# Patient Record
Sex: MALE | Race: BLACK OR AFRICAN AMERICAN | NOT HISPANIC OR LATINO | Employment: OTHER | URBAN - METROPOLITAN AREA
[De-identification: names, ages, dates, MRNs, and addresses within clinical notes are randomized per-mention and may not be internally consistent; named-entity substitution may affect disease eponyms.]

---

## 2017-01-30 ENCOUNTER — OFFICE VISIT (OUTPATIENT)
Dept: OPHTHALMOLOGY | Facility: CLINIC | Age: 82
End: 2017-01-30
Payer: MEDICARE

## 2017-01-30 DIAGNOSIS — H40.013 OPEN ANGLE WITH BORDERLINE FINDINGS, LOW RISK, BILATERAL: Primary | ICD-10-CM

## 2017-01-30 PROCEDURE — 99999 PR PBB SHADOW E&M-EST. PATIENT-LVL II: CPT | Mod: PBBFAC,,, | Performed by: OPHTHALMOLOGY

## 2017-01-30 PROCEDURE — 92133 CPTRZD OPH DX IMG PST SGM ON: CPT | Mod: S$GLB,,, | Performed by: OPHTHALMOLOGY

## 2017-01-30 PROCEDURE — 92083 EXTENDED VISUAL FIELD XM: CPT | Mod: S$GLB,,, | Performed by: OPHTHALMOLOGY

## 2017-01-30 PROCEDURE — 92020 GONIOSCOPY: CPT | Mod: S$GLB,,, | Performed by: OPHTHALMOLOGY

## 2017-01-30 PROCEDURE — 92136 OPHTHALMIC BIOMETRY: CPT | Mod: LT,S$GLB,, | Performed by: OPHTHALMOLOGY

## 2017-01-30 PROCEDURE — 92012 INTRM OPH EXAM EST PATIENT: CPT | Mod: 25,S$GLB,, | Performed by: OPHTHALMOLOGY

## 2017-01-30 RX ORDER — KETOROLAC TROMETHAMINE 5 MG/ML
1 SOLUTION OPHTHALMIC 4 TIMES DAILY
Qty: 1 BOTTLE | Refills: 2 | Status: SHIPPED | OUTPATIENT
Start: 2017-01-30 | End: 2017-04-03 | Stop reason: ALTCHOICE

## 2017-01-30 RX ORDER — PREDNISOLONE ACETATE 10 MG/ML
1 SUSPENSION/ DROPS OPHTHALMIC 4 TIMES DAILY
Qty: 1 BOTTLE | Refills: 2 | Status: SHIPPED | OUTPATIENT
Start: 2017-01-30 | End: 2017-04-03 | Stop reason: ALTCHOICE

## 2017-01-30 RX ORDER — GATIFLOXACIN 5 MG/ML
1 SOLUTION/ DROPS OPHTHALMIC 2 TIMES DAILY
Qty: 1 BOTTLE | Refills: 2 | Status: SHIPPED | OUTPATIENT
Start: 2017-01-30 | End: 2017-04-03 | Stop reason: ALTCHOICE

## 2017-01-30 NOTE — PROGRESS NOTES
SUBJECTIVE:   Chinmay Mei is a 83 y.o. male   Uncorrected distance visual acuity was 20/70 -1 in the right eye and 20/80 in the left eye.   Chief Complaint   Patient presents with    Glaucoma Suspect     6 mwk iop ck, goct, and hvf.         HPI:  HPI     Glaucoma Suspect    Additional comments: 6 mwk iop ck, goct, and hvf.            Comments   PCP: Dr. Leach    1. NSC OU  2. COAG Suspect (+FHX-son Chinmay is my pt)  3. RE       Last edited by Margareth Nieves MA on 1/30/2017  9:58 AM. (History)        Assessment /Plan :  1. Open angle with borderline findings, low risk, bilateral No evidence of glaucoma at this time but based on risk factors recommend to continue monitoring.     2. Nuclear cataract, bilateral  Visually Significant Cataract OS > OD:   Patient reports decreased vision consistent with the clinical amount of lenticular opacity,  which reaches the level of visual significance and affects activities of daily living such as  read. Risks, benefits, and alternatives to cataract surgery were  discussed.  IOL options were discussed, including Premium IOL'S and the associated  side effects and additional patient cost associated with them as well as patient's visual  goals. The pt expressed a desire to proceed with surgery with the potential for some  reasonable degree of visual improvement and was consented.  Risks of loss of vision and eye reviewed as well as possibility of need for spectacle correction after surgery even with premium implants. Verbal and written preop  instructions were provided to the pt.            Pt is interested in traditional monofocal IOL aiming for:       Distance OU and understands that  glasses will be generally needed at all times for near vision and often for finer distance correction especially for higher degrees of astigmatism.             Final visual acuity may be limited by astigmatism    Pt wishes to have OS eye done.    Requests a Monofocal IOL.    Will aim for  distance    Other considerations: Glaucoma Suspect

## 2017-02-23 ENCOUNTER — OFFICE VISIT (OUTPATIENT)
Dept: OPHTHALMOLOGY | Facility: CLINIC | Age: 82
End: 2017-02-23
Payer: MEDICARE

## 2017-02-23 DIAGNOSIS — Z98.890 POST-OPERATIVE STATE: Primary | ICD-10-CM

## 2017-02-23 PROCEDURE — 99024 POSTOP FOLLOW-UP VISIT: CPT | Mod: S$GLB,,, | Performed by: OPHTHALMOLOGY

## 2017-02-23 PROCEDURE — 99999 PR PBB SHADOW E&M-EST. PATIENT-LVL II: CPT | Mod: PBBFAC,,, | Performed by: OPHTHALMOLOGY

## 2017-02-23 NOTE — PROGRESS NOTES
SUBJECTIVE:   Chinmay Mei is a 83 y.o. male   Uncorrected distance visual acuity was not recorded in the right eye and 20/50 in the left eye.   Chief Complaint   Patient presents with    Post-op Evaluation     1 day PO PCIOL OS        HPI:  HPI     Post-op Evaluation    Additional comments: 1 day PO PCIOL OS           Comments   1 day PO PCIOL OS  No pain or discomfort  VA improving OS    PCP: Dr. Lecah    1. NSC OD  PCIOL OS +21.0 SN60WF 2-22-17 (Distance)  2. COAG Suspect (+FHX-son Chinmay is my pt)  3. RE    OS Gatifloxacin, Pred, Ketorolac QID       Last edited by Leena Garcia on 2/23/2017  8:12 AM. (History)        Assessment /Plan :  1. Post-operative state Exam:  SLE:  S/C: normal  K : trace k fold  AC: trace cell and flare  Iris: normal  Lens: PCIOL    IMP:  PO Day 1 S/P Phaco/IOL OS : Doing well.    Plan:  Continue gtts to operative eye:  Pred 1% QID  Ketorolac QID  Zymaxid BID  Reinstructed in importance of absolute compliance with Post-OP instructions including medications, shield at bedtime, and limitation of activities. Follow up appointments in approximately one and six weeks or call immediately for increased pain, redness or vision loss.

## 2017-02-23 NOTE — MR AVS SNAPSHOT
[facility]  [address] [place] [address]  Phone: [phone]  Fax: [phone]                  Chinmay Mei   2017 8:00 AM   Office Visit    Description:  Male : 1933   Provider:  Luis E Horne MD   Department:  [facility]           Reason for Visit     Post-op Evaluation           Diagnoses this Visit        Comments    Post-operative state    -  Primary            To Do List           Goals (5 Years of Data)     None      Follow-Up and Disposition     Return in about 1 week (around 3/2/2017).      [facility]     [facility] -  Assistance  Registered nurses in the [facility] Center provide clinical advisement, health education, appointment booking, and other advisory services.  Call for this free service at [phone].             Medications           Message regarding Medications     Verify the changes and/or additions to your medication regime listed below are the same as discussed with your clinician today.  If any of these changes or additions are incorrect, please notify your healthcare provider.             Verify that the below list of medications is an accurate representation of the medications you are currently taking.  If none reported, the list may be blank. If incorrect, please contact your healthcare provider. Carry this list with you in case of emergency.           Current Medications     acetaminophen (TYLENOL) 325 MG tablet Take 325 mg by mouth as needed.    amlodipine (NORVASC) 5 MG tablet Take 1 tablet (5 mg total) by mouth 2 (two) times daily.    gatifloxacin (ZYMAR) 0.5 % Drop drops Apply 1 drop to eye 2 (two) times daily. Eyedrops to start one day before surgery    hydrALAZINE (APRESOLINE) 50 MG tablet Take 1 tablet (50 mg total) by mouth every 12 (twelve) hours.    ketorolac 0.5% (ACULAR) 0.5 % Drop Place 1 drop into the left eye 4 (four) times daily. Eyedrops to start one day before  surgery    losartan (COZAAR) 100 MG tablet Take 1 tablet (100 mg total) by mouth once daily.    prednisoLONE acetate (PRED FORTE) 1 % DrpS Place 1 drop into the left eye 4 (four) times daily. Start one day before surgery           Clinical Reference Information           Allergies as of 2/23/2017     Aspirin      Immunizations Administered on Date of Encounter - 2/23/2017     None      Language Assistance Services     ATTENTION: Language assistance services are available, free of charge. Please call [phone].      ATENCIÓN: Si latiala broderick, tiene a white disposición servicios gratuitos de asistencia lingüística. Llame al [phone].     JOSE Ý: N?u b?n nói Ti?ng Vi?t, có các d?ch v? h? tr? ngôn ng? mi?n phí dành cho b?n. G?i s? [phone].         [facility] complies with applicable Federal civil rights laws and does not discriminate on the basis of race, color, national origin, age, disability, or sex.

## 2017-03-03 ENCOUNTER — OFFICE VISIT (OUTPATIENT)
Dept: OPHTHALMOLOGY | Facility: CLINIC | Age: 82
End: 2017-03-03
Payer: MEDICARE

## 2017-03-03 DIAGNOSIS — Z98.890 POST-OPERATIVE STATE: Primary | ICD-10-CM

## 2017-03-03 PROCEDURE — 99024 POSTOP FOLLOW-UP VISIT: CPT | Mod: S$GLB,,, | Performed by: OPHTHALMOLOGY

## 2017-03-03 PROCEDURE — 99999 PR PBB SHADOW E&M-EST. PATIENT-LVL II: CPT | Mod: PBBFAC,,, | Performed by: OPHTHALMOLOGY

## 2017-03-03 NOTE — MR AVS SNAPSHOT
[facility]  [address] [place] [address]  Phone: [phone]  Fax: [phone]                  Chinmay Mei   3/3/2017 8:00 AM   Office Visit    Description:  Male : 1933   Provider:  Luis E Horne MD   Department:  [facility]           Reason for Visit     Post-op Evaluation           Diagnoses this Visit        Comments    Post-operative state    -  Primary            To Do List           Goals (5 Years of Data)     None      [facility]     [facility] Care [facility] -  Assistance  Registered nurses in the [facility] Center provide clinical advisement, health education, appointment booking, and other advisory services.  Call for this free service at [phone].             Medications           Message regarding Medications     Verify the changes and/or additions to your medication regime listed below are the same as discussed with your clinician today.  If any of these changes or additions are incorrect, please notify your healthcare provider.             Verify that the below list of medications is an accurate representation of the medications you are currently taking.  If none reported, the list may be blank. If incorrect, please contact your healthcare provider. Carry this list with you in case of emergency.           Current Medications     acetaminophen (TYLENOL) 325 MG tablet Take 325 mg by mouth as needed.    amlodipine (NORVASC) 5 MG tablet Take 1 tablet (5 mg total) by mouth 2 (two) times daily.    gatifloxacin (ZYMAR) 0.5 % Drop drops Apply 1 drop to eye 2 (two) times daily. Eyedrops to start one day before surgery    hydrALAZINE (APRESOLINE) 50 MG tablet Take 1 tablet (50 mg total) by mouth every 12 (twelve) hours.    ketorolac 0.5% (ACULAR) 0.5 % Drop Place 1 drop into the left eye 4 (four) times daily. Eyedrops to start one day before surgery    losartan (COZAAR) 100 MG tablet Take 1 tablet (100 mg total) by mouth  once daily.    prednisoLONE acetate (PRED FORTE) 1 % DrpS Place 1 drop into the left eye 4 (four) times daily. Start one day before surgery           Clinical Reference Information           Allergies as of 3/3/2017     Aspirin      Immunizations Administered on Date of Encounter - 3/3/2017     None      Language Assistance Services     ATTENTION: Language assistance services are available, free of charge. Please call [phone].      ATENCIÓN: Si habla español, tiene a white disposición servicios gratuitos de asistencia lingüística. Llame al [phone].     [facility] Ý: N?u b?n nói Ti?ng Vi?t, có các d?ch v? h? tr? ngôn ng? mi?n phí dành cho b?n. G?i s? [phone].         [facility] complies with applicable Federal civil rights laws and does not discriminate on the basis of race, color, national origin, age, disability, or sex.

## 2017-03-03 NOTE — PROGRESS NOTES
SUBJECTIVE:   Chinmay Mei is a 83 y.o. male   Uncorrected distance visual acuity was not recorded in the right eye and 20/50 -1 in the left eye.   Chief Complaint   Patient presents with    Post-op Evaluation     gat bid os, pred qid os, ket qid os. no pain or irritation        HPI:  HPI     Post-op Evaluation    Additional comments: gat bid os, pred qid os, ket qid os. no pain or   irritation           Comments   1 wk s/p phaco os. States eye is doing well. Does not want to schedule   second eye.   1. NSC OD  PCIOL OS +21.0 SN60WF 2-22-17 (Distance)  2. COAG Suspect (+FHX-son Chinmay is my pt)  3. RE    OS Gatifloxacin, Pred, Ketorolac QID       Last edited by Margareth Nieves MA on 3/3/2017  8:04 AM. (History)        Assessment /Plan :  1. Post-operative state   Slit lamp exam:  L/L: nl  K: clear, wound sealed  AC: trace cell and flare  Iris/Lens: IOL centered and stable      IMP:  PO Week 1 S/P Phaco/ IOL OS : Doing well with no evidence of infection or abnormal inflammation.     Plan:  Pt given and instructed in one week PO instructions. D/C zymaxid and start to taper off Ketorlac and Pred Forte 1% weekly. Can resume normal activitites and d/c eye shield. OTC reading glasses can be used until evaluated for final MRAnnie Follow up in one month or PRN pain, redness, vision loss, or other concerns.

## 2017-04-03 ENCOUNTER — OFFICE VISIT (OUTPATIENT)
Dept: OPHTHALMOLOGY | Facility: CLINIC | Age: 82
End: 2017-04-03
Payer: MEDICARE

## 2017-04-03 DIAGNOSIS — Z98.890 POST-OPERATIVE STATE: Primary | ICD-10-CM

## 2017-04-03 PROCEDURE — 99024 POSTOP FOLLOW-UP VISIT: CPT | Mod: S$GLB,,, | Performed by: OPHTHALMOLOGY

## 2017-04-03 PROCEDURE — 99999 PR PBB SHADOW E&M-EST. PATIENT-LVL II: CPT | Mod: PBBFAC,,, | Performed by: OPHTHALMOLOGY

## 2017-04-03 NOTE — PROGRESS NOTES
SUBJECTIVE:   Chinmay Mei is a 83 y.o. male   Uncorrected distance visual acuity was not recorded in the right eye and 20/30 -2 in the left eye.   Chief Complaint   Patient presents with    Post-op Evaluation     5 wk s/p pciol ou. no pain or irritation. not ready to schedule second eye        HPI:  HPI     Post-op Evaluation    Additional comments: 5 wk s/p pciol ou. no pain or irritation. not ready   to schedule second eye           Comments   1. NSC OD  PCIOL OS +21.0 SN60WF 2-22-17 (Distance)  2. COAG Suspect (+FHX-son Chinmay is my pt)  3. RE         Last edited by Margareth Nieves MA on 4/3/2017 11:01 AM. (History)        Assessment /Plan :  1. Post-operative state   Exam:    Slit lamp exam:  L/L: nl  S/C: quiet and uninflamed  K: clear, wound sealed  AC: no cell or flare  Iris/Lens: IOL centered and stable      Assessment:    PO Month 1 S/P Phaco/IOL OS : Doing Well and completing healing process. Final visual correction options discussed and appropriate prescriptions and/or OTC reading glass recommendations offered to patient. Pt desires PAL Rx. Pt instructed to follow up with Dr. Honre in 6 months for next eye exam or PRN any pain, redness, vision changes or other concerns.     2. Nuclear cataract of right eye monitor for now

## 2017-04-03 NOTE — MR AVS SNAPSHOT
[facility]  [address] [place] [address]  Phone: [phone]  Fax: [phone]                  Chinmay Mei   4/3/2017 10:45 AM   Office Visit    Description:  Male : 1933   Provider:  Luis E Horne MD   Department:  [facility]           Reason for Visit     Post-op Evaluation           Diagnoses this Visit        Comments    Post-operative state    -  Primary     Nuclear cataract of right eye                To Do List           Goals (5 Years of Data)     None      [facility]     [facility] On Call Nurse Care Line -  Assistance  Unless otherwise directed by your provider, please contact [facility], our nurse care line that is available for  assistance.     Registered nurses in the [facility] provide: appointment scheduling, clinical advisement, health education, and other advisory services.  Call: [phone] (toll free)               Medications           Message regarding Medications     Verify the changes and/or additions to your medication regime listed below are the same as discussed with your clinician today.  If any of these changes or additions are incorrect, please notify your healthcare provider.        STOP taking these medications     gatifloxacin (ZYMAR) 0.5 % Drop drops Apply 1 drop to eye 2 (two) times daily. Eyedrops to start one day before surgery    ketorolac 0.5% (ACULAR) 0.5 % Drop Place 1 drop into the left eye 4 (four) times daily. Eyedrops to start one day before surgery    prednisoLONE acetate (PRED FORTE) 1 % DrpS Place 1 drop into the left eye 4 (four) times daily. Start one day before surgery           Verify that the below list of medications is an accurate representation of the medications you are currently taking.  If none reported, the list may be blank. If incorrect, please contact your healthcare provider. Carry this list with you in case of emergency.           Current Medications      acetaminophen (TYLENOL) 325 MG tablet Take 325 mg by mouth as needed.    amlodipine (NORVASC) 5 MG tablet Take 1 tablet (5 mg total) by mouth 2 (two) times daily.    hydrALAZINE (APRESOLINE) 50 MG tablet Take 1 tablet (50 mg total) by mouth every 12 (twelve) hours.    losartan (COZAAR) 100 MG tablet Take 1 tablet (100 mg total) by mouth once daily.           Clinical Reference Information           Allergies as of 4/3/2017     Aspirin      Immunizations Administered on Date of Encounter - 4/3/2017     None      Language Assistance Services     ATTENTION: Language assistance services are available, free of charge. Please call [phone].      ATENCIÓN: Si latiala broderick, tiene a white disposición servicios gratuitos de asistencia lingüística. Llame al [phone].     CHÚ Ý: N?u b?n nói Ti?ng Vi?t, có các d?ch v? h? tr? ngôn ng? mi?n phí dành cho b?n. G?i s? [phone].         [facility] complies with applicable Federal civil rights laws and does not discriminate on the basis of race, color, national origin, age, disability, or sex.

## 2017-07-24 RX ORDER — LOSARTAN POTASSIUM 100 MG/1
TABLET ORAL
Qty: 90 TABLET | Refills: 3 | Status: SHIPPED | OUTPATIENT
Start: 2017-07-24 | End: 2018-12-18 | Stop reason: SDUPTHER

## 2017-07-24 RX ORDER — HYDRALAZINE HYDROCHLORIDE 50 MG/1
TABLET, FILM COATED ORAL
Qty: 180 TABLET | Refills: 3 | Status: SHIPPED | OUTPATIENT
Start: 2017-07-24 | End: 2018-12-18 | Stop reason: SDUPTHER

## 2017-07-24 RX ORDER — AMLODIPINE BESYLATE 5 MG/1
TABLET ORAL
Qty: 180 TABLET | Refills: 3 | Status: SHIPPED | OUTPATIENT
Start: 2017-07-24 | End: 2018-12-18 | Stop reason: SDUPTHER

## 2017-07-25 ENCOUNTER — PATIENT MESSAGE (OUTPATIENT)
Dept: FAMILY MEDICINE | Facility: CLINIC | Age: 82
End: 2017-07-25

## 2017-10-04 ENCOUNTER — OFFICE VISIT (OUTPATIENT)
Dept: OPHTHALMOLOGY | Facility: CLINIC | Age: 82
End: 2017-10-04
Payer: MEDICARE

## 2017-10-04 DIAGNOSIS — H02.836 DERMATOCHALASIS OF EYELIDS OF BOTH EYES: ICD-10-CM

## 2017-10-04 DIAGNOSIS — H25.11 NUCLEAR SENILE CATARACT OF RIGHT EYE: Primary | ICD-10-CM

## 2017-10-04 DIAGNOSIS — H02.833 DERMATOCHALASIS OF EYELIDS OF BOTH EYES: ICD-10-CM

## 2017-10-04 DIAGNOSIS — H57.819 BROW PTOSIS: ICD-10-CM

## 2017-10-04 DIAGNOSIS — Z96.1 PSEUDOPHAKIA: ICD-10-CM

## 2017-10-04 DIAGNOSIS — H52.7 REFRACTIVE ERROR: ICD-10-CM

## 2017-10-04 DIAGNOSIS — H40.013 OPEN ANGLE WITH BORDERLINE FINDINGS, LOW RISK, BILATERAL: ICD-10-CM

## 2017-10-04 PROCEDURE — 99999 PR PBB SHADOW E&M-EST. PATIENT-LVL III: CPT | Mod: PBBFAC,,, | Performed by: OPHTHALMOLOGY

## 2017-10-04 PROCEDURE — 92014 COMPRE OPH EXAM EST PT 1/>: CPT | Mod: S$GLB,,, | Performed by: OPHTHALMOLOGY

## 2017-10-04 PROCEDURE — 92136 OPHTHALMIC BIOMETRY: CPT | Mod: RT,S$GLB,, | Performed by: OPHTHALMOLOGY

## 2017-10-04 RX ORDER — GATIFLOXACIN 5 MG/ML
1 SOLUTION/ DROPS OPHTHALMIC 2 TIMES DAILY
Qty: 1 BOTTLE | Refills: 2 | Status: SHIPPED | OUTPATIENT
Start: 2017-10-04 | End: 2018-03-07 | Stop reason: ALTCHOICE

## 2017-10-04 RX ORDER — PREDNISOLONE SODIUM PHOSPHATE 10 MG/ML
1 SOLUTION/ DROPS OPHTHALMIC 4 TIMES DAILY
Qty: 10 ML | Refills: 1 | Status: SHIPPED | OUTPATIENT
Start: 2017-10-04 | End: 2018-03-07 | Stop reason: ALTCHOICE

## 2017-10-04 RX ORDER — KETOROLAC TROMETHAMINE 5 MG/ML
1 SOLUTION OPHTHALMIC 4 TIMES DAILY
Qty: 1 BOTTLE | Refills: 2 | Status: SHIPPED | OUTPATIENT
Start: 2017-10-04 | End: 2018-03-07 | Stop reason: ALTCHOICE

## 2017-10-04 NOTE — PROGRESS NOTES
SUBJECTIVE:   Chinmay Mei is a 83 y.o. male   Uncorrected distance visual acuity was not recorded in the right eye and 20/25 in the left eye. Corrected distance visual acuity was 20/40 in the right eye and not recorded in the left eye.   Chief Complaint   Patient presents with    Eye Exam        HPI:  HPI     Pt here for 6 month check after cataract surgery. No pain or discomfort.   Pt states that he can't read up close, even with glasses. He says that no   matter which eye he opens, he can't read close. Pt says he is still not   ready to schedule his second eye.     PCP: Dr. Leach    1. NSC OD  PCIOL OS +21.0 SN60WF 2-22-17 (Distance)  2. COAG Suspect (+FHX-son Chinmay is my pt)  3. RE    Last edited by Louie Jack, Patient Care Assistant on 10/4/2017  3:31   PM. (History)        Assessment /Plan :  1. Nuclear senile cataract of right eye   Patient reports decreased vision in the fellow eye consistent with the clinical amount of lenticular opacity, which reaches the level of visual significance and affects activities of daily living including reading and glare. Risks, benefits, and alternatives to cataract surgery were discussed and pt desired to schedule cataract surgery. Pt was consented and the biometry and lens options were reviewed.     Schedule Cataract Surgery OD        Final visual acuity may be limited by astigmatism     Requests a Monofocal  IOL.     Will aim for distance     Other considerations: NONE         2. Pseudophakia,left eye stable   3. Open angle with borderline findings, low risk, bilateral No evidence of glaucoma at this time but based on risk factors recommend to continue monitoring.     4. Dermatochalasis of eyelids of both eyes see below   5. Brow ptosis Recommend Evaluation with Dr.Kyle Giron pt will call and schedule when ready explained to pt lift will help with Acuity   6. Refractive error advised pt to hold off on new RX until cat sx OD     RTC 1-3 months cat sx OD

## 2018-02-22 ENCOUNTER — PATIENT OUTREACH (OUTPATIENT)
Dept: ADMINISTRATIVE | Facility: HOSPITAL | Age: 83
End: 2018-02-22

## 2018-03-07 ENCOUNTER — HOSPITAL ENCOUNTER (OUTPATIENT)
Dept: RADIOLOGY | Facility: HOSPITAL | Age: 83
Discharge: HOME | End: 2018-03-07
Attending: INTERNAL MEDICINE
Payer: MEDICARE

## 2018-03-07 ENCOUNTER — OFFICE VISIT (OUTPATIENT)
Dept: FAMILY MEDICINE | Facility: CLINIC | Age: 83
End: 2018-03-07
Payer: MEDICARE

## 2018-03-07 VITALS
WEIGHT: 207.69 LBS | HEART RATE: 67 BPM | DIASTOLIC BLOOD PRESSURE: 82 MMHG | OXYGEN SATURATION: 99 % | RESPIRATION RATE: 16 BRPM | SYSTOLIC BLOOD PRESSURE: 122 MMHG | BODY MASS INDEX: 28.13 KG/M2 | TEMPERATURE: 98 F | HEIGHT: 72 IN

## 2018-03-07 DIAGNOSIS — I10 ESSENTIAL HYPERTENSION: Primary | ICD-10-CM

## 2018-03-07 DIAGNOSIS — E78.00 HYPERCHOLESTEREMIA: ICD-10-CM

## 2018-03-07 DIAGNOSIS — R01.1 HEART MURMUR: ICD-10-CM

## 2018-03-07 DIAGNOSIS — R97.20 ELEVATED PSA: ICD-10-CM

## 2018-03-07 DIAGNOSIS — I10 ESSENTIAL HYPERTENSION: ICD-10-CM

## 2018-03-07 PROCEDURE — 71046 X-RAY EXAM CHEST 2 VIEWS: CPT | Mod: 26,,, | Performed by: RADIOLOGY

## 2018-03-07 PROCEDURE — 3074F SYST BP LT 130 MM HG: CPT | Mod: S$GLB,,, | Performed by: INTERNAL MEDICINE

## 2018-03-07 PROCEDURE — 71046 X-RAY EXAM CHEST 2 VIEWS: CPT | Mod: TC,FY,PO

## 2018-03-07 PROCEDURE — 3079F DIAST BP 80-89 MM HG: CPT | Mod: S$GLB,,, | Performed by: INTERNAL MEDICINE

## 2018-03-07 PROCEDURE — 99214 OFFICE O/P EST MOD 30 MIN: CPT | Mod: S$GLB,,, | Performed by: INTERNAL MEDICINE

## 2018-03-07 PROCEDURE — 99999 PR PBB SHADOW E&M-EST. PATIENT-LVL IV: CPT | Mod: PBBFAC,,, | Performed by: INTERNAL MEDICINE

## 2018-03-07 NOTE — PROGRESS NOTES
Subjective:       Patient ID: Chinmay Mei is a 84 y.o. male.    Chief Complaint: Annual Exam; Hypertension; Hyperlipidemia; and Elevated PSA    Hypertension   This is a chronic problem. The problem is controlled. Pertinent negatives include no chest pain, headaches, neck pain, palpitations or shortness of breath.   Hyperlipidemia   Pertinent negatives include no chest pain, myalgias or shortness of breath. Current antihyperlipidemic treatment includes diet change and exercise.     Review of Systems   Constitutional: Negative for activity change, appetite change, chills, diaphoresis, fatigue, fever and unexpected weight change.   HENT: Negative for drooling, ear discharge, ear pain, facial swelling, hearing loss, mouth sores, nosebleeds, postnasal drip, rhinorrhea, sinus pressure, sneezing, sore throat, tinnitus, trouble swallowing and voice change.    Eyes: Negative for photophobia, redness and visual disturbance.   Respiratory: Negative for apnea, cough, choking, chest tightness, shortness of breath and wheezing.    Cardiovascular: Negative for chest pain, palpitations and leg swelling.   Gastrointestinal: Negative for abdominal distention, abdominal pain, anal bleeding, blood in stool, constipation, diarrhea, nausea and vomiting.   Endocrine: Negative for cold intolerance, heat intolerance, polydipsia, polyphagia and polyuria.   Genitourinary: Negative for difficulty urinating, dysuria, enuresis, flank pain, frequency, genital sores, hematuria and urgency.   Musculoskeletal: Negative for arthralgias, back pain, gait problem, joint swelling, myalgias, neck pain and neck stiffness.   Skin: Negative for color change, pallor, rash and wound.   Allergic/Immunologic: Negative for food allergies and immunocompromised state.   Neurological: Negative for dizziness, tremors, seizures, syncope, facial asymmetry, speech difficulty, weakness, light-headedness, numbness and headaches.   Hematological: Negative for  adenopathy. Does not bruise/bleed easily.   Psychiatric/Behavioral: Negative for agitation, behavioral problems, confusion, decreased concentration, dysphoric mood, hallucinations, self-injury, sleep disturbance and suicidal ideas. The patient is not nervous/anxious and is not hyperactive.        Objective:      Physical Exam   Constitutional: He is oriented to person, place, and time. He appears well-developed and well-nourished. No distress.   HENT:   Head: Normocephalic and atraumatic.   Mouth/Throat: Oropharyngeal exudate present.   Eyes: Pupils are equal, round, and reactive to light. No scleral icterus.   Neck: Normal range of motion. Neck supple. No JVD present. Carotid bruit is not present. No tracheal deviation present. No thyromegaly present.   Cardiovascular: Normal rate, regular rhythm, normal heart sounds and intact distal pulses.    Pulmonary/Chest: Effort normal and breath sounds normal. No respiratory distress. He has no wheezes. He has no rales. He exhibits no tenderness.   Abdominal: Soft. Bowel sounds are normal. He exhibits no distension. There is no tenderness. There is no rebound and no guarding.   Musculoskeletal: Normal range of motion. He exhibits no edema or tenderness.   Lymphadenopathy:     He has no cervical adenopathy.   Neurological: He is alert and oriented to person, place, and time.   Skin: Skin is warm and dry. No rash noted. He is not diaphoretic. No erythema. No pallor.   Psychiatric: He has a normal mood and affect. His behavior is normal. Judgment and thought content normal.   Nursing note and vitals reviewed.      Assessment:       1. Essential hypertension    2. Hypercholesteremia    3. Heart murmur    4. Elevated PSA        Plan:        stable-----------------------continue meds.               Notes/labs reviewed.        Check cbc,cmp,lipids,tsh,psa. ,cxr.                       F/u 6 months.          Declines vaccines-

## 2018-03-08 ENCOUNTER — TELEPHONE (OUTPATIENT)
Dept: FAMILY MEDICINE | Facility: CLINIC | Age: 83
End: 2018-03-08

## 2018-03-08 DIAGNOSIS — R97.20 PSA ELEVATION: Primary | ICD-10-CM

## 2018-03-08 NOTE — TELEPHONE ENCOUNTER
Spoke with Cally she states his psa level is always elevated and patient does not want to see urology for it.

## 2018-06-15 ENCOUNTER — TELEPHONE (OUTPATIENT)
Dept: FAMILY MEDICINE | Facility: CLINIC | Age: 83
End: 2018-06-15

## 2018-06-15 NOTE — TELEPHONE ENCOUNTER
----- Message from Latasha Hernandez sent at 6/15/2018 10:13 AM CDT -----  Contact: emma Alamo. other   Calling to verify appt for NP wellness appt for 6/19.  Can you tell her more about it?

## 2018-08-03 ENCOUNTER — OFFICE VISIT (OUTPATIENT)
Dept: OPHTHALMOLOGY | Facility: CLINIC | Age: 83
End: 2018-08-03
Payer: MEDICARE

## 2018-08-03 DIAGNOSIS — H25.11 NUCLEAR SENILE CATARACT OF RIGHT EYE: Primary | ICD-10-CM

## 2018-08-03 DIAGNOSIS — Z96.1 PSEUDOPHAKIA: ICD-10-CM

## 2018-08-03 DIAGNOSIS — H40.013 OPEN ANGLE WITH BORDERLINE FINDINGS, LOW RISK, BILATERAL: ICD-10-CM

## 2018-08-03 PROCEDURE — 92136 OPHTHALMIC BIOMETRY: CPT | Mod: RT,S$GLB,, | Performed by: OPHTHALMOLOGY

## 2018-08-03 PROCEDURE — 99999 PR PBB SHADOW E&M-EST. PATIENT-LVL II: CPT | Mod: PBBFAC,,, | Performed by: OPHTHALMOLOGY

## 2018-08-03 PROCEDURE — 92014 COMPRE OPH EXAM EST PT 1/>: CPT | Mod: S$GLB,,, | Performed by: OPHTHALMOLOGY

## 2018-08-03 RX ORDER — PREDNISOLONE ACETATE 10 MG/ML
1 SUSPENSION/ DROPS OPHTHALMIC 4 TIMES DAILY
Qty: 1 BOTTLE | Refills: 2 | Status: SHIPPED | OUTPATIENT
Start: 2018-08-03 | End: 2018-10-01

## 2018-08-03 RX ORDER — GATIFLOXACIN 5 MG/ML
1 SOLUTION/ DROPS OPHTHALMIC 2 TIMES DAILY
Qty: 1 BOTTLE | Refills: 2 | Status: SHIPPED | OUTPATIENT
Start: 2018-08-03 | End: 2018-10-01

## 2018-08-03 RX ORDER — KETOROLAC TROMETHAMINE 5 MG/ML
1 SOLUTION OPHTHALMIC 4 TIMES DAILY
Qty: 1 BOTTLE | Refills: 2 | Status: SHIPPED | OUTPATIENT
Start: 2018-08-03 | End: 2018-10-01

## 2018-08-03 NOTE — PROGRESS NOTES
SUBJECTIVE:   Chinmay Mei is a 84 y.o. male   Corrected distance visual acuity was 20/25 -2 in the right eye and 20/25 in the left eye.   Chief Complaint   Patient presents with    Blurred Vision        HPI:  HPI     Pt states that every now and then, his left eye feels like he has some   sand in it. He says that his right eye is ok. No problems. No pain or   discomfort.     PCP: Dr. Leach    1. NSC OD  PCIOL OS +21.0 SN60WF 2-22-17 (Distance)  2. COAG Suspect (+FHX-son Chinmay is my pt)  3. RE    Last edited by Louie Jack, Patient Care Assistant on 8/3/2018 11:00   AM. (History)        Assessment /Plan :  1. Nuclear senile cataract of right eye    2. Pseudophakia    3. Open angle with borderline findings, low risk, bilateral

## 2018-08-03 NOTE — PROGRESS NOTES
SUBJECTIVE:   Chinmay Mei is a 84 y.o. male   Corrected distance visual acuity was 20/25 -2 in the right eye and 20/25 in the left eye.   Chief Complaint   Patient presents with    Blurred Vision        HPI:  HPI     Pt states that every now and then, his left eye feels like he has some   sand in it. He says that his right eye is ok. No problems. No pain or   discomfort.     PCP: Dr. Leach    1. NSC OD  PCIOL OS +21.0 SN60WF 2-22-17 (Distance)  2. COAG Suspect (+FHX-son Chinmay is my pt)  3. RE    Last edited by Louie Jack, Patient Care Assistant on 8/3/2018 11:00   AM. (History)        Assessment /Plan :  1. Nuclear senile cataract of right eye  Visually Significant Cataract OD:   Patient reports decreased vision consistent with the clinical amount of lenticular opacity,  which reaches the level of visual significance and affects activities of daily living such as  read, drive, watch TV. Risks, benefits, and alternatives to cataract surgery were  discussed.  IOL options were discussed, including Premium IOL'S and the associated  side effects and additional patient cost associated with them as well as patient's visual  goals. The pt expressed a desire to proceed with surgery with the potential for some  reasonable degree of visual improvement and was consented.  Risks of loss of vision and eye reviewed as well as possibility of need for spectacle correction after surgery even with premium implants. Verbal and written preop  instructions were provided to the pt.            Pt is interested in traditional monofocal IOL aiming for:       Distance OU and understands that  glasses will be generally needed at all times for near vision and often for finer distance correction especially for higher degrees of astigmatism.          Near OU and understands glasses will be required for distance vision and possibly for different near or intermediate ranges.       Monovision and understands that depth perception and  higher quality vision will generally be poorer unless corrective glassses are worn.         Pt is interested  In TORIC IOL aiming for:  The Additonal cost of $1350 per eye will be the patient's responsibilty.     Distance OU and understands that  glasses will be generally needed at all times for near vision and possibly for some finer distance correction      Near OU and understands glasses will be required for distance vision and possibly for different near or intermediate ranges.     Monovision and understands that depth perception and higher quality vision will generally be poorer unless corrective glassses are worn.          Pt is interested in Mulitfocal IOL's and understands that approximately 80% patients generally do not require glasses after surgery but some pts still wear glasses for near, intermediate and/or distance tasks especially if they have higher levels of astigmatism. Some pt also see haloes around lights and have more difficulty reading in reduced lighting.The Additonal cost of $1750 per eye will be the patient's responsibilty and second eye will also require a multifocal lens implant.      Final visual acuity may be limited by astigmatism and cornea disease    Pt wishes to have Phaco/IOL  OD     Requests a Monofocal IOL.    Will aim for distance    Other considerations: CORNEAL PRECAUTIONS       2. Pseudophakia  -- Condition stable, no therapeutic change required. Monitoring routinely.     3. Open angle with borderline findings, low risk, bilateral No evidence of glaucoma at this time but based on risk factors recommend to continue monitoring.

## 2018-08-10 ENCOUNTER — TELEPHONE (OUTPATIENT)
Dept: OPHTHALMOLOGY | Facility: CLINIC | Age: 83
End: 2018-08-10

## 2018-08-14 ENCOUNTER — TELEPHONE (OUTPATIENT)
Dept: OPHTHALMOLOGY | Facility: CLINIC | Age: 83
End: 2018-08-14

## 2018-08-14 NOTE — TELEPHONE ENCOUNTER
Spoke to spouse (Summer) and reminded her of the following:surgery is Wednesday 8/15 with arrival time of 0700 @ [facility];post op is 8/16 with arrival time of 0745 @ [facility];start 3 pre-op drops today;use 1 drop of each med before leaving home for surgery;nothing by mouth after midnight except heart, RLS, chronic pain, anxiety, of blood pressure meds before leaving home for surgery

## 2018-08-16 ENCOUNTER — OFFICE VISIT (OUTPATIENT)
Dept: OPHTHALMOLOGY | Facility: CLINIC | Age: 83
End: 2018-08-16
Payer: MEDICARE

## 2018-08-16 DIAGNOSIS — Z98.890 POST-OPERATIVE STATE: Primary | ICD-10-CM

## 2018-08-16 PROCEDURE — 99024 POSTOP FOLLOW-UP VISIT: CPT | Mod: S$GLB,,, | Performed by: OPHTHALMOLOGY

## 2018-08-16 PROCEDURE — 99999 PR PBB SHADOW E&M-EST. PATIENT-LVL II: CPT | Mod: PBBFAC,,, | Performed by: OPHTHALMOLOGY

## 2018-08-16 NOTE — PROGRESS NOTES
SUBJECTIVE:   Chinmay Mei is a 84 y.o. male   Uncorrected distance visual acuity was 20/60 in the right eye and not recorded in the left eye.   Chief Complaint   Patient presents with    Post-op Evaluation     1 day phaco od doing well         HPI:  HPI     Post-op Evaluation      Additional comments: 1 day phaco od doing well           Last edited by Lu Love MA on 8/16/2018  8:04 AM. (History)        Assessment /Plan :  1. Post-operative state Exam:  SLE:  S/C: normal  K : trace k fold  AC: trace cell and flare  Iris: normal  Lens: PCIOL    IMP:  PO Day 1 S/P Phaco/IOL OD : Doing well.    Plan:  Continue gtts to operative eye:  Pred 1% QID  Ketorolac QID  Zymaxid BID  Reinstructed in importance of absolute compliance with Post-OP instructions including medications, shield at bedtime, and limitation of activities. Follow up appointments in approximately one and six weeks or call immediately for increased pain, redness or vision loss.

## 2018-08-23 ENCOUNTER — OFFICE VISIT (OUTPATIENT)
Dept: OPHTHALMOLOGY | Facility: CLINIC | Age: 83
End: 2018-08-23
Payer: MEDICARE

## 2018-08-23 DIAGNOSIS — Z98.890 POST-OPERATIVE STATE: Primary | ICD-10-CM

## 2018-08-23 PROCEDURE — 99999 PR PBB SHADOW E&M-EST. PATIENT-LVL II: CPT | Mod: PBBFAC,,, | Performed by: OPHTHALMOLOGY

## 2018-08-23 PROCEDURE — 99024 POSTOP FOLLOW-UP VISIT: CPT | Mod: S$GLB,,, | Performed by: OPHTHALMOLOGY

## 2018-08-23 NOTE — PROGRESS NOTES
SUBJECTIVE:   Chinmay Mei is a 84 y.o. male   Uncorrected distance visual acuity was 20/80 in the right eye and not recorded in the left eye.   Chief Complaint   Patient presents with    Post-op Evaluation     1 wk PO PCIOL OD        HPI:  HPI     Post-op Evaluation      Additional comments: 1 wk PO PCIOL OD              Comments     1wk PO PCIOL OD  No pain or discomfort  [facility] improving OD    PCP: Dr. Leach    1.PC IOL OD +21.5 SN60WF (distance) 8/16/18  PCIOL OS +21.0 SN60WF 2-22-17 (Distance)  2. COAG Suspect (+FHX-son Chinmay is my pt)  3. RE    Pred Ket qid gat BID OD          Last edited by Leena Garcia on 8/23/2018  1:44 PM. (History)        Assessment /Plan :  1. Post-operative state Slit lamp exam:  L/L: nl  K: clear, wound sealed  AC: trace cell and flare  Iris/Lens: IOL centered and stable      IMP:  PO Week 1 S/P Phaco/ IOL OD : Doing well with no evidence of infection or abnormal inflammation.     Plan:  Pt given and instructed in one week PO instructions. D/C zymaxid and start to taper off Ketorlac and Pred Forte 1% weekly. Can resume normal activitites and d/c eye shield. OTC reading glasses can be used until evaluated for final MR. Follow up in one month or PRN pain, redness, vision loss, or other concerns.

## 2018-09-10 ENCOUNTER — LAB VISIT (OUTPATIENT)
Dept: LAB | Facility: HOSPITAL | Age: 83
End: 2018-09-10
Attending: INTERNAL MEDICINE
Payer: MEDICARE

## 2018-09-10 ENCOUNTER — OFFICE VISIT (OUTPATIENT)
Dept: FAMILY MEDICINE | Facility: CLINIC | Age: 83
End: 2018-09-10
Payer: MEDICARE

## 2018-09-10 VITALS
BODY MASS INDEX: 28.72 KG/M2 | DIASTOLIC BLOOD PRESSURE: 80 MMHG | HEIGHT: 72 IN | HEART RATE: 65 BPM | WEIGHT: 212.06 LBS | OXYGEN SATURATION: 98 % | SYSTOLIC BLOOD PRESSURE: 138 MMHG | TEMPERATURE: 98 F

## 2018-09-10 DIAGNOSIS — I10 ESSENTIAL HYPERTENSION: ICD-10-CM

## 2018-09-10 DIAGNOSIS — R01.1 HEART MURMUR: ICD-10-CM

## 2018-09-10 DIAGNOSIS — R97.20 ELEVATED PSA: ICD-10-CM

## 2018-09-10 DIAGNOSIS — E78.00 HYPERCHOLESTEREMIA: ICD-10-CM

## 2018-09-10 DIAGNOSIS — I35.8 AORTIC VALVE SCLEROSIS: ICD-10-CM

## 2018-09-10 DIAGNOSIS — R97.20 ELEVATED PSA: Primary | ICD-10-CM

## 2018-09-10 PROCEDURE — 99214 OFFICE O/P EST MOD 30 MIN: CPT | Mod: PBBFAC,PO | Performed by: INTERNAL MEDICINE

## 2018-09-10 PROCEDURE — 3075F SYST BP GE 130 - 139MM HG: CPT | Mod: CPTII,,, | Performed by: INTERNAL MEDICINE

## 2018-09-10 PROCEDURE — 3079F DIAST BP 80-89 MM HG: CPT | Mod: CPTII,,, | Performed by: INTERNAL MEDICINE

## 2018-09-10 PROCEDURE — 99999 PR PBB SHADOW E&M-EST. PATIENT-LVL IV: CPT | Mod: PBBFAC,,, | Performed by: INTERNAL MEDICINE

## 2018-09-10 PROCEDURE — 36415 COLL VENOUS BLD VENIPUNCTURE: CPT | Mod: PO

## 2018-09-10 PROCEDURE — 1101F PT FALLS ASSESS-DOCD LE1/YR: CPT | Mod: CPTII,,, | Performed by: INTERNAL MEDICINE

## 2018-09-10 PROCEDURE — 84153 ASSAY OF PSA TOTAL: CPT

## 2018-09-10 PROCEDURE — 99214 OFFICE O/P EST MOD 30 MIN: CPT | Mod: S$PBB,,, | Performed by: INTERNAL MEDICINE

## 2018-09-10 NOTE — PROGRESS NOTES
Subjective:       Patient ID: Chinmay Mei is a 84 y.o. male.    Chief Complaint: Hypertension; Hyperlipidemia; and Elevated PSA    Hypertension   This is a chronic problem. The problem is controlled. Pertinent negatives include no chest pain, headaches, neck pain, palpitations or shortness of breath.   Hyperlipidemia   Pertinent negatives include no chest pain, myalgias or shortness of breath. Current antihyperlipidemic treatment includes diet change and exercise.     Past Medical History:   Diagnosis Date    Arthritis     Hyperlipidemia     Hypertension     Open angle with borderline findings, low risk, bilateral 8/3/2018     Past Surgical History:   Procedure Laterality Date    CATARACT EXTRACTION W/  INTRAOCULAR LENS IMPLANT Left 2017    CATARACT EXTRACTION W/  INTRAOCULAR LENS IMPLANT Right 08/15/2018     Family History   Problem Relation Age of Onset    Heart disease Mother     Hypertension Mother     Prostate cancer Father     Diabetes Father     Diabetes Sister     Heart disease Sister     Glaucoma Sister     Hypertension Sister     Ulcers Sister     Heart disease Brother     Diabetes Brother     Hypertension Brother     Kidney disease Daughter         dialysis    Stomach cancer Daughter     Diabetes Son     Hypertension Son     Stroke Brother      Social History     Socioeconomic History    Marital status:      Spouse name: Not on file    Number of children: Not on file    Years of education: Not on file    Highest education level: Not on file   Social Needs    Financial resource strain: Not on file    Food insecurity - worry: Not on file    Food insecurity - inability: Not on file    Transportation needs - medical: Not on file    Transportation needs - non-medical: Not on file   Occupational History    Not on file   Tobacco Use    Smoking status: Former Smoker     Last attempt to quit: 2006     Years since quittin.1    Smokeless tobacco: Never  Used   Substance and Sexual Activity    Alcohol use: No    Drug use: No    Sexual activity: Not on file   Other Topics Concern    Not on file   Social History Narrative    Not on file     Review of Systems   Constitutional: Negative for activity change, appetite change, chills, diaphoresis, fatigue, fever and unexpected weight change.   HENT: Positive for rhinorrhea. Negative for drooling, ear discharge, ear pain, facial swelling, hearing loss, mouth sores, nosebleeds, postnasal drip, sinus pressure, sneezing, sore throat, tinnitus, trouble swallowing and voice change.    Eyes: Negative for photophobia, discharge, redness and visual disturbance.   Respiratory: Negative for apnea, cough, choking, chest tightness, shortness of breath and wheezing.    Cardiovascular: Negative for chest pain, palpitations and leg swelling.   Gastrointestinal: Positive for constipation. Negative for abdominal distention, abdominal pain, anal bleeding, blood in stool, diarrhea, nausea and vomiting.   Endocrine: Negative for cold intolerance, heat intolerance, polydipsia, polyphagia and polyuria.   Genitourinary: Negative for difficulty urinating, dysuria, enuresis, flank pain, frequency, genital sores, hematuria and urgency.   Musculoskeletal: Positive for arthralgias. Negative for back pain, gait problem, joint swelling, myalgias, neck pain and neck stiffness.   Skin: Negative for color change, pallor, rash and wound.   Allergic/Immunologic: Negative for food allergies and immunocompromised state.   Neurological: Negative for dizziness, tremors, seizures, syncope, facial asymmetry, speech difficulty, weakness, light-headedness, numbness and headaches.   Hematological: Negative for adenopathy. Does not bruise/bleed easily.   Psychiatric/Behavioral: Negative for agitation, behavioral problems, confusion, decreased concentration, dysphoric mood, hallucinations, self-injury, sleep disturbance and suicidal ideas. The patient is not  nervous/anxious and is not hyperactive.        Objective:      Physical Exam   Constitutional: He is oriented to person, place, and time. He appears well-developed and well-nourished. No distress.   HENT:   Head: Normocephalic and atraumatic.   Eyes: Pupils are equal, round, and reactive to light. No scleral icterus.   Neck: Normal range of motion. Neck supple. No JVD present. Carotid bruit is not present. No tracheal deviation present. No thyromegaly present.   Cardiovascular: Normal rate, regular rhythm, normal heart sounds and intact distal pulses.   Pulmonary/Chest: Effort normal and breath sounds normal. No respiratory distress. He has no wheezes. He has no rales. He exhibits no tenderness.   Abdominal: Soft. Bowel sounds are normal. He exhibits no distension. There is no tenderness. There is no rebound and no guarding.   Musculoskeletal: Normal range of motion. He exhibits no edema or tenderness.   Lymphadenopathy:     He has no cervical adenopathy.   Neurological: He is alert and oriented to person, place, and time.   Skin: Skin is warm and dry. No rash noted. He is not diaphoretic. No erythema. No pallor.   Psychiatric: He has a normal mood and affect. His behavior is normal. Judgment and thought content normal.   Nursing note and vitals reviewed.      CMP  Sodium   Date Value Ref Range Status   03/07/2018 139 136 - 145 mmol/L Final     Potassium   Date Value Ref Range Status   03/07/2018 4.3 3.5 - 5.1 mmol/L Final     Chloride   Date Value Ref Range Status   03/07/2018 105 95 - 110 mmol/L Final     CO2   Date Value Ref Range Status   03/07/2018 27 23 - 29 mmol/L Final     Glucose   Date Value Ref Range Status   03/07/2018 102 70 - 110 mg/dL Final     BUN, Bld   Date Value Ref Range Status   03/07/2018 15 8 - 23 mg/dL Final     Creatinine   Date Value Ref Range Status   03/07/2018 1.0 0.5 - 1.4 mg/dL Final     Calcium   Date Value Ref Range Status   03/07/2018 10.0 8.7 - 10.5 mg/dL Final     Total Protein    Date Value Ref Range Status   03/07/2018 8.1 6.0 - 8.4 g/dL Final     Albumin   Date Value Ref Range Status   03/07/2018 4.2 3.5 - 5.2 g/dL Final     Total Bilirubin   Date Value Ref Range Status   03/07/2018 1.1 (H) 0.1 - 1.0 mg/dL Final     Comment:     For infants and newborns, interpretation of results should be based  on gestational age, weight and in agreement with clinical  observations.  Premature Infant recommended reference ranges:  Up to 24 hours.............<8.0 mg/dL  Up to 48 hours............<12.0 mg/dL  3-5 days..................<15.0 mg/dL  6-29 days.................<15.0 mg/dL       Alkaline Phosphatase   Date Value Ref Range Status   03/07/2018 96 55 - 135 U/L Final     AST   Date Value Ref Range Status   03/07/2018 22 10 - 40 U/L Final     ALT   Date Value Ref Range Status   03/07/2018 13 10 - 44 U/L Final     Anion Gap   Date Value Ref Range Status   03/07/2018 7 (L) 8 - 16 mmol/L Final     eGFR if    Date Value Ref Range Status   03/07/2018 >60.0 >60 mL/min/1.73 m^2 Final     eGFR if non    Date Value Ref Range Status   03/07/2018 >60.0 >60 mL/min/1.73 m^2 Final     Comment:     Calculation used to obtain the estimated glomerular filtration  rate (eGFR) is the CKD-EPI equation.        Lab Results   Component Value Date    WBC 6.21 03/07/2018    HGB 13.6 (L) 03/07/2018    HCT 42.9 03/07/2018    MCV 87 03/07/2018     03/07/2018     Lab Results   Component Value Date    CHOL 223 (H) 03/07/2018     Lab Results   Component Value Date    HDL 37 (L) 03/07/2018     Lab Results   Component Value Date    LDLCALC 157.0 03/07/2018     Lab Results   Component Value Date    TRIG 145 03/07/2018     Lab Results   Component Value Date    CHOLHDL 16.6 (L) 03/07/2018     Lab Results   Component Value Date    TSH 1.032 03/07/2018     No results found for: LABA1C, HGBA1C  Assessment:       1. Elevated PSA    2. Heart murmur    3. Essential hypertension    4.  Hypercholesteremia    5. Aortic valve sclerosis        Plan:   Elevated PSA----declines urology appt.  -     Prostate Specific Antigen, Diagnostic; Future; Expected date: 09/10/2018    Heart murmur    Essential hypertension--stable.    Hypercholesteremia-watch diet.,    Aortic valve sclerosis       F/u 6 months.

## 2018-09-11 ENCOUNTER — TELEPHONE (OUTPATIENT)
Dept: FAMILY MEDICINE | Facility: CLINIC | Age: 83
End: 2018-09-11

## 2018-09-11 LAB — COMPLEXED PSA SERPL-MCNC: 8 NG/ML

## 2018-10-01 ENCOUNTER — OFFICE VISIT (OUTPATIENT)
Dept: OPHTHALMOLOGY | Facility: CLINIC | Age: 83
End: 2018-10-01
Payer: MEDICARE

## 2018-10-01 DIAGNOSIS — Z98.890 POST-OPERATIVE STATE: Primary | ICD-10-CM

## 2018-10-01 PROCEDURE — 99211 OFF/OP EST MAY X REQ PHY/QHP: CPT | Mod: PBBFAC,PO | Performed by: OPTOMETRIST

## 2018-10-01 PROCEDURE — 99024 POSTOP FOLLOW-UP VISIT: CPT | Mod: ,,, | Performed by: OPTOMETRIST

## 2018-10-01 PROCEDURE — 99999 PR PBB SHADOW E&M-EST. PATIENT-LVL I: CPT | Mod: PBBFAC,,, | Performed by: OPTOMETRIST

## 2018-10-01 NOTE — PROGRESS NOTES
HPI     Post-op Evaluation      Additional comments: 5 wk s/p phaco OD. states he is doing well.               Comments     1.PC IOL OD +21.5 SN60WF (distance) 8/16/18  PCIOL OS +21.0 SN60WF 2-22-17 (Distance)  2. COAG Suspect (+FHX-son Chinmay is my pt)  3. RE          Last edited by Margareth March MA on 10/1/2018 10:08 AM. (History)            Assessment /Plan     For exam results, see Encounter Report.    Post-operative state      Doing well OU  New spec rx given, ok to c/w OTC readers +1.25 best for near  Eyeglass Final Rx     Eyeglass Final Rx       Sphere Cylinder Axis Add    Right -1.50 +2.75 180 +2.25    Left -1.75 +1.75 162 +2.25    Expiration Date:  10/2/2019                  RTC 6 months for dilated eye exam with CPG, PRN sooner if any problems or concerns

## 2018-12-19 RX ORDER — LOSARTAN POTASSIUM 100 MG/1
TABLET ORAL
Qty: 90 TABLET | Refills: 3 | Status: SHIPPED | OUTPATIENT
Start: 2018-12-19 | End: 2019-09-25

## 2018-12-19 RX ORDER — HYDRALAZINE HYDROCHLORIDE 50 MG/1
TABLET, FILM COATED ORAL
Qty: 180 TABLET | Refills: 3 | Status: SHIPPED | OUTPATIENT
Start: 2018-12-19 | End: 2020-03-24 | Stop reason: SDUPTHER

## 2018-12-19 RX ORDER — AMLODIPINE BESYLATE 5 MG/1
TABLET ORAL
Qty: 180 TABLET | Refills: 3 | Status: SHIPPED | OUTPATIENT
Start: 2018-12-19 | End: 2020-03-24 | Stop reason: SDUPTHER

## 2019-01-07 ENCOUNTER — OFFICE VISIT (OUTPATIENT)
Dept: OPHTHALMOLOGY | Facility: CLINIC | Age: 84
End: 2019-01-07
Payer: MEDICARE

## 2019-01-07 DIAGNOSIS — H10.13 ALLERGIC CONJUNCTIVITIS OF BOTH EYES: Primary | ICD-10-CM

## 2019-01-07 PROCEDURE — 92012 INTRM OPH EXAM EST PATIENT: CPT | Mod: S$GLB,,, | Performed by: OPHTHALMOLOGY

## 2019-01-07 PROCEDURE — 99999 PR PBB SHADOW E&M-EST. PATIENT-LVL II: ICD-10-PCS | Mod: PBBFAC,,, | Performed by: OPHTHALMOLOGY

## 2019-01-07 PROCEDURE — 92012 PR EYE EXAM, EST PATIENT,INTERMED: ICD-10-PCS | Mod: S$GLB,,, | Performed by: OPHTHALMOLOGY

## 2019-01-07 PROCEDURE — 99999 PR PBB SHADOW E&M-EST. PATIENT-LVL II: CPT | Mod: PBBFAC,,, | Performed by: OPHTHALMOLOGY

## 2019-01-07 NOTE — PROGRESS NOTES
SUBJECTIVE:   Chinmay Mei is a 85 y.o. male   Uncorrected distance visual acuity was 20/100 in the right eye and 20/60 in the left eye.   Chief Complaint   Patient presents with    Eye Problem     pt c/o scratchiness, itching, and matting in OS. started last few weeks        HPI:  HPI     Eye Problem      Additional comments: pt c/o scratchiness, itching, and matting in OS.   started last few weeks              Comments     pt c/o scratchiness, itching, and matting in OS. started last few weeks.   Today was the worst.     1.PC IOL OD +21.5 SN60WF (distance) 8/16/18  PCIOL OS +21.0 SN60WF 2-22-17 (Distance)  2. COAG Suspect (+FHX-son Chinmay is my pt)  3. RE          Last edited by Margareth March MA on 1/7/2019  3:19 PM. (History)        Assessment /Plan :  1. Allergic conjunctivitis of both eyes Begin Zaditor BID OU..Begin Cold Compresses when the eye's become itchy along with drops            RTC as scheduled

## 2019-03-25 ENCOUNTER — LAB VISIT (OUTPATIENT)
Dept: LAB | Facility: HOSPITAL | Age: 84
End: 2019-03-25
Payer: MEDICARE

## 2019-03-25 ENCOUNTER — OFFICE VISIT (OUTPATIENT)
Dept: FAMILY MEDICINE | Facility: CLINIC | Age: 84
End: 2019-03-25
Payer: MEDICARE

## 2019-03-25 VITALS
WEIGHT: 214.94 LBS | HEIGHT: 72 IN | TEMPERATURE: 99 F | BODY MASS INDEX: 29.11 KG/M2 | DIASTOLIC BLOOD PRESSURE: 89 MMHG | SYSTOLIC BLOOD PRESSURE: 136 MMHG | RESPIRATION RATE: 18 BRPM | HEART RATE: 89 BPM | OXYGEN SATURATION: 96 %

## 2019-03-25 DIAGNOSIS — E78.00 HYPERCHOLESTEREMIA: ICD-10-CM

## 2019-03-25 DIAGNOSIS — R97.20 ELEVATED PSA: ICD-10-CM

## 2019-03-25 DIAGNOSIS — R41.3 MEMORY LOSS: ICD-10-CM

## 2019-03-25 DIAGNOSIS — I10 ESSENTIAL HYPERTENSION: Primary | ICD-10-CM

## 2019-03-25 DIAGNOSIS — I10 ESSENTIAL HYPERTENSION: ICD-10-CM

## 2019-03-25 DIAGNOSIS — I35.8 AORTIC VALVE SCLEROSIS: ICD-10-CM

## 2019-03-25 PROCEDURE — 1101F PR PT FALLS ASSESS DOC 0-1 FALLS W/OUT INJ PAST YR: ICD-10-PCS | Mod: CPTII,S$GLB,, | Performed by: INTERNAL MEDICINE

## 2019-03-25 PROCEDURE — 3079F PR MOST RECENT DIASTOLIC BLOOD PRESSURE 80-89 MM HG: ICD-10-PCS | Mod: CPTII,S$GLB,, | Performed by: INTERNAL MEDICINE

## 2019-03-25 PROCEDURE — 84153 ASSAY OF PSA TOTAL: CPT

## 2019-03-25 PROCEDURE — 36415 COLL VENOUS BLD VENIPUNCTURE: CPT | Mod: PO

## 2019-03-25 PROCEDURE — 80061 LIPID PANEL: CPT

## 2019-03-25 PROCEDURE — 99214 OFFICE O/P EST MOD 30 MIN: CPT | Mod: S$GLB,,, | Performed by: INTERNAL MEDICINE

## 2019-03-25 PROCEDURE — 99999 PR PBB SHADOW E&M-EST. PATIENT-LVL IV: CPT | Mod: PBBFAC,,, | Performed by: INTERNAL MEDICINE

## 2019-03-25 PROCEDURE — 3079F DIAST BP 80-89 MM HG: CPT | Mod: CPTII,S$GLB,, | Performed by: INTERNAL MEDICINE

## 2019-03-25 PROCEDURE — 3075F SYST BP GE 130 - 139MM HG: CPT | Mod: CPTII,S$GLB,, | Performed by: INTERNAL MEDICINE

## 2019-03-25 PROCEDURE — 99214 PR OFFICE/OUTPT VISIT, EST, LEVL IV, 30-39 MIN: ICD-10-PCS | Mod: S$GLB,,, | Performed by: INTERNAL MEDICINE

## 2019-03-25 PROCEDURE — 1101F PT FALLS ASSESS-DOCD LE1/YR: CPT | Mod: CPTII,S$GLB,, | Performed by: INTERNAL MEDICINE

## 2019-03-25 PROCEDURE — 82607 VITAMIN B-12: CPT

## 2019-03-25 PROCEDURE — 99999 PR PBB SHADOW E&M-EST. PATIENT-LVL IV: ICD-10-PCS | Mod: PBBFAC,,, | Performed by: INTERNAL MEDICINE

## 2019-03-25 PROCEDURE — 3075F PR MOST RECENT SYSTOLIC BLOOD PRESS GE 130-139MM HG: ICD-10-PCS | Mod: CPTII,S$GLB,, | Performed by: INTERNAL MEDICINE

## 2019-03-25 PROCEDURE — 80053 COMPREHEN METABOLIC PANEL: CPT

## 2019-03-25 PROCEDURE — 85025 COMPLETE CBC W/AUTO DIFF WBC: CPT

## 2019-03-25 NOTE — PROGRESS NOTES
Subjective:       Patient ID: Chinmay Mei is a 85 y.o. male.    Chief Complaint: Follow-up; Hypertension; Hyperlipidemia; and Elevated PSA    Hypertension   Pertinent negatives include no chest pain, headaches, neck pain, palpitations or shortness of breath.   Hyperlipidemia   Pertinent negatives include no chest pain, myalgias or shortness of breath.     Past Medical History:   Diagnosis Date    Arthritis     Hyperlipidemia     Hypertension     Open angle with borderline findings, low risk, bilateral 8/3/2018     Past Surgical History:   Procedure Laterality Date    CATARACT EXTRACTION W/  INTRAOCULAR LENS IMPLANT Left 2017    CATARACT EXTRACTION W/  INTRAOCULAR LENS IMPLANT Right 08/15/2018     Family History   Problem Relation Age of Onset    Heart disease Mother     Hypertension Mother     Prostate cancer Father     Diabetes Father     Diabetes Sister     Heart disease Sister     Glaucoma Sister     Hypertension Sister     Ulcers Sister     Heart disease Brother     Diabetes Brother     Hypertension Brother     Kidney disease Daughter         dialysis    Stomach cancer Daughter     Diabetes Son     Hypertension Son     Stroke Brother      Social History     Socioeconomic History    Marital status:      Spouse name: Not on file    Number of children: Not on file    Years of education: Not on file    Highest education level: Not on file   Occupational History    Not on file   Social Needs    Financial resource strain: Not on file    Food insecurity:     Worry: Not on file     Inability: Not on file    Transportation needs:     Medical: Not on file     Non-medical: Not on file   Tobacco Use    Smoking status: Former Smoker     Last attempt to quit: 2006     Years since quittin.6    Smokeless tobacco: Never Used   Substance and Sexual Activity    Alcohol use: No    Drug use: No    Sexual activity: Not on file   Lifestyle    Physical activity:     Days  per week: Not on file     Minutes per session: Not on file    Stress: Not on file   Relationships    Social connections:     Talks on phone: Not on file     Gets together: Not on file     Attends Gnosticism service: Not on file     Active member of club or organization: Not on file     Attends meetings of clubs or organizations: Not on file     Relationship status: Not on file    Intimate partner violence:     Fear of current or ex partner: Not on file     Emotionally abused: Not on file     Physically abused: Not on file     Forced sexual activity: Not on file   Other Topics Concern    Not on file   Social History Narrative    Not on file     Review of Systems   Constitutional: Negative for activity change, appetite change, chills, diaphoresis, fatigue, fever and unexpected weight change.   HENT: Negative for drooling, ear discharge, ear pain, facial swelling, hearing loss, mouth sores, nosebleeds, postnasal drip, rhinorrhea, sinus pressure, sneezing, sore throat, tinnitus and voice change.    Eyes: Negative for photophobia, discharge, redness and visual disturbance.   Respiratory: Negative for apnea, cough, choking, chest tightness, shortness of breath and wheezing.    Cardiovascular: Negative for chest pain, palpitations and leg swelling.   Gastrointestinal: Negative for abdominal distention, abdominal pain, anal bleeding, blood in stool, constipation, diarrhea, nausea and vomiting.   Endocrine: Negative for cold intolerance, heat intolerance, polydipsia, polyphagia and polyuria.   Genitourinary: Negative for difficulty urinating, dysuria, enuresis, flank pain, frequency, genital sores, hematuria and urgency.   Musculoskeletal: Negative for arthralgias, back pain, gait problem, joint swelling, myalgias, neck pain and neck stiffness.   Skin: Negative for color change, pallor, rash and wound.   Allergic/Immunologic: Negative for food allergies and immunocompromised state.   Neurological: Negative for dizziness,  tremors, seizures, syncope, facial asymmetry, speech difficulty, weakness, light-headedness, numbness and headaches.        Memory loss   Hematological: Negative for adenopathy. Does not bruise/bleed easily.   Psychiatric/Behavioral: Negative for agitation, behavioral problems, confusion, decreased concentration, dysphoric mood, hallucinations, self-injury, sleep disturbance and suicidal ideas. The patient is not nervous/anxious and is not hyperactive.        Objective:      Physical Exam   Constitutional: He is oriented to person, place, and time. He appears well-developed and well-nourished. No distress.   HENT:   Head: Normocephalic and atraumatic.   Mouth/Throat: No oropharyngeal exudate.   Eyes: Pupils are equal, round, and reactive to light.   Neck: Normal range of motion. Neck supple. No JVD present. Carotid bruit is not present. No tracheal deviation present. No thyromegaly present.   Cardiovascular: Normal rate, regular rhythm, normal heart sounds and intact distal pulses.   Pulmonary/Chest: Effort normal and breath sounds normal. No respiratory distress. He has no wheezes. He has no rales. He exhibits no tenderness.   Abdominal: Soft. Bowel sounds are normal. He exhibits no distension. There is no tenderness. There is no rebound and no guarding.   Musculoskeletal: Normal range of motion. He exhibits no edema or tenderness.   Lymphadenopathy:     He has no cervical adenopathy.   Neurological: He is alert and oriented to person, place, and time. No cranial nerve deficit.   Skin: Skin is warm and dry. No rash noted. He is not diaphoretic. No erythema. No pallor.   Psychiatric: He has a normal mood and affect. His behavior is normal. Judgment and thought content normal.   Nursing note and vitals reviewed.      CMP  Sodium   Date Value Ref Range Status   03/07/2018 139 136 - 145 mmol/L Final     Potassium   Date Value Ref Range Status   03/07/2018 4.3 3.5 - 5.1 mmol/L Final     Chloride   Date Value Ref Range  Status   03/07/2018 105 95 - 110 mmol/L Final     CO2   Date Value Ref Range Status   03/07/2018 27 23 - 29 mmol/L Final     Glucose   Date Value Ref Range Status   03/07/2018 102 70 - 110 mg/dL Final     BUN, Bld   Date Value Ref Range Status   03/07/2018 15 8 - 23 mg/dL Final     Creatinine   Date Value Ref Range Status   03/07/2018 1.0 0.5 - 1.4 mg/dL Final     Calcium   Date Value Ref Range Status   03/07/2018 10.0 8.7 - 10.5 mg/dL Final     Total Protein   Date Value Ref Range Status   03/07/2018 8.1 6.0 - 8.4 g/dL Final     Albumin   Date Value Ref Range Status   03/07/2018 4.2 3.5 - 5.2 g/dL Final     Total Bilirubin   Date Value Ref Range Status   03/07/2018 1.1 (H) 0.1 - 1.0 mg/dL Final     Comment:     For infants and newborns, interpretation of results should be based  on gestational age, weight and in agreement with clinical  observations.  Premature Infant recommended reference ranges:  Up to 24 hours.............<8.0 mg/dL  Up to 48 hours............<12.0 mg/dL  3-5 days..................<15.0 mg/dL  6-29 days.................<15.0 mg/dL       Alkaline Phosphatase   Date Value Ref Range Status   03/07/2018 96 55 - 135 U/L Final     AST   Date Value Ref Range Status   03/07/2018 22 10 - 40 U/L Final     ALT   Date Value Ref Range Status   03/07/2018 13 10 - 44 U/L Final     Anion Gap   Date Value Ref Range Status   03/07/2018 7 (L) 8 - 16 mmol/L Final     eGFR if    Date Value Ref Range Status   03/07/2018 >60.0 >60 mL/min/1.73 m^2 Final     eGFR if non    Date Value Ref Range Status   03/07/2018 >60.0 >60 mL/min/1.73 m^2 Final     Comment:     Calculation used to obtain the estimated glomerular filtration  rate (eGFR) is the CKD-EPI equation.        Lab Results   Component Value Date    WBC 6.21 03/07/2018    HGB 13.6 (L) 03/07/2018    HCT 42.9 03/07/2018    MCV 87 03/07/2018     03/07/2018     Lab Results   Component Value Date    CHOL 223 (H) 03/07/2018     Lab  Results   Component Value Date    HDL 37 (L) 03/07/2018     Lab Results   Component Value Date    LDLCALC 157.0 03/07/2018     Lab Results   Component Value Date    TRIG 145 03/07/2018     Lab Results   Component Value Date    CHOLHDL 16.6 (L) 03/07/2018     Lab Results   Component Value Date    TSH 1.032 03/07/2018     No results found for: LABA1C, HGBA1C  Assessment:       1. Essential hypertension    2. Hypercholesteremia    3. Elevated PSA    4. Aortic valve sclerosis    5. Memory loss        Plan:   Essential hypertension-stable.  -     Comprehensive metabolic panel; Future; Expected date: 03/25/2019  -     CBC auto differential; Future; Expected date: 03/25/2019    Hypercholesteremia---------  -     Lipid panel; Future; Expected date: 03/25/2019    Elevated PSA  -     Prostate Specific Antigen, Diagnostic; Future; Expected date: 03/25/2019    Aortic valve sclerosis    Memory loss  -     Vitamin B12; Future; Expected date: 03/25/2019  -     Ambulatory referral to Neurology    F/u 6 months.

## 2019-03-26 ENCOUNTER — TELEPHONE (OUTPATIENT)
Dept: FAMILY MEDICINE | Facility: CLINIC | Age: 84
End: 2019-03-26

## 2019-03-26 DIAGNOSIS — R97.20 PSA ELEVATION: Primary | ICD-10-CM

## 2019-03-26 LAB
ALBUMIN SERPL BCP-MCNC: 3.9 G/DL (ref 3.5–5.2)
ALP SERPL-CCNC: 88 U/L (ref 55–135)
ALT SERPL W/O P-5'-P-CCNC: 14 U/L (ref 10–44)
ANION GAP SERPL CALC-SCNC: 11 MMOL/L (ref 8–16)
AST SERPL-CCNC: 18 U/L (ref 10–40)
BASOPHILS # BLD AUTO: 0.04 K/UL (ref 0–0.2)
BASOPHILS NFR BLD: 0.6 % (ref 0–1.9)
BILIRUB SERPL-MCNC: 0.7 MG/DL (ref 0.1–1)
BUN SERPL-MCNC: 22 MG/DL (ref 8–23)
CALCIUM SERPL-MCNC: 9.7 MG/DL (ref 8.7–10.5)
CHLORIDE SERPL-SCNC: 103 MMOL/L (ref 95–110)
CHOLEST SERPL-MCNC: 198 MG/DL (ref 120–199)
CHOLEST/HDLC SERPL: 6.2 {RATIO} (ref 2–5)
CO2 SERPL-SCNC: 26 MMOL/L (ref 23–29)
COMPLEXED PSA SERPL-MCNC: 9.9 NG/ML (ref 0–4)
CREAT SERPL-MCNC: 1 MG/DL (ref 0.5–1.4)
DIFFERENTIAL METHOD: ABNORMAL
EOSINOPHIL # BLD AUTO: 0.1 K/UL (ref 0–0.5)
EOSINOPHIL NFR BLD: 2 % (ref 0–8)
ERYTHROCYTE [DISTWIDTH] IN BLOOD BY AUTOMATED COUNT: 15.6 % (ref 11.5–14.5)
EST. GFR  (AFRICAN AMERICAN): >60 ML/MIN/1.73 M^2
EST. GFR  (NON AFRICAN AMERICAN): >60 ML/MIN/1.73 M^2
GLUCOSE SERPL-MCNC: 80 MG/DL (ref 70–110)
HCT VFR BLD AUTO: 42.4 % (ref 40–54)
HDLC SERPL-MCNC: 32 MG/DL (ref 40–75)
HDLC SERPL: 16.2 % (ref 20–50)
HGB BLD-MCNC: 13.2 G/DL (ref 14–18)
IMM GRANULOCYTES # BLD AUTO: 0.01 K/UL (ref 0–0.04)
IMM GRANULOCYTES NFR BLD AUTO: 0.2 % (ref 0–0.5)
LDLC SERPL CALC-MCNC: 128 MG/DL (ref 63–159)
LYMPHOCYTES # BLD AUTO: 2.9 K/UL (ref 1–4.8)
LYMPHOCYTES NFR BLD: 44.4 % (ref 18–48)
MCH RBC QN AUTO: 27 PG (ref 27–31)
MCHC RBC AUTO-ENTMCNC: 31.1 G/DL (ref 32–36)
MCV RBC AUTO: 87 FL (ref 82–98)
MONOCYTES # BLD AUTO: 0.6 K/UL (ref 0.3–1)
MONOCYTES NFR BLD: 9 % (ref 4–15)
NEUTROPHILS # BLD AUTO: 2.9 K/UL (ref 1.8–7.7)
NEUTROPHILS NFR BLD: 43.8 % (ref 38–73)
NONHDLC SERPL-MCNC: 166 MG/DL
NRBC BLD-RTO: 0 /100 WBC
PLATELET # BLD AUTO: 302 K/UL (ref 150–350)
PMV BLD AUTO: 11.1 FL (ref 9.2–12.9)
POTASSIUM SERPL-SCNC: 4.1 MMOL/L (ref 3.5–5.1)
PROT SERPL-MCNC: 7.4 G/DL (ref 6–8.4)
RBC # BLD AUTO: 4.88 M/UL (ref 4.6–6.2)
SODIUM SERPL-SCNC: 140 MMOL/L (ref 136–145)
TRIGL SERPL-MCNC: 190 MG/DL (ref 30–150)
VIT B12 SERPL-MCNC: 351 PG/ML (ref 210–950)
WBC # BLD AUTO: 6.56 K/UL (ref 3.9–12.7)

## 2019-04-08 ENCOUNTER — OFFICE VISIT (OUTPATIENT)
Dept: OPHTHALMOLOGY | Facility: CLINIC | Age: 84
End: 2019-04-08
Payer: MEDICARE

## 2019-04-08 DIAGNOSIS — H40.013 OPEN ANGLE WITH BORDERLINE FINDINGS, LOW RISK, BILATERAL: ICD-10-CM

## 2019-04-08 DIAGNOSIS — Z96.1 PSEUDOPHAKIA: Primary | ICD-10-CM

## 2019-04-08 PROCEDURE — 92014 COMPRE OPH EXAM EST PT 1/>: CPT | Mod: HCNC,S$GLB,, | Performed by: OPHTHALMOLOGY

## 2019-04-08 PROCEDURE — 99999 PR PBB SHADOW E&M-EST. PATIENT-LVL II: CPT | Mod: PBBFAC,HCNC,, | Performed by: OPHTHALMOLOGY

## 2019-04-08 PROCEDURE — 92014 PR EYE EXAM, EST PATIENT,COMPREHESV: ICD-10-PCS | Mod: HCNC,S$GLB,, | Performed by: OPHTHALMOLOGY

## 2019-04-08 PROCEDURE — 99999 PR PBB SHADOW E&M-EST. PATIENT-LVL II: ICD-10-PCS | Mod: PBBFAC,HCNC,, | Performed by: OPHTHALMOLOGY

## 2019-04-08 NOTE — PROGRESS NOTES
SUBJECTIVE:   Chinmay Mei is a 85 y.o. male   Corrected distance visual acuity was 20/20 in the right eye and 20/25 in the left eye.   Chief Complaint   Patient presents with    Follow-up     6m pseudo dilated exam.        HPI:  HPI     Follow-up      Additional comments: 6m pseudo dilated exam.              Comments     Pt here for 6m dilated exam. No pain or discomfort. VA stable. No gtts.     PCP: Dr. Leach    1.PC IOL OD +21.5 SN60WF (distance) 8/16/18  PCIOL OS +21.0 SN60WF 2-22-17 (Distance)  2. COAG Suspect (+FHX-son Chinmay is my pt)  3. RE          Last edited by Louie Jack, Patient Care Assistant on 4/8/2019 11:32   AM. (History)        Assessment /Plan :  1. Pseudophakia  -- Condition stable, no therapeutic change required. Monitoring routinely.     2. Open angle with borderline findings, low risk, bilateral No evidence of glaucoma at this time but based on risk factors recommend to continue monitoring.       Return to clinic in 6 months  or as needed.  With IOP Check and GOCT

## 2019-09-25 ENCOUNTER — LAB VISIT (OUTPATIENT)
Dept: LAB | Facility: HOSPITAL | Age: 84
End: 2019-09-25
Payer: MEDICARE

## 2019-09-25 ENCOUNTER — OFFICE VISIT (OUTPATIENT)
Dept: FAMILY MEDICINE | Facility: CLINIC | Age: 84
End: 2019-09-25
Payer: MEDICARE

## 2019-09-25 VITALS
DIASTOLIC BLOOD PRESSURE: 86 MMHG | HEART RATE: 75 BPM | WEIGHT: 208.75 LBS | BODY MASS INDEX: 28.32 KG/M2 | OXYGEN SATURATION: 97 % | SYSTOLIC BLOOD PRESSURE: 138 MMHG | TEMPERATURE: 98 F

## 2019-09-25 DIAGNOSIS — I35.8 AORTIC VALVE SCLEROSIS: ICD-10-CM

## 2019-09-25 DIAGNOSIS — I10 ESSENTIAL HYPERTENSION: ICD-10-CM

## 2019-09-25 DIAGNOSIS — I10 ESSENTIAL HYPERTENSION: Primary | ICD-10-CM

## 2019-09-25 DIAGNOSIS — E78.00 HYPERCHOLESTEREMIA: ICD-10-CM

## 2019-09-25 LAB
ALBUMIN SERPL BCP-MCNC: 4.4 G/DL (ref 3.5–5.2)
ALP SERPL-CCNC: 81 U/L (ref 55–135)
ALT SERPL W/O P-5'-P-CCNC: 11 U/L (ref 10–44)
ANION GAP SERPL CALC-SCNC: 9 MMOL/L (ref 8–16)
AST SERPL-CCNC: 14 U/L (ref 10–40)
BASOPHILS # BLD AUTO: 0.05 K/UL (ref 0–0.2)
BASOPHILS NFR BLD: 0.8 % (ref 0–1.9)
BILIRUB SERPL-MCNC: 1.3 MG/DL (ref 0.1–1)
BUN SERPL-MCNC: 16 MG/DL (ref 8–23)
CALCIUM SERPL-MCNC: 9.6 MG/DL (ref 8.7–10.5)
CHLORIDE SERPL-SCNC: 104 MMOL/L (ref 95–110)
CO2 SERPL-SCNC: 29 MMOL/L (ref 23–29)
CREAT SERPL-MCNC: 1.1 MG/DL (ref 0.5–1.4)
DIFFERENTIAL METHOD: ABNORMAL
EOSINOPHIL # BLD AUTO: 0.1 K/UL (ref 0–0.5)
EOSINOPHIL NFR BLD: 2 % (ref 0–8)
ERYTHROCYTE [DISTWIDTH] IN BLOOD BY AUTOMATED COUNT: 15.6 % (ref 11.5–14.5)
EST. GFR  (AFRICAN AMERICAN): >60 ML/MIN/1.73 M^2
EST. GFR  (NON AFRICAN AMERICAN): >60 ML/MIN/1.73 M^2
GLUCOSE SERPL-MCNC: 89 MG/DL (ref 70–110)
HCT VFR BLD AUTO: 45.6 % (ref 40–54)
HGB BLD-MCNC: 14.4 G/DL (ref 14–18)
IMM GRANULOCYTES # BLD AUTO: 0.01 K/UL (ref 0–0.04)
IMM GRANULOCYTES NFR BLD AUTO: 0.2 % (ref 0–0.5)
LYMPHOCYTES # BLD AUTO: 3.6 K/UL (ref 1–4.8)
LYMPHOCYTES NFR BLD: 56.7 % (ref 18–48)
MCH RBC QN AUTO: 27.5 PG (ref 27–31)
MCHC RBC AUTO-ENTMCNC: 31.6 G/DL (ref 32–36)
MCV RBC AUTO: 87 FL (ref 82–98)
MONOCYTES # BLD AUTO: 0.5 K/UL (ref 0.3–1)
MONOCYTES NFR BLD: 8.4 % (ref 4–15)
NEUTROPHILS # BLD AUTO: 2 K/UL (ref 1.8–7.7)
NEUTROPHILS NFR BLD: 31.9 % (ref 38–73)
NRBC BLD-RTO: 0 /100 WBC
PLATELET # BLD AUTO: 288 K/UL (ref 150–350)
PMV BLD AUTO: 11.7 FL (ref 9.2–12.9)
POTASSIUM SERPL-SCNC: 4 MMOL/L (ref 3.5–5.1)
PROT SERPL-MCNC: 8.1 G/DL (ref 6–8.4)
RBC # BLD AUTO: 5.24 M/UL (ref 4.6–6.2)
SODIUM SERPL-SCNC: 142 MMOL/L (ref 136–145)
WBC # BLD AUTO: 6.4 K/UL (ref 3.9–12.7)

## 2019-09-25 PROCEDURE — 3075F SYST BP GE 130 - 139MM HG: CPT | Mod: HCNC,CPTII,S$GLB, | Performed by: INTERNAL MEDICINE

## 2019-09-25 PROCEDURE — 99999 PR PBB SHADOW E&M-EST. PATIENT-LVL III: ICD-10-PCS | Mod: PBBFAC,HCNC,, | Performed by: INTERNAL MEDICINE

## 2019-09-25 PROCEDURE — 36415 COLL VENOUS BLD VENIPUNCTURE: CPT | Mod: HCNC,PO

## 2019-09-25 PROCEDURE — 3075F PR MOST RECENT SYSTOLIC BLOOD PRESS GE 130-139MM HG: ICD-10-PCS | Mod: HCNC,CPTII,S$GLB, | Performed by: INTERNAL MEDICINE

## 2019-09-25 PROCEDURE — 80053 COMPREHEN METABOLIC PANEL: CPT | Mod: HCNC

## 2019-09-25 PROCEDURE — 99214 PR OFFICE/OUTPT VISIT, EST, LEVL IV, 30-39 MIN: ICD-10-PCS | Mod: HCNC,S$GLB,, | Performed by: INTERNAL MEDICINE

## 2019-09-25 PROCEDURE — 1101F PR PT FALLS ASSESS DOC 0-1 FALLS W/OUT INJ PAST YR: ICD-10-PCS | Mod: HCNC,CPTII,S$GLB, | Performed by: INTERNAL MEDICINE

## 2019-09-25 PROCEDURE — 1101F PT FALLS ASSESS-DOCD LE1/YR: CPT | Mod: HCNC,CPTII,S$GLB, | Performed by: INTERNAL MEDICINE

## 2019-09-25 PROCEDURE — 99214 OFFICE O/P EST MOD 30 MIN: CPT | Mod: HCNC,S$GLB,, | Performed by: INTERNAL MEDICINE

## 2019-09-25 PROCEDURE — 85025 COMPLETE CBC W/AUTO DIFF WBC: CPT | Mod: HCNC

## 2019-09-25 PROCEDURE — 3079F PR MOST RECENT DIASTOLIC BLOOD PRESSURE 80-89 MM HG: ICD-10-PCS | Mod: HCNC,CPTII,S$GLB, | Performed by: INTERNAL MEDICINE

## 2019-09-25 PROCEDURE — 99999 PR PBB SHADOW E&M-EST. PATIENT-LVL III: CPT | Mod: PBBFAC,HCNC,, | Performed by: INTERNAL MEDICINE

## 2019-09-25 PROCEDURE — 3079F DIAST BP 80-89 MM HG: CPT | Mod: HCNC,CPTII,S$GLB, | Performed by: INTERNAL MEDICINE

## 2019-09-25 RX ORDER — LOSARTAN POTASSIUM 100 MG/1
100 TABLET ORAL DAILY
Qty: 90 TABLET | Refills: 3 | Status: SHIPPED | OUTPATIENT
Start: 2019-09-25 | End: 2020-03-24 | Stop reason: SDUPTHER

## 2019-09-25 NOTE — PROGRESS NOTES
Subjective:       Patient ID: Chinmay Mei is a 85 y.o. male.    Chief Complaint: Follow-up; Hypertension; and Hyperlipidemia    Follow-up   Associated symptoms include arthralgias and myalgias. Pertinent negatives include no abdominal pain, chest pain, chills, coughing, diaphoresis, fatigue, fever, headaches, joint swelling, nausea, neck pain, numbness, rash, sore throat, vomiting or weakness.   Hypertension   Pertinent negatives include no chest pain, headaches, neck pain, palpitations or shortness of breath.   Hyperlipidemia   Associated symptoms include myalgias. Pertinent negatives include no chest pain or shortness of breath.     Past Medical History:   Diagnosis Date    Arthritis     Hyperlipidemia     Hypertension     Open angle with borderline findings, low risk, bilateral 8/3/2018     Past Surgical History:   Procedure Laterality Date    CATARACT EXTRACTION W/  INTRAOCULAR LENS IMPLANT Left 02/22/2017    CATARACT EXTRACTION W/  INTRAOCULAR LENS IMPLANT Right 08/15/2018     Family History   Problem Relation Age of Onset    Heart disease Mother     Hypertension Mother     Prostate cancer Father     Diabetes Father     Diabetes Sister     Heart disease Sister     Glaucoma Sister     Hypertension Sister     Ulcers Sister     Heart disease Brother     Diabetes Brother     Hypertension Brother     Kidney disease Daughter         dialysis    Stomach cancer Daughter     Diabetes Son     Hypertension Son     Stroke Brother      Social History     Socioeconomic History    Marital status:      Spouse name: Not on file    Number of children: Not on file    Years of education: Not on file    Highest education level: Not on file   Occupational History    Not on file   Social Needs    Financial resource strain: Not on file    Food insecurity:     Worry: Not on file     Inability: Not on file    Transportation needs:     Medical: Not on file     Non-medical: Not on file   Tobacco  Use    Smoking status: Former Smoker     Last attempt to quit: 2006     Years since quittin.1    Smokeless tobacco: Never Used   Substance and Sexual Activity    Alcohol use: No    Drug use: No    Sexual activity: Not on file   Lifestyle    Physical activity:     Days per week: Not on file     Minutes per session: Not on file    Stress: Not on file   Relationships    Social connections:     Talks on phone: Not on file     Gets together: Not on file     Attends Moravian service: Not on file     Active member of club or organization: Not on file     Attends meetings of clubs or organizations: Not on file     Relationship status: Not on file   Other Topics Concern    Not on file   Social History Narrative    Not on file     Review of Systems   Constitutional: Negative for activity change, appetite change, chills, diaphoresis, fatigue, fever and unexpected weight change.   HENT: Negative for drooling, ear discharge, ear pain, facial swelling, hearing loss, mouth sores, nosebleeds, postnasal drip, rhinorrhea, sinus pressure, sneezing, sore throat, tinnitus, trouble swallowing and voice change.    Eyes: Negative for photophobia, redness and visual disturbance.   Respiratory: Negative for apnea, cough, choking, chest tightness, shortness of breath and wheezing.    Cardiovascular: Negative for chest pain, palpitations and leg swelling.   Gastrointestinal: Negative for abdominal distention, abdominal pain, anal bleeding, blood in stool, constipation, diarrhea, nausea and vomiting.   Endocrine: Negative for cold intolerance, heat intolerance, polydipsia, polyphagia and polyuria.   Genitourinary: Negative for difficulty urinating, dysuria, enuresis, flank pain, frequency, genital sores, hematuria and urgency.   Musculoskeletal: Positive for arthralgias and myalgias. Negative for back pain, gait problem, joint swelling, neck pain and neck stiffness.   Skin: Negative for color change, pallor, rash and wound.    Allergic/Immunologic: Negative for food allergies and immunocompromised state.   Neurological: Negative for dizziness, tremors, seizures, syncope, facial asymmetry, speech difficulty, weakness, light-headedness, numbness and headaches.   Hematological: Negative for adenopathy. Does not bruise/bleed easily.   Psychiatric/Behavioral: Negative for agitation, behavioral problems, confusion, decreased concentration, dysphoric mood, hallucinations, self-injury, sleep disturbance and suicidal ideas. The patient is not nervous/anxious and is not hyperactive.        Objective:      Physical Exam   Constitutional: He is oriented to person, place, and time. He appears well-developed and well-nourished. No distress.   HENT:   Head: Normocephalic and atraumatic.   Eyes: Pupils are equal, round, and reactive to light.   Neck: Normal range of motion. Neck supple. No JVD present. Carotid bruit is not present. No tracheal deviation present. No thyromegaly present.   Cardiovascular: Normal rate, regular rhythm and intact distal pulses.   Murmur heard.  Pulmonary/Chest: Effort normal and breath sounds normal. No respiratory distress. He has no wheezes. He has no rales. He exhibits no tenderness.   Abdominal: Soft. Bowel sounds are normal. He exhibits no distension. There is no tenderness. There is no rebound and no guarding.   Musculoskeletal: Normal range of motion. He exhibits no edema or tenderness.   Lymphadenopathy:     He has no cervical adenopathy.   Neurological: He is alert and oriented to person, place, and time.   Skin: Skin is warm and dry. No rash noted. He is not diaphoretic. No erythema. No pallor.   Psychiatric: He has a normal mood and affect. His behavior is normal. Judgment and thought content normal.   Nursing note and vitals reviewed.      CMP  Sodium   Date Value Ref Range Status   03/25/2019 140 136 - 145 mmol/L Final     Potassium   Date Value Ref Range Status   03/25/2019 4.1 3.5 - 5.1 mmol/L Final      Chloride   Date Value Ref Range Status   03/25/2019 103 95 - 110 mmol/L Final     CO2   Date Value Ref Range Status   03/25/2019 26 23 - 29 mmol/L Final     Glucose   Date Value Ref Range Status   03/25/2019 80 70 - 110 mg/dL Final     BUN, Bld   Date Value Ref Range Status   03/25/2019 22 8 - 23 mg/dL Final     Creatinine   Date Value Ref Range Status   03/25/2019 1.0 0.5 - 1.4 mg/dL Final     Calcium   Date Value Ref Range Status   03/25/2019 9.7 8.7 - 10.5 mg/dL Final     Total Protein   Date Value Ref Range Status   03/25/2019 7.4 6.0 - 8.4 g/dL Final     Albumin   Date Value Ref Range Status   03/25/2019 3.9 3.5 - 5.2 g/dL Final     Total Bilirubin   Date Value Ref Range Status   03/25/2019 0.7 0.1 - 1.0 mg/dL Final     Comment:     For infants and newborns, interpretation of results should be based  on gestational age, weight and in agreement with clinical  observations.  Premature Infant recommended reference ranges:  Up to 24 hours.............<8.0 mg/dL  Up to 48 hours............<12.0 mg/dL  3-5 days..................<15.0 mg/dL  6-29 days.................<15.0 mg/dL       Alkaline Phosphatase   Date Value Ref Range Status   03/25/2019 88 55 - 135 U/L Final     AST   Date Value Ref Range Status   03/25/2019 18 10 - 40 U/L Final     ALT   Date Value Ref Range Status   03/25/2019 14 10 - 44 U/L Final     Anion Gap   Date Value Ref Range Status   03/25/2019 11 8 - 16 mmol/L Final     eGFR if    Date Value Ref Range Status   03/25/2019 >60.0 >60 mL/min/1.73 m^2 Final     eGFR if non    Date Value Ref Range Status   03/25/2019 >60.0 >60 mL/min/1.73 m^2 Final     Comment:     Calculation used to obtain the estimated glomerular filtration  rate (eGFR) is the CKD-EPI equation.        Lab Results   Component Value Date    WBC 6.56 03/25/2019    HGB 13.2 (L) 03/25/2019    HCT 42.4 03/25/2019    MCV 87 03/25/2019     03/25/2019     Lab Results   Component Value Date    CHOL  198 03/25/2019     Lab Results   Component Value Date    HDL 32 (L) 03/25/2019     Lab Results   Component Value Date    LDLCALC 128.0 03/25/2019     Lab Results   Component Value Date    TRIG 190 (H) 03/25/2019     Lab Results   Component Value Date    CHOLHDL 16.2 (L) 03/25/2019     Lab Results   Component Value Date    TSH 1.032 03/07/2018     No results found for: LABA1C, HGBA1C  Assessment:       1. Essential hypertension    2. Hypercholesteremia    3. Aortic valve sclerosis        Plan:   Essential hypertension  -     Comprehensive metabolic panel; Future; Expected date: 09/25/2019  -     CBC auto differential; Future; Expected date: 09/25/2019    Hypercholesteremia    Aortic valve sclerosis    Other orders  -     losartan (COZAAR) 100 MG tablet; Take 1 tablet (100 mg total) by mouth once daily.  Dispense: 90 tablet; Refill: 3    Stable--------------------continue current meds.                   F/u 6 months.

## 2019-09-26 ENCOUNTER — TELEPHONE (OUTPATIENT)
Dept: FAMILY MEDICINE | Facility: CLINIC | Age: 84
End: 2019-09-26

## 2019-09-26 NOTE — TELEPHONE ENCOUNTER
----- Message from Nicole Solis sent at 9/26/2019  8:28 AM CDT -----  Contact: Lila/ex wife [phone]  States that she received another pt discharge summary in with the pt discharge summary. Please call back at [phone]//thank you acc

## 2019-10-03 ENCOUNTER — OFFICE VISIT (OUTPATIENT)
Dept: OPHTHALMOLOGY | Facility: CLINIC | Age: 84
End: 2019-10-03
Payer: MEDICARE

## 2019-10-03 DIAGNOSIS — H40.013 OPEN ANGLE WITH BORDERLINE FINDINGS, LOW RISK, BILATERAL: Primary | ICD-10-CM

## 2019-10-03 DIAGNOSIS — Z96.1 PSEUDOPHAKIA: ICD-10-CM

## 2019-10-03 PROCEDURE — 99999 PR PBB SHADOW E&M-EST. PATIENT-LVL I: CPT | Mod: PBBFAC,HCNC,, | Performed by: OPHTHALMOLOGY

## 2019-10-03 PROCEDURE — 92012 INTRM OPH EXAM EST PATIENT: CPT | Mod: HCNC,S$GLB,, | Performed by: OPHTHALMOLOGY

## 2019-10-03 PROCEDURE — 99999 PR PBB SHADOW E&M-EST. PATIENT-LVL I: ICD-10-PCS | Mod: PBBFAC,HCNC,, | Performed by: OPHTHALMOLOGY

## 2019-10-03 PROCEDURE — 92133 POSTERIOR SEGMENT OCT OPTIC NERVE(OCULAR COHERENCE TOMOGRAPHY) - OU - BOTH EYES: ICD-10-PCS | Mod: HCNC,S$GLB,, | Performed by: OPHTHALMOLOGY

## 2019-10-03 PROCEDURE — 92133 CPTRZD OPH DX IMG PST SGM ON: CPT | Mod: HCNC,S$GLB,, | Performed by: OPHTHALMOLOGY

## 2019-10-03 PROCEDURE — 92012 PR EYE EXAM, EST PATIENT,INTERMED: ICD-10-PCS | Mod: HCNC,S$GLB,, | Performed by: OPHTHALMOLOGY

## 2019-10-03 NOTE — PROGRESS NOTES
SUBJECTIVE:   Chinmay Mei is a 85 y.o. male   Corrected distance visual acuity was 20/25 in the right eye and 20/20 -1 in the left eye.   Chief Complaint   Patient presents with    Glaucoma     6 months iop check and GOCT         HPI:  HPI     Glaucoma      Additional comments: 6 months iop check and GOCT               Comments     1.PC IOL OD +21.5 SN60WF (distance) 8/16/18  PCIOL OS +21.0 SN60WF 2-22-17 (Distance)  2. COAG Suspect (+FHX-son Chinmay is my pt)  3. RE          Last edited by Margareth March MA on 10/3/2019 11:21 AM. (History)        Assessment /Plan :  1. Open angle with borderline findings, low risk, bilateral No evidence of glaucoma at this time but based on risk factors recommend to continue monitoring.     2. Pseudophakia  -- Condition stable, no therapeutic change required. Monitoring routinely.       Return to clinic in 6 months  or as needed.  With Dilation, HVF 24-2 and SDP

## 2019-11-19 ENCOUNTER — OFFICE VISIT (OUTPATIENT)
Dept: NEUROLOGY | Facility: CLINIC | Age: 84
End: 2019-11-19
Payer: MEDICARE

## 2019-11-19 VITALS
BODY MASS INDEX: 26.51 KG/M2 | HEIGHT: 74 IN | WEIGHT: 206.56 LBS | SYSTOLIC BLOOD PRESSURE: 142 MMHG | DIASTOLIC BLOOD PRESSURE: 80 MMHG | HEART RATE: 78 BPM

## 2019-11-19 DIAGNOSIS — I35.8 AORTIC VALVE SCLEROSIS: ICD-10-CM

## 2019-11-19 DIAGNOSIS — G31.84 MILD COGNITIVE IMPAIRMENT WITH MEMORY LOSS: Primary | ICD-10-CM

## 2019-11-19 PROCEDURE — 3077F PR MOST RECENT SYSTOLIC BLOOD PRESSURE >= 140 MM HG: ICD-10-PCS | Mod: HCNC,CPTII,S$GLB, | Performed by: PSYCHIATRY & NEUROLOGY

## 2019-11-19 PROCEDURE — 99999 PR PBB SHADOW E&M-EST. PATIENT-LVL III: CPT | Mod: PBBFAC,HCNC,, | Performed by: PSYCHIATRY & NEUROLOGY

## 2019-11-19 PROCEDURE — 1126F PR PAIN SEVERITY QUANTIFIED, NO PAIN PRESENT: ICD-10-PCS | Mod: HCNC,S$GLB,, | Performed by: PSYCHIATRY & NEUROLOGY

## 2019-11-19 PROCEDURE — 1159F MED LIST DOCD IN RCRD: CPT | Mod: HCNC,S$GLB,, | Performed by: PSYCHIATRY & NEUROLOGY

## 2019-11-19 PROCEDURE — 99999 PR PBB SHADOW E&M-EST. PATIENT-LVL III: ICD-10-PCS | Mod: PBBFAC,HCNC,, | Performed by: PSYCHIATRY & NEUROLOGY

## 2019-11-19 PROCEDURE — 99204 PR OFFICE/OUTPT VISIT, NEW, LEVL IV, 45-59 MIN: ICD-10-PCS | Mod: HCNC,S$GLB,, | Performed by: PSYCHIATRY & NEUROLOGY

## 2019-11-19 PROCEDURE — 1126F AMNT PAIN NOTED NONE PRSNT: CPT | Mod: HCNC,S$GLB,, | Performed by: PSYCHIATRY & NEUROLOGY

## 2019-11-19 PROCEDURE — 1159F PR MEDICATION LIST DOCUMENTED IN MEDICAL RECORD: ICD-10-PCS | Mod: HCNC,S$GLB,, | Performed by: PSYCHIATRY & NEUROLOGY

## 2019-11-19 PROCEDURE — 3079F PR MOST RECENT DIASTOLIC BLOOD PRESSURE 80-89 MM HG: ICD-10-PCS | Mod: HCNC,CPTII,S$GLB, | Performed by: PSYCHIATRY & NEUROLOGY

## 2019-11-19 PROCEDURE — 1101F PT FALLS ASSESS-DOCD LE1/YR: CPT | Mod: HCNC,CPTII,S$GLB, | Performed by: PSYCHIATRY & NEUROLOGY

## 2019-11-19 PROCEDURE — 99204 OFFICE O/P NEW MOD 45 MIN: CPT | Mod: HCNC,S$GLB,, | Performed by: PSYCHIATRY & NEUROLOGY

## 2019-11-19 PROCEDURE — 1101F PR PT FALLS ASSESS DOC 0-1 FALLS W/OUT INJ PAST YR: ICD-10-PCS | Mod: HCNC,CPTII,S$GLB, | Performed by: PSYCHIATRY & NEUROLOGY

## 2019-11-19 PROCEDURE — 3077F SYST BP >= 140 MM HG: CPT | Mod: HCNC,CPTII,S$GLB, | Performed by: PSYCHIATRY & NEUROLOGY

## 2019-11-19 PROCEDURE — 3079F DIAST BP 80-89 MM HG: CPT | Mod: HCNC,CPTII,S$GLB, | Performed by: PSYCHIATRY & NEUROLOGY

## 2019-11-19 RX ORDER — DONEPEZIL HYDROCHLORIDE 5 MG/1
5 TABLET, FILM COATED ORAL NIGHTLY
Qty: 30 TABLET | Refills: 0 | Status: SHIPPED | OUTPATIENT
Start: 2019-11-19 | End: 2019-12-26

## 2019-11-19 NOTE — LETTER
November 19, 2019      Luis E Leach MD  [address]  [facility] [address]           [facility]rology  [address] [place] [id number]  Phone: [phone]  Fax: [phone]          Patient: Chinmay Mei   MR Number: [id number]   YOB: 1933   Date of Visit: 11/19/2019       Dear Dr. Luis E Leach:    Thank you for referring Chinmay Mei to me for evaluation. Attached you will find relevant portions of my assessment and plan of care.    If you have questions, please do not hesitate to call me. I look forward to following Chinmay Mei along with you.    Sincerely,    Larry Perez MD    Enclosure  CC:  No Recipients    If you would like to receive this communication electronically, please contact [email or web address] or [phone] to request more information on [employer] access.    For providers and/or their staff who would like to refer a patient to [facility], please contact us through our one-stop-shop provider referral line, [facility], at [phone].    If you feel you have received this communication in error or would no longer like to receive these types of communications, please e-mail [email or web address]

## 2019-11-19 NOTE — PROGRESS NOTES
Subjective:      Patient ID: Chinmay Mei is a 85 y.o. male.    Chief Complaint: He is having trouble with his memory     The patient's ex-wife who apparently is still very close to the patient and works with him on almost daily basis indicates that she has noted that he has a tendency to be repetitious in questions and comments.  The patient's significant other further indicates that he frequently misplaces objects and has difficulty finding them again.  The patient however continues to live alone and independently even though his significant other monitors his activities very closely.  He is independent for dressing, bathing, hygiene, and simple household task.  In fact, the patient still is working part-time as a  [employer].  The patient and his ex-wife report that his distant and remote memory is very good     There has not been any noticed weakness, awkwardness or clumsiness of his arms or legs.  He is able to communicate without difficulty and without obvious word-finding difficulty.  He denies any dizziness.  He denies any headache.  He denies any seizure or unexplained loss of consciousness.  He specifically denies any auditory or visual hallucinations.  The patient's significant other indicates that he has not voiced her any complaints.    The patient's significant other does monitor and manage his financial affairs for him.  Even though they are now , she states that she sees him on a daily basis and is still very close to him.          ROS:  GENERAL: NO FEVER, CHILLS, FATIGABILITY OR WEIGHT LOSS.  SKIN: NO RASHES, ITCHING OR CHANGES IN COLOR OR TEXTURE OF SKIN.  HEAD: NO HEADACHES OR RECENT HEAD TRAUMA.  EYES: VISUAL ACUITY FINE. NO PHOTOPHOBIA, OCULAR PAIN OR DIPLOPIA.  EARS: DENIES EAR PAIN, DISCHARGE OR VERTIGO.  NOSE: NO LOSS OF SMELL, NO EPISTAXIS OR POSTNASAL DRIP.  MOUTH & THROAT: NO HOARSENESS OR CHANGE IN VOICE. NO EXCESSIVE GUM BLEEDING.  NODES: DENIES SWOLLEN  GLANDS.  CHEST: DENIES GARCÍA, CYANOSIS, WHEEZING, COUGH AND SPUTUM PRODUCTION.  CARDIOVASCULAR: DENIES CHEST PAIN, PND, ORTHOPNEA OR REDUCED EXERCISE TOLERANCE.  ABDOMEN: APPETITE FINE. NO WEIGHT LOSS. DENIES DIARRHEA, ABDOMINAL PAIN, HEMATEMESIS OR BLOOD IN STOOL.  URINARY: NO FLANK PAIN, DYSURIA OR HEMATURIA.  PERIPHERAL VASCULAR: NO CLAUDICATION OR CYANOSIS.  MUSCULOSKELETAL: NO JOINT STIFFNESS OR SWELLING. DENIES BACK PAIN.  NEUROLOGIC: NO HISTORY OF SEIZURES, PARALYSIS, ALTERATION OF GAIT OR COORDINATION.    Past Medical History:   Diagnosis Date    Arthritis     Hyperlipidemia     Hypertension     Open angle with borderline findings, low risk, bilateral 8/3/2018     Past Surgical History:   Procedure Laterality Date    CATARACT EXTRACTION W/  INTRAOCULAR LENS IMPLANT Left 2017    CATARACT EXTRACTION W/  INTRAOCULAR LENS IMPLANT Right 08/15/2018     Family History   Problem Relation Age of Onset    Heart disease Mother     Hypertension Mother     Prostate cancer Father     Diabetes Father     Diabetes Sister     Heart disease Sister     Glaucoma Sister     Hypertension Sister     Ulcers Sister     Heart disease Brother     Diabetes Brother     Hypertension Brother     Kidney disease Daughter         dialysis    Stomach cancer Daughter     Diabetes Son     Hypertension Son     Stroke Brother      Social History     Socioeconomic History    Marital status:      Spouse name: Not on file    Number of children: Not on file    Years of education: Not on file    Highest education level: Not on file   Occupational History    Not on file   Social Needs    Financial resource strain: Not on file    Food insecurity:     Worry: Not on file     Inability: Not on file    Transportation needs:     Medical: Not on file     Non-medical: Not on file   Tobacco Use    Smoking status: Former Smoker     Last attempt to quit: 2006     Years since quittin.3    Smokeless tobacco:  Never Used   Substance and Sexual Activity    Alcohol use: No    Drug use: No    Sexual activity: Not on file   Lifestyle    Physical activity:     Days per week: Not on file     Minutes per session: Not on file    Stress: Not on file   Relationships    Social connections:     Talks on phone: Not on file     Gets together: Not on file     Attends Anglican service: Not on file     Active member of club or organization: Not on file     Attends meetings of clubs or organizations: Not on file     Relationship status: Not on file   Other Topics Concern    Not on file   Social History Narrative    Not on file         Objective:   PE:   VITAL SIGNS:   HEIGHT 6 FT 2 IN, WEIGHT 93.7 KG, BMI 26.52  Vitals:    11/19/19 1301   BP: (!) 142/80   Pulse: 78     APPEARANCE: WELL NOURISHED, WELL DEVELOPED, IN NO ACUTE DISTRESS.    HEAD: NORMOCEPHALIC, ATRAUMATIC.  EYES: PERRL. EOMI.  NON-ICTERIC SCLERAE.    NOSE: MUCOSA PINK. AIRWAY CLEAR.  MOUTH & THROAT: NO TONSILLAR ENLARGEMENT. NO PHARYNGEAL ERYTHEMA OR EXUDATE. NO STRIDOR.  NECK: SUPPLE. NO BRUITS.  CHEST: LUNGS CLEAR TO AUSCULTATION.  CARDIOVASCULAR: REGULAR RHYTHM   WITH A LOUD SYSTOLIC EJECTION MURMUR HEARD THROUGHOUT THE PRECORDIUM INTO THE BASE OF THE NECK.  ABDOMEN: BOWEL SOUNDS NORMAL. NOT DISTENDED.   MUSCULOSKELETAL:  NO BONY DEFORMITY SEEN.  MUSCLE TONE  AND MUSCLE MASS ARE NORMAL IN BOTH UPPER AND BOTH LOWER EXTREMITIES.    NEUROLOGIC:   MENTAL STATUS:  THE PATIENT IS WELL ORIENTED TO PERSON, TIME, PLACE, AND SITUATION.  THE PATIENT WAS UNABLE TO RECALL 0/5 WORDS ON DELAYED RECALL.  HE WAS NOT ABLE TO REPEAT SENTENCES VERBATIM.  HE WAS ABLE TO NAME PICTURES OF COMMON ANIMALS WITHOUT DIFFICULTY.  HE WAS ABLE TO COMPLETE A 2 STEP COMMAND WITHOUT DIFFICULTY.  HIS SPEECH IS FLUENT WITHOUT WORD-FINDING DELAY ARE WORD-FINDING DIFFICULTY.  HE WAS ABLE TO REPEAT 5 DIGITS FORWARDS AND 3 DIGITS BACKWARDS.  THE PATIENT IS ATTENTIVE TO THE ENVIRONMENT AND COOPERATIVE FOR  THE EXAM.  CRANIAL NERVES: II-XII GROSSLY INTACT. FUNDOSCOPIC EXAM IS NORMAL.  NO HEMORRHAGE, EXUDATE OR PAPILLEDEMA IS PRESENT. THE EXTRAOCULAR MUSCLES ARE INTACT IN THE CARDINAL DIRECTIONS OF GAZE.  NO PTOSIS IS PRESENT. FACIAL FEATURES ARE SYMMETRICAL.  SPEECH IS NORMAL IN FLUENCY, DICTION, AND PHRASING.  TONGUE PROTRUDES IN THE MIDLINE.    GAIT AND STATION:  ROMBERG IS NEGATIVE.  GOOD ALTERNATE ARMSWING WITH NORMAL GAIT.  MOTOR:  NO DOWNDRIFT OF EITHER ARM WHEN HELD AT SHOULDER LEVEL.  MANUAL MUSCLE TESTING OF PROXIMAL AND DISTAL MUSCLES OF BOTH UPPER AND LOWER EXTREMITIES IS NORMAL. MUSCLE MASS IS NORMAL.  MUSCLE TONE IS NORMAL.  SENSORY:  INTACT BOTH UPPER AND LOWER EXTREMITIES TO PIN PRICK, TOUCH, AND VIBRATION.  CEREBELLAR:  FINGER TO NOSE DONE WELL.  ALTERNATING MOVEMENTS INTACT.  NO INVOLUNTARY MOVEMENTS OR TREMOR SEEN.  REFLEXES:  STRETCH REFLEXES ARE 2+ BOTH UPPER AND LOWER EXTREMITIES.  PLANTAR STIMULATION IS FLEXOR BILATERALLY AND NO PATHOLOGICAL REFLEXES ARE SEEN              Assessment:     Encounter Diagnoses   Name Primary?    Mild cognitive impairment with memory loss Yes    Aortic valve sclerosis        Discussion:  The patient and his significant other present a picture of mild cognitive impairment with recent memory impairment.  However he is independent for instrumental activities of daily living and continues to work part-time.  A long discussion was held with the patient and his ex-wife particular in regards to mild cognitive impairment and management of mild cognitive impairment.  We discussed at length the various medications and have indicated that there may or may not be benefit.  The patient and his ex-wife indicates that they would prefer trying the medication.      Plan:     1. Trial of Aricept 5 mg once a day for 30 days then increase to 10 mg once a day thereafter.  2. Continue follow-up with primary care and return to Neurology as needed.      This was a 55 min visit with the  patient and his ex-wife with over 50% of the time spent counseling the patient and the ex wife regarding the management of cognitive impairment and its relationship to dementia symptoms.  This note is generated with speech recognition software and is subject to transcription error and sound alike phrases that may be missed by proofreading.

## 2019-12-26 RX ORDER — DONEPEZIL HYDROCHLORIDE 10 MG/1
10 TABLET, FILM COATED ORAL NIGHTLY
Qty: 90 TABLET | Refills: 3 | Status: SHIPPED | OUTPATIENT
Start: 2019-12-26 | End: 2021-10-15

## 2020-03-23 ENCOUNTER — PATIENT MESSAGE (OUTPATIENT)
Dept: FAMILY MEDICINE | Facility: CLINIC | Age: 85
End: 2020-03-23

## 2020-03-23 NOTE — TELEPHONE ENCOUNTER
Pt have feet and ankle swelling and legs  Doesn't want lasix r/t incontinence     Unable to do telehealth because of no ipad or iphone

## 2020-03-24 ENCOUNTER — OFFICE VISIT (OUTPATIENT)
Dept: FAMILY MEDICINE | Facility: CLINIC | Age: 85
End: 2020-03-24
Payer: MEDICARE

## 2020-03-24 ENCOUNTER — PATIENT MESSAGE (OUTPATIENT)
Dept: FAMILY MEDICINE | Facility: CLINIC | Age: 85
End: 2020-03-24

## 2020-03-24 DIAGNOSIS — R60.9 EDEMA, UNSPECIFIED TYPE: Primary | ICD-10-CM

## 2020-03-24 PROCEDURE — 1159F PR MEDICATION LIST DOCUMENTED IN MEDICAL RECORD: ICD-10-PCS | Mod: HCNC,95,, | Performed by: INTERNAL MEDICINE

## 2020-03-24 PROCEDURE — 99213 OFFICE O/P EST LOW 20 MIN: CPT | Mod: HCNC,95,, | Performed by: INTERNAL MEDICINE

## 2020-03-24 PROCEDURE — 1159F MED LIST DOCD IN RCRD: CPT | Mod: HCNC,95,, | Performed by: INTERNAL MEDICINE

## 2020-03-24 PROCEDURE — 1101F PT FALLS ASSESS-DOCD LE1/YR: CPT | Mod: HCNC,CPTII,95, | Performed by: INTERNAL MEDICINE

## 2020-03-24 PROCEDURE — 99213 PR OFFICE/OUTPT VISIT, EST, LEVL III, 20-29 MIN: ICD-10-PCS | Mod: HCNC,95,, | Performed by: INTERNAL MEDICINE

## 2020-03-24 PROCEDURE — 1101F PR PT FALLS ASSESS DOC 0-1 FALLS W/OUT INJ PAST YR: ICD-10-PCS | Mod: HCNC,CPTII,95, | Performed by: INTERNAL MEDICINE

## 2020-03-24 RX ORDER — AMLODIPINE BESYLATE 5 MG/1
5 TABLET ORAL 2 TIMES DAILY
Qty: 180 TABLET | Refills: 3 | Status: SHIPPED | OUTPATIENT
Start: 2020-03-24 | End: 2021-04-01

## 2020-03-24 RX ORDER — HYDRALAZINE HYDROCHLORIDE 50 MG/1
50 TABLET, FILM COATED ORAL EVERY 12 HOURS
Qty: 180 TABLET | Refills: 3 | Status: SHIPPED | OUTPATIENT
Start: 2020-03-24 | End: 2021-04-01

## 2020-03-24 RX ORDER — LOSARTAN POTASSIUM 100 MG/1
100 TABLET ORAL DAILY
Qty: 90 TABLET | Refills: 3 | Status: SHIPPED | OUTPATIENT
Start: 2020-03-24 | End: 2021-04-01

## 2020-03-24 RX ORDER — HYDROCHLOROTHIAZIDE 12.5 MG/1
12.5 TABLET ORAL
Qty: 3 TABLET | Refills: 0 | Status: SHIPPED | OUTPATIENT
Start: 2020-03-24 | End: 2020-11-23

## 2020-03-24 NOTE — PROGRESS NOTES
Subjective:       Patient ID: Chinmay Mei is a 86 y.o. male.    Chief Complaint: Leg Swelling    The patient location is: home  The chief complaint leading to consultation is: leg/foot swelling  Visit type: Virtual visit with synchronous audio and video  Total time spent with patient: 20 minutes  Each patient to whom he or she provides medical services by telemedicine is:  (1) informed of the relationship between the physician and patient and the respective role of any other health care provider with respect to management of the patient; and (2) notified that he or she may decline to receive medical services by telemedicine and may withdraw from such care at any time.    Notes: pt c/o 3 weeks of bilat lower leg and foot swelling------------no fever or chills.           No redness.        Better when elevates legs.             No chest pain . No SOB------    Past Medical History:   Diagnosis Date    Arthritis     Hyperlipidemia     Hypertension     Open angle with borderline findings, low risk, bilateral 8/3/2018     Past Surgical History:   Procedure Laterality Date    CATARACT EXTRACTION W/  INTRAOCULAR LENS IMPLANT Left 02/22/2017    CATARACT EXTRACTION W/  INTRAOCULAR LENS IMPLANT Right 08/15/2018     Family History   Problem Relation Age of Onset    Heart disease Mother     Hypertension Mother     Prostate cancer Father     Diabetes Father     Diabetes Sister     Heart disease Sister     Glaucoma Sister     Hypertension Sister     Ulcers Sister     Heart disease Brother     Diabetes Brother     Hypertension Brother     Kidney disease Daughter         dialysis    Stomach cancer Daughter     Diabetes Son     Hypertension Son     Stroke Brother      Social History     Socioeconomic History    Marital status:      Spouse name: Not on file    Number of children: Not on file    Years of education: Not on file    Highest education level: Not on file   Occupational History    Not on  file   Social Needs    Financial resource strain: Not on file    Food insecurity:     Worry: Not on file     Inability: Not on file    Transportation needs:     Medical: Not on file     Non-medical: Not on file   Tobacco Use    Smoking status: Former Smoker     Last attempt to quit: 2006     Years since quittin.6    Smokeless tobacco: Never Used   Substance and Sexual Activity    Alcohol use: No    Drug use: No    Sexual activity: Not on file   Lifestyle    Physical activity:     Days per week: Not on file     Minutes per session: Not on file    Stress: Not on file   Relationships    Social connections:     Talks on phone: Not on file     Gets together: Not on file     Attends Anabaptism service: Not on file     Active member of club or organization: Not on file     Attends meetings of clubs or organizations: Not on file     Relationship status: Not on file   Other Topics Concern    Not on file   Social History Narrative    Not on file     Review of Systems   Constitutional: Negative for chills and fever.   HENT: Negative.    Respiratory: Negative for apnea, cough, choking, chest tightness, shortness of breath, wheezing and stridor.    Cardiovascular: Positive for leg swelling. Negative for chest pain and palpitations.   Gastrointestinal: Negative for abdominal pain, nausea and vomiting.   Neurological: Negative for dizziness, weakness and numbness.   Psychiatric/Behavioral: Negative for agitation, behavioral problems and confusion.       Objective:      Physical Exam   Constitutional: He is oriented to person, place, and time. He appears well-developed and well-nourished.   Pulmonary/Chest: Effort normal.   Musculoskeletal: He exhibits edema.   bilat 1+ edema lower extr,         No erythema--------   Neurological: He is alert and oriented to person, place, and time.   Nursing note and vitals reviewed.      CMP  Sodium   Date Value Ref Range Status   2019 142 136 - 145 mmol/L Final      Potassium   Date Value Ref Range Status   09/25/2019 4.0 3.5 - 5.1 mmol/L Final     Chloride   Date Value Ref Range Status   09/25/2019 104 95 - 110 mmol/L Final     CO2   Date Value Ref Range Status   09/25/2019 29 23 - 29 mmol/L Final     Glucose   Date Value Ref Range Status   09/25/2019 89 70 - 110 mg/dL Final     BUN, Bld   Date Value Ref Range Status   09/25/2019 16 8 - 23 mg/dL Final     Creatinine   Date Value Ref Range Status   09/25/2019 1.1 0.5 - 1.4 mg/dL Final     Calcium   Date Value Ref Range Status   09/25/2019 9.6 8.7 - 10.5 mg/dL Final     Total Protein   Date Value Ref Range Status   09/25/2019 8.1 6.0 - 8.4 g/dL Final     Albumin   Date Value Ref Range Status   09/25/2019 4.4 3.5 - 5.2 g/dL Final     Total Bilirubin   Date Value Ref Range Status   09/25/2019 1.3 (H) 0.1 - 1.0 mg/dL Final     Comment:     For infants and newborns, interpretation of results should be based  on gestational age, weight and in agreement with clinical  observations.  Premature Infant recommended reference ranges:  Up to 24 hours.............<8.0 mg/dL  Up to 48 hours............<12.0 mg/dL  3-5 days..................<15.0 mg/dL  6-29 days.................<15.0 mg/dL       Alkaline Phosphatase   Date Value Ref Range Status   09/25/2019 81 55 - 135 U/L Final     AST   Date Value Ref Range Status   09/25/2019 14 10 - 40 U/L Final     ALT   Date Value Ref Range Status   09/25/2019 11 10 - 44 U/L Final     Anion Gap   Date Value Ref Range Status   09/25/2019 9 8 - 16 mmol/L Final     eGFR if    Date Value Ref Range Status   09/25/2019 >60.0 >60 mL/min/1.73 m^2 Final     eGFR if non    Date Value Ref Range Status   09/25/2019 >60.0 >60 mL/min/1.73 m^2 Final     Comment:     Calculation used to obtain the estimated glomerular filtration  rate (eGFR) is the CKD-EPI equation.        Lab Results   Component Value Date    WBC 6.40 09/25/2019    HGB 14.4 09/25/2019    HCT 45.6 09/25/2019    MCV  87 09/25/2019     09/25/2019     Lab Results   Component Value Date    CHOL 198 03/25/2019     Lab Results   Component Value Date    HDL 32 (L) 03/25/2019     Lab Results   Component Value Date    LDLCALC 128.0 03/25/2019     Lab Results   Component Value Date    TRIG 190 (H) 03/25/2019     Lab Results   Component Value Date    CHOLHDL 16.2 (L) 03/25/2019     Lab Results   Component Value Date    TSH 1.032 03/07/2018     No results found for: LABA1C, HGBA1C  Assessment:       1. Edema, unspecified type        Plan:   Edema, unspecified type-------------------low salt, elevation,   hctz 12.5 mg qod x 3 doses--------------follow-------------    Other orders  -     hydroCHLOROthiazide (HYDRODIURIL) 12.5 MG Tab; Take 1 tablet (12.5 mg total) by mouth every 48 hours. for 3 doses  Dispense: 3 tablet; Refill: 0    Call if persists---------------

## 2020-07-24 ENCOUNTER — PATIENT OUTREACH (OUTPATIENT)
Dept: ADMINISTRATIVE | Facility: OTHER | Age: 85
End: 2020-07-24

## 2020-07-24 NOTE — PROGRESS NOTES
Requested updates within Care Everywhere.  Patient's chart was reviewed for overdue BEATA topics.  Immunizations not reconciled. Patient not found in LINKS.

## 2020-07-27 ENCOUNTER — HOSPITAL ENCOUNTER (INPATIENT)
Facility: HOSPITAL | Age: 85
LOS: 3 days | Discharge: NURSING, REHAB OR LONG-TERM CARE | DRG: 066 | End: 2020-07-30
Attending: EMERGENCY MEDICINE | Admitting: INTERNAL MEDICINE
Payer: MEDICARE

## 2020-07-27 DIAGNOSIS — R42 DIZZINESS: Primary | ICD-10-CM

## 2020-07-27 DIAGNOSIS — I63.9 CVA (CEREBRAL VASCULAR ACCIDENT): ICD-10-CM

## 2020-07-27 DIAGNOSIS — I10 CHRONIC HYPERTENSION: ICD-10-CM

## 2020-07-27 DIAGNOSIS — R53.1 LEFT-SIDED WEAKNESS: ICD-10-CM

## 2020-07-27 DIAGNOSIS — R27.0 ATAXIA: ICD-10-CM

## 2020-07-27 DIAGNOSIS — I63.012 CEREBROVASCULAR ACCIDENT (CVA) DUE TO THROMBOSIS OF LEFT VERTEBRAL ARTERY: ICD-10-CM

## 2020-07-27 DIAGNOSIS — I63.9 STROKE: ICD-10-CM

## 2020-07-27 PROBLEM — R29.898 WEAKNESS OF LEFT LOWER EXTREMITY: Status: ACTIVE | Noted: 2020-07-27

## 2020-07-27 LAB
ALBUMIN SERPL BCP-MCNC: 4.6 G/DL (ref 3.5–5.2)
ALP SERPL-CCNC: 88 U/L (ref 55–135)
ALT SERPL W/O P-5'-P-CCNC: 17 U/L (ref 10–44)
ANION GAP SERPL CALC-SCNC: 15 MMOL/L (ref 8–16)
AST SERPL-CCNC: 25 U/L (ref 10–40)
BASOPHILS # BLD AUTO: 0.01 K/UL (ref 0–0.2)
BASOPHILS NFR BLD: 0.1 % (ref 0–1.9)
BILIRUB SERPL-MCNC: 0.8 MG/DL (ref 0.1–1)
BILIRUB UR QL STRIP: NEGATIVE
BUN SERPL-MCNC: 12 MG/DL (ref 8–23)
CALCIUM SERPL-MCNC: 9.7 MG/DL (ref 8.7–10.5)
CHLORIDE SERPL-SCNC: 99 MMOL/L (ref 95–110)
CLARITY UR: CLEAR
CO2 SERPL-SCNC: 22 MMOL/L (ref 23–29)
COLOR UR: YELLOW
CREAT SERPL-MCNC: 1 MG/DL (ref 0.5–1.4)
DIFFERENTIAL METHOD: ABNORMAL
EOSINOPHIL # BLD AUTO: 0 K/UL (ref 0–0.5)
EOSINOPHIL NFR BLD: 0 % (ref 0–8)
ERYTHROCYTE [DISTWIDTH] IN BLOOD BY AUTOMATED COUNT: 15.4 % (ref 11.5–14.5)
EST. GFR  (AFRICAN AMERICAN): >60 ML/MIN/1.73 M^2
EST. GFR  (NON AFRICAN AMERICAN): >60 ML/MIN/1.73 M^2
GLUCOSE SERPL-MCNC: 143 MG/DL (ref 70–110)
GLUCOSE UR QL STRIP: ABNORMAL
HCT VFR BLD AUTO: 44.8 % (ref 40–54)
HGB BLD-MCNC: 14.2 G/DL (ref 14–18)
HGB UR QL STRIP: ABNORMAL
IMM GRANULOCYTES # BLD AUTO: 0.04 K/UL (ref 0–0.04)
IMM GRANULOCYTES NFR BLD AUTO: 0.4 % (ref 0–0.5)
INR PPP: 1 (ref 0.8–1.2)
KETONES UR QL STRIP: NEGATIVE
LEUKOCYTE ESTERASE UR QL STRIP: NEGATIVE
LYMPHOCYTES # BLD AUTO: 0.9 K/UL (ref 1–4.8)
LYMPHOCYTES NFR BLD: 8.7 % (ref 18–48)
MCH RBC QN AUTO: 26.8 PG (ref 27–31)
MCHC RBC AUTO-ENTMCNC: 31.7 G/DL (ref 32–36)
MCV RBC AUTO: 85 FL (ref 82–98)
MONOCYTES # BLD AUTO: 0.5 K/UL (ref 0.3–1)
MONOCYTES NFR BLD: 4.7 % (ref 4–15)
NEUTROPHILS # BLD AUTO: 8.6 K/UL (ref 1.8–7.7)
NEUTROPHILS NFR BLD: 86.1 % (ref 38–73)
NITRITE UR QL STRIP: NEGATIVE
NRBC BLD-RTO: 0 /100 WBC
PH UR STRIP: 8 [PH] (ref 5–8)
PLATELET # BLD AUTO: 268 K/UL (ref 150–350)
PMV BLD AUTO: 10.6 FL (ref 9.2–12.9)
POCT GLUCOSE: 153 MG/DL (ref 70–110)
POTASSIUM SERPL-SCNC: 4 MMOL/L (ref 3.5–5.1)
PROT SERPL-MCNC: 8.8 G/DL (ref 6–8.4)
PROT UR QL STRIP: NEGATIVE
PROTHROMBIN TIME: 10.6 SEC (ref 9–12.5)
RBC # BLD AUTO: 5.29 M/UL (ref 4.6–6.2)
SARS-COV-2 RDRP RESP QL NAA+PROBE: NEGATIVE
SODIUM SERPL-SCNC: 136 MMOL/L (ref 136–145)
SP GR UR STRIP: 1.02 (ref 1–1.03)
TSH SERPL DL<=0.005 MIU/L-ACNC: 0.45 UIU/ML (ref 0.4–4)
URN SPEC COLLECT METH UR: ABNORMAL
UROBILINOGEN UR STRIP-ACNC: NEGATIVE EU/DL
WBC # BLD AUTO: 10 K/UL (ref 3.9–12.7)

## 2020-07-27 PROCEDURE — 21400001 HC TELEMETRY ROOM: Mod: HCNC

## 2020-07-27 PROCEDURE — 83036 HEMOGLOBIN GLYCOSYLATED A1C: CPT | Mod: HCNC

## 2020-07-27 PROCEDURE — 25000003 PHARM REV CODE 250: Mod: HCNC | Performed by: NURSE PRACTITIONER

## 2020-07-27 PROCEDURE — 96372 THER/PROPH/DIAG INJ SC/IM: CPT | Mod: 59 | Performed by: EMERGENCY MEDICINE

## 2020-07-27 PROCEDURE — 81003 URINALYSIS AUTO W/O SCOPE: CPT | Mod: HCNC

## 2020-07-27 PROCEDURE — G0378 HOSPITAL OBSERVATION PER HR: HCPCS | Mod: HCNC

## 2020-07-27 PROCEDURE — 93010 ELECTROCARDIOGRAM REPORT: CPT | Mod: HCNC,,, | Performed by: INTERNAL MEDICINE

## 2020-07-27 PROCEDURE — 85610 PROTHROMBIN TIME: CPT | Mod: HCNC

## 2020-07-27 PROCEDURE — 85025 COMPLETE CBC W/AUTO DIFF WBC: CPT | Mod: HCNC

## 2020-07-27 PROCEDURE — 25000003 PHARM REV CODE 250: Mod: HCNC | Performed by: EMERGENCY MEDICINE

## 2020-07-27 PROCEDURE — G0426 INPT/ED TELECONSULT50: HCPCS | Mod: GT,HCNC,, | Performed by: PSYCHIATRY & NEUROLOGY

## 2020-07-27 PROCEDURE — 82962 GLUCOSE BLOOD TEST: CPT | Mod: HCNC

## 2020-07-27 PROCEDURE — 84443 ASSAY THYROID STIM HORMONE: CPT | Mod: HCNC

## 2020-07-27 PROCEDURE — G0426 PR INPT TELEHEALTH CONSULT 50M: ICD-10-PCS | Mod: GT,HCNC,, | Performed by: PSYCHIATRY & NEUROLOGY

## 2020-07-27 PROCEDURE — 80053 COMPREHEN METABOLIC PANEL: CPT | Mod: HCNC

## 2020-07-27 PROCEDURE — 99291 CRITICAL CARE FIRST HOUR: CPT | Mod: 25,HCNC

## 2020-07-27 PROCEDURE — 93010 EKG 12-LEAD: ICD-10-PCS | Mod: HCNC,,, | Performed by: INTERNAL MEDICINE

## 2020-07-27 PROCEDURE — U0002 COVID-19 LAB TEST NON-CDC: HCPCS | Mod: HCNC

## 2020-07-27 PROCEDURE — 25500020 PHARM REV CODE 255: Mod: HCNC | Performed by: EMERGENCY MEDICINE

## 2020-07-27 PROCEDURE — 80061 LIPID PANEL: CPT | Mod: HCNC

## 2020-07-27 PROCEDURE — 63600175 PHARM REV CODE 636 W HCPCS: Mod: HCNC | Performed by: NURSE PRACTITIONER

## 2020-07-27 PROCEDURE — 93005 ELECTROCARDIOGRAM TRACING: CPT | Mod: HCNC

## 2020-07-27 PROCEDURE — 36415 COLL VENOUS BLD VENIPUNCTURE: CPT | Mod: HCNC

## 2020-07-27 RX ORDER — CLOPIDOGREL BISULFATE 75 MG/1
75 TABLET ORAL
Status: COMPLETED | OUTPATIENT
Start: 2020-07-27 | End: 2020-07-27

## 2020-07-27 RX ORDER — ENOXAPARIN SODIUM 100 MG/ML
40 INJECTION SUBCUTANEOUS EVERY 24 HOURS
Status: DISCONTINUED | OUTPATIENT
Start: 2020-07-27 | End: 2020-07-30 | Stop reason: HOSPADM

## 2020-07-27 RX ORDER — PANTOPRAZOLE SODIUM 40 MG/1
40 TABLET, DELAYED RELEASE ORAL DAILY
Status: DISCONTINUED | OUTPATIENT
Start: 2020-07-28 | End: 2020-07-30 | Stop reason: HOSPADM

## 2020-07-27 RX ORDER — ACETAMINOPHEN 325 MG/1
650 TABLET ORAL EVERY 6 HOURS PRN
Status: DISCONTINUED | OUTPATIENT
Start: 2020-07-27 | End: 2020-07-30 | Stop reason: HOSPADM

## 2020-07-27 RX ORDER — CLOPIDOGREL BISULFATE 75 MG/1
225 TABLET ORAL ONCE
Status: COMPLETED | OUTPATIENT
Start: 2020-07-27 | End: 2020-07-27

## 2020-07-27 RX ORDER — ATORVASTATIN CALCIUM 40 MG/1
40 TABLET, FILM COATED ORAL NIGHTLY
Status: DISCONTINUED | OUTPATIENT
Start: 2020-07-27 | End: 2020-07-30 | Stop reason: HOSPADM

## 2020-07-27 RX ORDER — LABETALOL HYDROCHLORIDE 5 MG/ML
10 INJECTION, SOLUTION INTRAVENOUS EVERY 4 HOURS PRN
Status: DISCONTINUED | OUTPATIENT
Start: 2020-07-27 | End: 2020-07-30 | Stop reason: HOSPADM

## 2020-07-27 RX ORDER — DONEPEZIL HYDROCHLORIDE 5 MG/1
10 TABLET, FILM COATED ORAL NIGHTLY
Status: DISCONTINUED | OUTPATIENT
Start: 2020-07-27 | End: 2020-07-30 | Stop reason: HOSPADM

## 2020-07-27 RX ORDER — ONDANSETRON 2 MG/ML
4 INJECTION INTRAMUSCULAR; INTRAVENOUS EVERY 12 HOURS PRN
Status: DISCONTINUED | OUTPATIENT
Start: 2020-07-27 | End: 2020-07-30 | Stop reason: HOSPADM

## 2020-07-27 RX ORDER — ASPIRIN 325 MG
325 TABLET ORAL
Status: COMPLETED | OUTPATIENT
Start: 2020-07-27 | End: 2020-07-27

## 2020-07-27 RX ORDER — CLOPIDOGREL BISULFATE 75 MG/1
75 TABLET ORAL DAILY
Status: DISCONTINUED | OUTPATIENT
Start: 2020-07-28 | End: 2020-07-30 | Stop reason: HOSPADM

## 2020-07-27 RX ORDER — ASPIRIN 81 MG/1
81 TABLET ORAL DAILY
Status: DISCONTINUED | OUTPATIENT
Start: 2020-07-28 | End: 2020-07-30 | Stop reason: HOSPADM

## 2020-07-27 RX ORDER — SODIUM CHLORIDE 0.9 % (FLUSH) 0.9 %
10 SYRINGE (ML) INJECTION
Status: DISCONTINUED | OUTPATIENT
Start: 2020-07-27 | End: 2020-07-30 | Stop reason: HOSPADM

## 2020-07-27 RX ADMIN — ATORVASTATIN CALCIUM 40 MG: 40 TABLET, FILM COATED ORAL at 09:07

## 2020-07-27 RX ADMIN — ASPIRIN 325 MG ORAL TABLET 325 MG: 325 PILL ORAL at 04:07

## 2020-07-27 RX ADMIN — IOHEXOL 100 ML: 350 INJECTION, SOLUTION INTRAVENOUS at 06:07

## 2020-07-27 RX ADMIN — DONEPEZIL HYDROCHLORIDE 10 MG: 5 TABLET, FILM COATED ORAL at 09:07

## 2020-07-27 RX ADMIN — CLOPIDOGREL 75 MG: 75 TABLET, FILM COATED ORAL at 04:07

## 2020-07-27 RX ADMIN — CLOPIDOGREL 225 MG: 75 TABLET, FILM COATED ORAL at 09:07

## 2020-07-27 RX ADMIN — ENOXAPARIN SODIUM 40 MG: 40 INJECTION SUBCUTANEOUS at 09:07

## 2020-07-27 NOTE — ED PROVIDER NOTES
SCRIBE #1 NOTE: I, Laquita Harman, am scribing for, and in the presence of, Moni Soto MD. I have scribed the entire note.       History     Chief Complaint   Patient presents with    Fatigue     weakness since 10 am this morning, able to move all ext. leaning to left side.     Review of patient's allergies indicates:   Allergen Reactions    Aspirin      bleeding         History of Present Illness     HPI    7/27/2020, 3:58 PM  History obtained from the patient      History of Present Illness: Chinmay Mei is a 86 y.o. male patient with a PMHx of arthritis, hyperlipidemia, HTN, and open angle with borderline findings who presents to the Emergency Department for evaluation of dizziness which onset gradually about 6 hours ago. Symptoms are constant and moderate in severity. No mitigating or exacerbating factors reported. Associated sxs include feeling off balance. Pt is leaning to the left side and reports falling multiple times PTA. Patient denies any HA, n/v/d, light headedness, facial droop, slurred speech, and all other sxs at this time. No prior tx reported. No further complaints or concerns at this time.       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       Past Surgical History:  Past Surgical History:   Procedure Laterality Date    CATARACT EXTRACTION W/  INTRAOCULAR LENS IMPLANT Left 02/22/2017    CATARACT EXTRACTION W/  INTRAOCULAR LENS IMPLANT Right 08/15/2018         Family History:  Family History   Problem Relation Age of Onset    Heart disease Mother     Hypertension Mother     Prostate cancer Father     Diabetes Father     Diabetes Sister     Heart disease Sister     Glaucoma Sister     Hypertension Sister     Ulcers Sister      Heart disease Brother     Diabetes Brother     Hypertension Brother     Kidney disease Daughter         dialysis    Stomach cancer Daughter     Diabetes Son     Hypertension Son     Stroke Brother        Social History:  Social History     Tobacco Use    Smoking status: Former Smoker     Quit date: 2006     Years since quittin.0    Smokeless tobacco: Never Used   Substance and Sexual Activity    Alcohol use: No     Frequency: Never     Binge frequency: Never    Drug use: No    Sexual activity: Unknown        Review of Systems     Review of Systems   Constitutional: Negative for fever.   HENT: Negative for sore throat.    Respiratory: Negative for shortness of breath.    Cardiovascular: Negative for chest pain.   Gastrointestinal: Negative for diarrhea, nausea and vomiting.   Genitourinary: Negative for dysuria.   Musculoskeletal: Negative for back pain.   Skin: Negative for rash.   Neurological: Positive for dizziness. Negative for facial asymmetry, speech difficulty, weakness, light-headedness and headaches.   Hematological: Does not bruise/bleed easily.   All other systems reviewed and are negative.       Physical Exam     Initial Vitals [20 1551]   BP Pulse Resp Temp SpO2   (!) 186/90 91 16 98.1 °F (36.7 °C) 98 %      MAP       --          Physical Exam  Nursing Notes and Vital Signs Reviewed.  Constitutional: Patient is in no acute distress. Well-developed and well-nourished.  Head: Atraumatic. Normocephalic.  Eyes: PERRL. EOM intact. Conjunctivae are not pale. No scleral icterus.  ENT: Mucous membranes are moist. Oropharynx is clear and symmetric.    Neck: Supple. Full ROM. No lymphadenopathy.  Cardiovascular: Regular rate. Regular rhythm. No murmurs, rubs, or gallops. Distal pulses are 2+ and symmetric.  Pulmonary/Chest: No respiratory distress. Clear to auscultation bilaterally. No wheezing or rales.  Abdominal: Soft and non-distended.  There is no tenderness.  No rebound,  guarding, or rigidity. Good bowel sounds.  Genitourinary: No CVA tenderness. Pt urinated on himself.   Musculoskeletal: Moves all extremities. No obvious deformities. No edema. No calf tenderness.  Skin: Warm and dry.  Neurological: Patient is alert and oriented to person, place and time. Pupils ERRL and EOM normal. Cranial nerves II-XII are intact. Strength is full bilaterally; it is equal and 5/5 in bilateral upper and lower extremities. There is no pronator drift of outstretched arms. Light touch sense is intact. Speech is clear and normal.  No facial droop. Weakness when trying to lift left leg off of the bed. No acute focal neurological deficits noted.  Psychiatric: Normal affect. Good eye contact. Appropriate in content.     ED Course   Critical Care    Date/Time: 7/27/2020 5:59 PM  Performed by: Moni Soto MD  Authorized by: Moni Soto MD   Direct patient critical care time: 15 minutes  Additional history critical care time: 10 minutes  Ordering / reviewing critical care time: 10 minutes  Documentation critical care time: 10 minutes  Total critical care time (exclusive of procedural time) : 45 minutes  Critical care time was exclusive of separately billable procedures and treating other patients and teaching time.  Critical care was necessary to treat or prevent imminent or life-threatening deterioration of the following conditions: CBA.  Critical care was time spent personally by me on the following activities: blood draw for specimens, development of treatment plan with patient or surrogate, interpretation of cardiac output measurements, evaluation of patient's response to treatment, examination of patient, obtaining history from patient or surrogate, ordering and performing treatments and interventions, ordering and review of laboratory studies, ordering and review of radiographic studies, pulse oximetry, re-evaluation of patient's condition and review of old charts.        ED Vital  "Signs:  Vitals:    07/27/20 1551 07/27/20 1638 07/27/20 1659 07/27/20 1716   BP: (!) 186/90 (!) 189/88 (!) 184/91 (!) 163/86   Pulse: 91 90 84 77   Resp: 16 19 20 15   Temp: 98.1 °F (36.7 °C)      TempSrc: Oral      SpO2: 98%  99% 98%   Height: 6' 2" (1.88 m)       07/27/20 1731 07/27/20 1746 07/27/20 1801 07/27/20 1816   BP: (!) 187/84 (!) 171/92 (!) 173/95 (!) 179/95   Pulse: 80 75 72 71   Resp:  16 16 15   Temp:       TempSrc:       SpO2: 99% 98% 97% 99%   Height:           Abnormal Lab Results:  Labs Reviewed   CBC W/ AUTO DIFFERENTIAL - Abnormal; Notable for the following components:       Result Value    Mean Corpuscular Hemoglobin 26.8 (*)     Mean Corpuscular Hemoglobin Conc 31.7 (*)     RDW 15.4 (*)     Gran # (ANC) 8.6 (*)     Lymph # 0.9 (*)     Gran% 86.1 (*)     Lymph% 8.7 (*)     All other components within normal limits   COMPREHENSIVE METABOLIC PANEL - Abnormal; Notable for the following components:    CO2 22 (*)     Glucose 143 (*)     Total Protein 8.8 (*)     All other components within normal limits   URINALYSIS, REFLEX TO URINE CULTURE - Abnormal; Notable for the following components:    Glucose, UA 1+ (*)     Occult Blood UA Trace (*)     All other components within normal limits    Narrative:     Specimen Source->Urine   POCT GLUCOSE - Abnormal; Notable for the following components:    POCT Glucose 153 (*)     All other components within normal limits   PROTIME-INR   TSH   SARS-COV-2 RNA AMPLIFICATION, QUAL    Narrative:     admit   LIPID PANEL   POCT GLUCOSE, HAND-HELD DEVICE        All Lab Results:  Results for orders placed or performed during the hospital encounter of 07/27/20   CBC W/ AUTO DIFFERENTIAL   Result Value Ref Range    WBC 10.00 3.90 - 12.70 K/uL    RBC 5.29 4.60 - 6.20 M/uL    Hemoglobin 14.2 14.0 - 18.0 g/dL    Hematocrit 44.8 40.0 - 54.0 %    Mean Corpuscular Volume 85 82 - 98 fL    Mean Corpuscular Hemoglobin 26.8 (L) 27.0 - 31.0 pg    Mean Corpuscular Hemoglobin Conc 31.7 " (L) 32.0 - 36.0 g/dL    RDW 15.4 (H) 11.5 - 14.5 %    Platelets 268 150 - 350 K/uL    MPV 10.6 9.2 - 12.9 fL    Immature Granulocytes 0.4 0.0 - 0.5 %    Gran # (ANC) 8.6 (H) 1.8 - 7.7 K/uL    Immature Grans (Abs) 0.04 0.00 - 0.04 K/uL    Lymph # 0.9 (L) 1.0 - 4.8 K/uL    Mono # 0.5 0.3 - 1.0 K/uL    Eos # 0.0 0.0 - 0.5 K/uL    Baso # 0.01 0.00 - 0.20 K/uL    nRBC 0 0 /100 WBC    Gran% 86.1 (H) 38.0 - 73.0 %    Lymph% 8.7 (L) 18.0 - 48.0 %    Mono% 4.7 4.0 - 15.0 %    Eosinophil% 0.0 0.0 - 8.0 %    Basophil% 0.1 0.0 - 1.9 %    Differential Method Automated    Comprehensive metabolic panel   Result Value Ref Range    Sodium 136 136 - 145 mmol/L    Potassium 4.0 3.5 - 5.1 mmol/L    Chloride 99 95 - 110 mmol/L    CO2 22 (L) 23 - 29 mmol/L    Glucose 143 (H) 70 - 110 mg/dL    BUN, Bld 12 8 - 23 mg/dL    Creatinine 1.0 0.5 - 1.4 mg/dL    Calcium 9.7 8.7 - 10.5 mg/dL    Total Protein 8.8 (H) 6.0 - 8.4 g/dL    Albumin 4.6 3.5 - 5.2 g/dL    Total Bilirubin 0.8 0.1 - 1.0 mg/dL    Alkaline Phosphatase 88 55 - 135 U/L    AST 25 10 - 40 U/L    ALT 17 10 - 44 U/L    Anion Gap 15 8 - 16 mmol/L    eGFR if African American >60 >60 mL/min/1.73 m^2    eGFR if non African American >60 >60 mL/min/1.73 m^2   Protime-INR   Result Value Ref Range    Prothrombin Time 10.6 9.0 - 12.5 sec    INR 1.0 0.8 - 1.2   TSH   Result Value Ref Range    TSH 0.454 0.400 - 4.000 uIU/mL   Urinalysis, Reflex to Urine Culture Urine, Clean Catch    Specimen: Urine   Result Value Ref Range    Specimen UA Urine, Clean Catch     Color, UA Yellow Yellow, Straw, Latasha    Appearance, UA Clear Clear    pH, UA 8.0 5.0 - 8.0    Specific Gravity, UA 1.020 1.005 - 1.030    Protein, UA Negative Negative    Glucose, UA 1+ (A) Negative    Ketones, UA Negative Negative    Bilirubin (UA) Negative Negative    Occult Blood UA Trace (A) Negative    Nitrite, UA Negative Negative    Urobilinogen, UA Negative <2.0 EU/dL    Leukocytes, UA Negative Negative   COVID-19 Rapid  Screening   Result Value Ref Range    SARS-CoV-2 RNA, Amplification, Qual Negative Negative   POCT glucose   Result Value Ref Range    POCT Glucose 153 (H) 70 - 110 mg/dL         Imaging Results:  Imaging Results          CTA Head and Neck (xpd) (Final result)  Result time 07/27/20 19:06:56    Final result by Chinmay Love MD (07/27/20 19:06:56)                 Impression:      There is a short segment occlusion involving the distal left vertebral artery with some calcified plaque present suggesting underlying stenosis.  However there is apparent short segment of thrombus which appears acute within this distal left vertebral artery.  There is some distal reconstitution.    Distally there is good filling of the basilar artery, though there is a moderate area of stenosis in the mid to distal basilar artery.  No acute thrombi are noted intracranially in the posterior circulation.    No acute thromboembolic disease in the anterior circulation.  No significant carotid atherosclerosis is evident.    Discussed with Dr. Soto 19:05 07/27/2020.    All CT scans at this facility are performed  using dose modulation techniques as appropriate to performed exam including the following:  automated exposure control; adjustment of mA and/or kV according to the patients size (this includes techniques or standardized protocols for targeted exams where dose is matched to indication/reason for exam: i.e. extremities or head);  iterative reconstruction technique.      Electronically signed by: Chinmay Love MD  Date:    07/27/2020  Time:    19:06             Narrative:    EXAMINATION:  CTA HEAD AND NECK (XPD)    CLINICAL HISTORY:  Ataxia, stroke suspected;    TECHNIQUE:  Non contrast low dose axial images were obtained through the head.  CT angiogram was performed from the level of the rhona to the top of the head following the IV administration of intravenous contrast.  Sagittal and coronal reconstructions and maximum intensity  projection reconstructions were performed. Arterial stenosis percentages are based on NASCET measurement criteria.    COMPARISON:  None    FINDINGS:  CT angiogram neck:    Mild to moderate atherosclerosis transverse arch.  Some tortuosity proximal right common carotid artery.  Mild scattered atherosclerosis carotid arteries.  Minimal calcified plaque around the left carotid bifurcation.  Negative for hemodynamically significant stenosis.  Bilateral vertebral arteries are patent without significant atherosclerosis or stenosis proximally.  Some atherosclerosis at the distal left vertebral artery which appears occluded for a short segment with distal reconstitution.  Some calcified plaque present.  There does appear to be a small amount of thrombus present within the distal left vertebral artery (series 2, images 348 through 390).  There is a short segment of narrowing involving the distal basilar artery of mild-to-moderate severity.    CT angiogram head:    Some calcified plaque at the skull base vasculature more notably involving the carotid arteries.  No acute thrombi are noted.  No significant stenoses evident.  No significant stenoses anterior circulation.                               X-Ray Chest AP Portable (Final result)  Result time 07/27/20 16:38:38    Final result by Farhan Harman Jr., MD (07/27/20 16:38:38)                 Impression:      Left lower lobe opacity could relate to consolidation and pneumonia.      Electronically signed by: Farhan Harman Jr., MD  Date:    07/27/2020  Time:    16:38             Narrative:    EXAMINATION:  XR CHEST AP PORTABLE    CLINICAL HISTORY:  Stroke;    COMPARISON:  Prior radiograph from March 7, 2018.    FINDINGS:  Left lower lobe atelectasis or consolidation at obscures portions of the left hemidiaphragm and results in retrocardiac opacity.  No pneumothorax.  Heart size within normal limits.  Again demonstrated is a tortuous aorta.  No significant bony findings.                                CT Head Without Contrast (Final result)  Result time 07/27/20 16:27:58    Final result by Farhan Harman Jr., MD (07/27/20 16:27:58)                 Impression:      1. No acute findings.  2. Chronic lacunar infarct anterior limb of right internal capsule.  3. Mild to moderate changes of chronic microvascular ischemia involving the bifrontal lobe white matter.  All CT scans at this facility use dose modulation, iterative reconstruction, and/or weight base dosing when appropriate to reduce radiation dose to as low as reasonably achievable.      Electronically signed by: Farhan Harman Jr., MD  Date:    07/27/2020  Time:    16:27             Narrative:    EXAMINATION:  CT HEAD WITHOUT CONTRAST    CLINICAL HISTORY:  Ataxia, stroke suspected;Demyelinating disease suspected (Ped 0-18y);    TECHNIQUE:  Contiguous axial CT images were obtained from the skull base through the vertex without intravenous contrast.    COMPARISON:  None    FINDINGS:  No intracranial hemorrhage. No mass effect or midline shift. Chronic lacunar infarct within the anterior limb of the right internal capsule.  Mild to moderate degree of patchy low-density within the bifrontal lobe white matter bilaterally.  The ventricles and sulci are normal in size and configuration. The paranasal sinuses and mastoid air cells are clear.  No concerning osseous findings.                                 The EKG was ordered, reviewed, and independently interpreted by the ED provider.  Interpretation time: 1628  Rate: 83 BPM  Rhythm: sinus rhythm with frequent premature ventriuclar complexes  Interpretation: Minimal voltage criteria for LVH, may be normal variant. Nonspecific T wave abnormality. No STEMI.           The Emergency Provider reviewed the vital signs and test results, which are outlined above.     ED Discussion     4:33 PM: Discussed pt's case with Dr. Acosta ( vascular neurology) who recommends pt is out of the window for TPA because  of time of onset. Recommends aspirin, Plavix, and a stroke workup.    6:12 PM: Discussed case with JAMES Obrien ([facility]). Dr. Mckeon agrees with current care and management of pt and accepts admission.   Admitting Service: [facility]  Admitting Physician: Dr. Mckeon  Admit to: Obs Tele    6:16 PM: Re-evaluated pt. I have discussed test results, shared treatment plan, and the need for admission with patient and family at bedside. Pt and family express understanding at this time and agree with all information. All questions answered. Pt and family have no further questions or concerns at this time. Pt is ready for admit.       Medical Decision Making:   Clinical Tests:   Lab Tests: Ordered and Reviewed  Radiological Study: Ordered and Reviewed  Medical Tests: Ordered and Reviewed           ED Medication(s):  Medications   atorvastatin tablet 40 mg (has no administration in time range)   aspirin tablet 325 mg (325 mg Oral Given 7/27/20 1629)   clopidogreL tablet 75 mg (75 mg Oral Given 7/27/20 1629)   iohexoL (OMNIPAQUE 350) injection 100 mL (100 mLs Intravenous Given 7/27/20 1836)       New Prescriptions    No medications on file             Scribe Attestation:   Scribe #1: I performed the above scribed service and the documentation accurately describes the services I performed. I attest to the accuracy of the note.     Attending:   Physician Attestation Statement for Scribe #1: I, Moni Soto MD, personally performed the services described in this documentation, as scribed by Laquita Harman, in my presence, and it is both accurate and complete.           Clinical Impression       ICD-10-CM ICD-9-CM   1. Dizziness  R42 780.4   2. Stroke  I63.9 434.91   3. Ataxia  R27.0 781.3   4. Left-sided weakness  R53.1 728.87   5. Chronic hypertension  I10 401.9       Disposition:   Disposition: Placed in Observation  Condition: Fair         Moni Soto MD  07/27/20 8305

## 2020-07-27 NOTE — CONSULTS
[facility]  Vascular Neurology  Comprehensive Stroke Center  Tele-Consultation Note      Inpatient consult to Telemedicine-Stroke  Consult performed by: Angelo Acosta MD  Consult ordered by: Moni Soto MD          Consulting Provider: MONI SHIELDS  Current Providers  No providers found    Patient Location:  [facility] EMERGENCY DEPARTMENT Emergency Department  Spoke hospital nurse at bedside with patient assisting consultant.     Patient information was obtained from patient.         Assessment/Plan:     STROKE DOCUMENTATION     Acute Stroke Times:   Acute Stroke Times   Last Known Normal Time: 1000  Stroke Team Called Time: 1618  Stroke Team Arrival Time: 1620  CT Interpretation Time: 1621    NIH Scale:  1a. Level of Consciousness: 0-->Alert, keenly responsive  1b. LOC Questions: 0-->Answers both questions correctly  1c. LOC Commands: 0-->Performs both tasks correctly  2. Best Gaze: 0-->Normal  3. Visual: 0-->No visual loss  4. Facial Palsy: 0-->Normal symmetrical movements  5a. Motor Arm, Left: 0-->No drift, limb holds 90 (or 45) degrees for full 10 secs  5b. Motor Arm, Right: 0-->No drift, limb holds 90 (or 45) degrees for full 10 secs  6a. Motor Leg, Left: 0-->No drift, leg holds 30 degree position for full 5 secs  6b. Motor Leg, Right: 0-->No drift, leg holds 30 degree position for full 5 secs  7. Limb Ataxia: 0-->Absent  8. Sensory: 1-->Mild-to-moderate sensory loss, patient feels pinprick is less sharp or is dull on the affected side, or there is a loss of superficial pain with pinprick, but patient is aware of being touched  9. Best Language: 0-->No aphasia, normal  10. Dysarthria: 0-->Normal  11. Extinction and Inattention (formerly Neglect): 0-->No abnormality  Total (NIH Stroke Scale): 1     Modified [place] Coma Scale:    ABCD2 Score:    [id number] Score:   HAS -BLED Score:   ICH Score:   Hunt & Mahan Classification:       Diagnoses:   Acute  "ischemic stroke  Mild left sensory loss objectively, subjective weakness on left side. Likely mild subacute stroke.    Antithrombotics for secondary stroke prevention: Antiplatelets: Aspirin: 81 mg daily  Clopidogrel: 300 mg loading dose x 1, now  Clopidogrel: 75 mg daily    Statins for secondary stroke prevention and hyperlipidemia, if present:   Statins: Atorvastatin- 40 mg daily    Aggressive risk factor modification: None     Rehab efforts: The patient has been evaluated by a stroke team provider and the therapy needs have been fully considered based off the presenting complaints and exam findings. The following therapy evaluations are needed: PT evaluate and treat, OT evaluate and treat, SLP evaluate and treat    Diagnostics ordered/pending: CTA Head to assess vasculature , CTA Neck/Arch to assess vasculature, HgbA1C to assess blood glucose levels, Lipid Profile to assess cholesterol levels, MRI head without contrast to assess brain parenchyma, TTE to assess cardiac function/status     VTE prophylaxis: Enoxaparin 40 mg SQ every 24 hours    BP parameters: Infarct: No intervention, SBP <220            Blood pressure (!) 186/90, pulse 91, temperature 98.1 °F (36.7 °C), temperature source Oral, resp. rate 16, height 6' 2" (1.88 m), SpO2 98 %.  Alteplase Eligible?: No  Alteplase Recommendation: Alteplase not recommended due to Outside of treatment window  and Mild Non-Disabling Symptoms  Possible Interventional Revascularization Candidate? No; No significant neurological deficit    Disposition Recommendation: admit to inpatient    Subjective:     History of Present Illness:  86M w/ left sided weakness and numbness since 10 am. The aforementioned symptoms have never happened before. There are no identified triggers or modifying factors. There have been no recurrent events. There are no other associated symptoms.          Woke up with symptoms?: no    Recent bleeding noted: no  Does the patient take any Blood " "Thinners? 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   HENT: Negative for congestion and sore throat.    Eyes: Negative for discharge and itching.   Respiratory: Negative for apnea and shortness of breath.    Cardiovascular: Negative for chest pain and palpitations.   Gastrointestinal: Negative for abdominal pain and anal bleeding.   Endocrine: Negative for cold intolerance and polydipsia.   Genitourinary: Negative for dysuria and hematuria.   Musculoskeletal: Negative for joint swelling and myalgias.   Skin: Negative for color change and rash.   Neurological: Negative for tremors.   Psychiatric/Behavioral: Negative for hallucinations and self-injury.     Objective:   Vitals: Blood pressure (!) 186/90, pulse 91, temperature 98.1 °F (36.7 °C), temperature source Oral, resp. rate 16, height 6' 2" (1.88 m), SpO2 98 %.     CT READ: No . Not available.    Physical Exam  Constitutional:       General: He is not in acute distress.  HENT:      Head: Atraumatic.      Right Ear: External ear normal.      Left Ear: External ear normal.   Eyes:      General: No scleral icterus.     Conjunctiva/sclera: Conjunctivae normal.   Neck:      Musculoskeletal: Normal range of motion.   Pulmonary:      Effort: Pulmonary effort is normal.   Abdominal:      General: There is no distension.      Tenderness: There is no guarding.   Musculoskeletal: Normal range of motion.         General: No deformity.   Skin:     General: Skin is warm and dry.   Neurological:      " Mental Status: He is alert.               Recommended the emergency room physician to have a brief discussion with the patient and/or family if available regarding the risks and benefits of treatment, and to briefly document the occurrence of that discussion in his clinical encounter note.     The attending portion of this evaluation, treatment, and documentation was performed per Angelo Acosta MD via audiovisual.    Billing code:  (non-intervention mild to moderate stroke, TIA, some mimics)    · This patient has a neurological condition/illness, with some potential for high morbidity and mortality.  · There is a moderate probability for acute neurological change leading to clinical and possibly life-threatening deterioration requiring highest level of physician preparedness for urgent intervention.  · Care was coordinated with other physicians involved in the patient's care.  · Radiologic studies and laboratory data were reviewed and interpreted, and plan of care was re-assessed based on the results.  · Diagnosis, treatment options and prognosis may have been discussed with the patient and/or family members or caregiver.      In your opinion, this was a: Tier 2 Van Negative    Consult End Time: 4:27 PM     Angelo Acosta MD  [facility]  Vascular Neurology   [facility]

## 2020-07-27 NOTE — ASSESSMENT & PLAN NOTE
Mild left sensory loss objectively, subjective weakness on left side. Likely mild subacute stroke.    Antithrombotics for secondary stroke prevention: Antiplatelets: Aspirin: 81 mg daily  Clopidogrel: 300 mg loading dose x 1, now  Clopidogrel: 75 mg daily    Statins for secondary stroke prevention and hyperlipidemia, if present:   Statins: Atorvastatin- 40 mg daily    Aggressive risk factor modification: None     Rehab efforts: The patient has been evaluated by a stroke team provider and the therapy needs have been fully considered based off the presenting complaints and exam findings. The following therapy evaluations are needed: PT evaluate and treat, OT evaluate and treat, SLP evaluate and treat    Diagnostics ordered/pending: CTA Head to assess vasculature , CTA Neck/Arch to assess vasculature, HgbA1C to assess blood glucose levels, Lipid Profile to assess cholesterol levels, MRI head without contrast to assess brain parenchyma, TTE to assess cardiac function/status     VTE prophylaxis: Enoxaparin 40 mg SQ every 24 hours    BP parameters: Infarct: No intervention, SBP <220

## 2020-07-27 NOTE — HPI
86M w/ left sided weakness and numbness since 10 am. The aforementioned symptoms have never happened before. There are no identified triggers or modifying factors. There have been no recurrent events. There are no other associated symptoms.

## 2020-07-28 PROBLEM — I63.012 CEREBROVASCULAR ACCIDENT (CVA) DUE TO THROMBOSIS OF LEFT VERTEBRAL ARTERY: Status: ACTIVE | Noted: 2020-07-28

## 2020-07-28 LAB
ALBUMIN SERPL BCP-MCNC: 4.4 G/DL (ref 3.5–5.2)
ALP SERPL-CCNC: 93 U/L (ref 55–135)
ALT SERPL W/O P-5'-P-CCNC: 16 U/L (ref 10–44)
ANION GAP SERPL CALC-SCNC: 14 MMOL/L (ref 8–16)
AORTIC ROOT ANNULUS: 3.58 CM
APTT BLDCRRT: 32.2 SEC (ref 21–32)
ASCENDING AORTA: 2.93 CM
AST SERPL-CCNC: 29 U/L (ref 10–40)
AV INDEX (PROSTH): 0.62
AV MEAN GRADIENT: 10 MMHG
AV PEAK GRADIENT: 17 MMHG
AV VALVE AREA: 1.98 CM2
AV VELOCITY RATIO: 0.6
BASOPHILS # BLD AUTO: 0.01 K/UL (ref 0–0.2)
BASOPHILS NFR BLD: 0.1 % (ref 0–1.9)
BILIRUB SERPL-MCNC: 1.2 MG/DL (ref 0.1–1)
BSA FOR ECHO PROCEDURE: 2.15 M2
BUN SERPL-MCNC: 11 MG/DL (ref 8–23)
CALCIUM SERPL-MCNC: 10 MG/DL (ref 8.7–10.5)
CHLORIDE SERPL-SCNC: 98 MMOL/L (ref 95–110)
CHOLEST SERPL-MCNC: 238 MG/DL (ref 120–199)
CHOLEST/HDLC SERPL: 5.4 {RATIO} (ref 2–5)
CK MB SERPL-MCNC: 8.7 NG/ML (ref 0.1–6.5)
CK MB SERPL-RTO: 1 % (ref 0–5)
CK SERPL-CCNC: 877 U/L (ref 20–200)
CO2 SERPL-SCNC: 24 MMOL/L (ref 23–29)
CREAT SERPL-MCNC: 0.9 MG/DL (ref 0.5–1.4)
CV ECHO LV RWT: 1.02 CM
DIFFERENTIAL METHOD: ABNORMAL
DOP CALC AO PEAK VEL: 2.05 M/S
DOP CALC AO VTI: 47.79 CM
DOP CALC LVOT AREA: 3.2 CM2
DOP CALC LVOT DIAMETER: 2.01 CM
DOP CALC LVOT PEAK VEL: 1.22 M/S
DOP CALC LVOT STROKE VOLUME: 94.7 CM3
DOP CALC RVOT PEAK VEL: 0.48 M/S
DOP CALC RVOT VTI: 13.09 CM
DOP CALCLVOT PEAK VEL VTI: 29.86 CM
E WAVE DECELERATION TIME: 201.84 MSEC
E/A RATIO: 0.96
E/E' RATIO: 13.71 M/S
ECHO LV POSTERIOR WALL: 1.32 CM (ref 0.6–1.1)
EOSINOPHIL # BLD AUTO: 0 K/UL (ref 0–0.5)
EOSINOPHIL NFR BLD: 0 % (ref 0–8)
ERYTHROCYTE [DISTWIDTH] IN BLOOD BY AUTOMATED COUNT: 15.1 % (ref 11.5–14.5)
EST. GFR  (AFRICAN AMERICAN): >60 ML/MIN/1.73 M^2
EST. GFR  (NON AFRICAN AMERICAN): >60 ML/MIN/1.73 M^2
ESTIMATED AVG GLUCOSE: 111 MG/DL (ref 68–131)
FRACTIONAL SHORTENING: 29 % (ref 28–44)
GLUCOSE SERPL-MCNC: 102 MG/DL (ref 70–110)
HBA1C MFR BLD HPLC: 5.5 % (ref 4–5.6)
HCT VFR BLD AUTO: 46.1 % (ref 40–54)
HDLC SERPL-MCNC: 44 MG/DL (ref 40–75)
HDLC SERPL: 18.5 % (ref 20–50)
HGB BLD-MCNC: 14.6 G/DL (ref 14–18)
IMM GRANULOCYTES # BLD AUTO: 0.04 K/UL (ref 0–0.04)
IMM GRANULOCYTES NFR BLD AUTO: 0.4 % (ref 0–0.5)
INR PPP: 1 (ref 0.8–1.2)
INTERVENTRICULAR SEPTUM: 1.51 CM (ref 0.6–1.1)
IVRT: 85.63 MSEC
LA MAJOR: 4.28 CM
LA MINOR: 3.26 CM
LA WIDTH: 4.5 CM
LDLC SERPL CALC-MCNC: 165.8 MG/DL (ref 63–159)
LEFT ATRIUM SIZE: 3.29 CM
LEFT ATRIUM VOLUME INDEX: 21.7 ML/M2
LEFT ATRIUM VOLUME: 46.57 CM3
LEFT INTERNAL DIMENSION IN SYSTOLE: 1.84 CM (ref 2.1–4)
LEFT VENTRICLE DIASTOLIC VOLUME INDEX: 11.21 ML/M2
LEFT VENTRICLE DIASTOLIC VOLUME: 24.1 ML
LEFT VENTRICLE MASS INDEX: 55 G/M2
LEFT VENTRICLE SYSTOLIC VOLUME INDEX: 4.8 ML/M2
LEFT VENTRICLE SYSTOLIC VOLUME: 10.24 ML
LEFT VENTRICULAR INTERNAL DIMENSION IN DIASTOLE: 2.58 CM (ref 3.5–6)
LEFT VENTRICULAR MASS: 118.05 G
LV LATERAL E/E' RATIO: 16 M/S
LV SEPTAL E/E' RATIO: 12 M/S
LYMPHOCYTES # BLD AUTO: 2.2 K/UL (ref 1–4.8)
LYMPHOCYTES NFR BLD: 24.1 % (ref 18–48)
MAGNESIUM SERPL-MCNC: 2 MG/DL (ref 1.6–2.6)
MCH RBC QN AUTO: 26.6 PG (ref 27–31)
MCHC RBC AUTO-ENTMCNC: 31.7 G/DL (ref 32–36)
MCV RBC AUTO: 84 FL (ref 82–98)
MONOCYTES # BLD AUTO: 0.8 K/UL (ref 0.3–1)
MONOCYTES NFR BLD: 8.3 % (ref 4–15)
MV PEAK A VEL: 1 M/S
MV PEAK E VEL: 0.96 M/S
MV STENOSIS PRESSURE HALF TIME: 58.53 MS
MV VALVE AREA P 1/2 METHOD: 3.76 CM2
NEUTROPHILS # BLD AUTO: 6.1 K/UL (ref 1.8–7.7)
NEUTROPHILS NFR BLD: 67.1 % (ref 38–73)
NONHDLC SERPL-MCNC: 194 MG/DL
NRBC BLD-RTO: 0 /100 WBC
PHOSPHATE SERPL-MCNC: 2.6 MG/DL (ref 2.7–4.5)
PISA TR MAX VEL: 2.65 M/S
PLATELET # BLD AUTO: 279 K/UL (ref 150–350)
PMV BLD AUTO: 10.7 FL (ref 9.2–12.9)
POCT GLUCOSE: 139 MG/DL (ref 70–110)
POTASSIUM SERPL-SCNC: 4.1 MMOL/L (ref 3.5–5.1)
PROT SERPL-MCNC: 8.4 G/DL (ref 6–8.4)
PROTHROMBIN TIME: 10.9 SEC (ref 9–12.5)
PV MEAN GRADIENT: 0.63 MMHG
RA MAJOR: 3.68 CM
RA PRESSURE: 3 MMHG
RBC # BLD AUTO: 5.48 M/UL (ref 4.6–6.2)
SINUS: 3.18 CM
SODIUM SERPL-SCNC: 136 MMOL/L (ref 136–145)
STJ: 2.99 CM
TDI LATERAL: 0.06 M/S
TDI SEPTAL: 0.08 M/S
TDI: 0.07 M/S
TR MAX PG: 28 MMHG
TRICUSPID ANNULAR PLANE SYSTOLIC EXCURSION: 1.47 CM
TRIGL SERPL-MCNC: 141 MG/DL (ref 30–150)
TROPONIN I SERPL DL<=0.01 NG/ML-MCNC: 0.04 NG/ML (ref 0–0.03)
TV REST PULMONARY ARTERY PRESSURE: 31 MMHG
WBC # BLD AUTO: 9.05 K/UL (ref 3.9–12.7)

## 2020-07-28 PROCEDURE — 97116 GAIT TRAINING THERAPY: CPT | Mod: HCNC

## 2020-07-28 PROCEDURE — 83735 ASSAY OF MAGNESIUM: CPT | Mod: HCNC

## 2020-07-28 PROCEDURE — 36415 COLL VENOUS BLD VENIPUNCTURE: CPT | Mod: HCNC

## 2020-07-28 PROCEDURE — 99215 PR OFFICE/OUTPT VISIT, EST, LEVL V, 40-54 MIN: ICD-10-PCS | Mod: ,,, | Performed by: OPHTHALMOLOGY

## 2020-07-28 PROCEDURE — 96372 THER/PROPH/DIAG INJ SC/IM: CPT | Mod: 59 | Performed by: EMERGENCY MEDICINE

## 2020-07-28 PROCEDURE — 84100 ASSAY OF PHOSPHORUS: CPT | Mod: HCNC

## 2020-07-28 PROCEDURE — 82550 ASSAY OF CK (CPK): CPT | Mod: HCNC

## 2020-07-28 PROCEDURE — G0378 HOSPITAL OBSERVATION PER HR: HCPCS | Mod: HCNC

## 2020-07-28 PROCEDURE — 92523 SPEECH SOUND LANG COMPREHEN: CPT | Mod: HCNC

## 2020-07-28 PROCEDURE — 25000003 PHARM REV CODE 250: Mod: HCNC | Performed by: NURSE PRACTITIONER

## 2020-07-28 PROCEDURE — 21400001 HC TELEMETRY ROOM: Mod: HCNC

## 2020-07-28 PROCEDURE — 97530 THERAPEUTIC ACTIVITIES: CPT | Mod: HCNC | Performed by: PHYSICAL THERAPIST

## 2020-07-28 PROCEDURE — 80053 COMPREHEN METABOLIC PANEL: CPT | Mod: HCNC

## 2020-07-28 PROCEDURE — 92610 EVALUATE SWALLOWING FUNCTION: CPT | Mod: HCNC

## 2020-07-28 PROCEDURE — 96374 THER/PROPH/DIAG INJ IV PUSH: CPT | Performed by: EMERGENCY MEDICINE

## 2020-07-28 PROCEDURE — 63600175 PHARM REV CODE 636 W HCPCS: Mod: HCNC | Performed by: NURSE PRACTITIONER

## 2020-07-28 PROCEDURE — 85730 THROMBOPLASTIN TIME PARTIAL: CPT | Mod: HCNC

## 2020-07-28 PROCEDURE — 25000003 PHARM REV CODE 250: Mod: HCNC | Performed by: HOSPITALIST

## 2020-07-28 PROCEDURE — 97165 OT EVAL LOW COMPLEX 30 MIN: CPT | Mod: HCNC | Performed by: PHYSICAL THERAPIST

## 2020-07-28 PROCEDURE — 97802 MEDICAL NUTRITION INDIV IN: CPT | Mod: HCNC

## 2020-07-28 PROCEDURE — 82553 CREATINE MB FRACTION: CPT | Mod: HCNC

## 2020-07-28 PROCEDURE — 84484 ASSAY OF TROPONIN QUANT: CPT | Mod: HCNC

## 2020-07-28 PROCEDURE — 97162 PT EVAL MOD COMPLEX 30 MIN: CPT | Mod: HCNC

## 2020-07-28 PROCEDURE — 99215 OFFICE O/P EST HI 40 MIN: CPT | Mod: ,,, | Performed by: OPHTHALMOLOGY

## 2020-07-28 PROCEDURE — 96372 THER/PROPH/DIAG INJ SC/IM: CPT | Mod: HCNC

## 2020-07-28 PROCEDURE — 85025 COMPLETE CBC W/AUTO DIFF WBC: CPT | Mod: HCNC

## 2020-07-28 PROCEDURE — 85610 PROTHROMBIN TIME: CPT | Mod: HCNC

## 2020-07-28 RX ORDER — AMLODIPINE BESYLATE 5 MG/1
5 TABLET ORAL 2 TIMES DAILY
Status: DISCONTINUED | OUTPATIENT
Start: 2020-07-28 | End: 2020-07-30 | Stop reason: HOSPADM

## 2020-07-28 RX ORDER — LOSARTAN POTASSIUM 50 MG/1
100 TABLET ORAL DAILY
Status: DISCONTINUED | OUTPATIENT
Start: 2020-07-28 | End: 2020-07-30 | Stop reason: HOSPADM

## 2020-07-28 RX ORDER — HYDROCHLOROTHIAZIDE 25 MG/1
25 TABLET ORAL DAILY
Status: DISCONTINUED | OUTPATIENT
Start: 2020-07-28 | End: 2020-07-30 | Stop reason: HOSPADM

## 2020-07-28 RX ORDER — HYDRALAZINE HYDROCHLORIDE 50 MG/1
50 TABLET, FILM COATED ORAL EVERY 12 HOURS
Status: DISCONTINUED | OUTPATIENT
Start: 2020-07-28 | End: 2020-07-28

## 2020-07-28 RX ADMIN — LOSARTAN POTASSIUM 100 MG: 50 TABLET, FILM COATED ORAL at 08:07

## 2020-07-28 RX ADMIN — PANTOPRAZOLE SODIUM 40 MG: 40 TABLET, DELAYED RELEASE ORAL at 08:07

## 2020-07-28 RX ADMIN — AMLODIPINE BESYLATE 5 MG: 5 TABLET ORAL at 08:07

## 2020-07-28 RX ADMIN — AMLODIPINE BESYLATE 5 MG: 5 TABLET ORAL at 12:07

## 2020-07-28 RX ADMIN — HYDROCHLOROTHIAZIDE 25 MG: 25 TABLET ORAL at 01:07

## 2020-07-28 RX ADMIN — HYDRALAZINE HYDROCHLORIDE 50 MG: 50 TABLET, FILM COATED ORAL at 12:07

## 2020-07-28 RX ADMIN — DONEPEZIL HYDROCHLORIDE 10 MG: 5 TABLET, FILM COATED ORAL at 09:07

## 2020-07-28 RX ADMIN — CLOPIDOGREL 75 MG: 75 TABLET, FILM COATED ORAL at 08:07

## 2020-07-28 RX ADMIN — LOSARTAN POTASSIUM 100 MG: 50 TABLET, FILM COATED ORAL at 12:07

## 2020-07-28 RX ADMIN — HYDRALAZINE HYDROCHLORIDE 50 MG: 50 TABLET, FILM COATED ORAL at 08:07

## 2020-07-28 RX ADMIN — ENOXAPARIN SODIUM 40 MG: 40 INJECTION SUBCUTANEOUS at 04:07

## 2020-07-28 RX ADMIN — AMLODIPINE BESYLATE 5 MG: 5 TABLET ORAL at 09:07

## 2020-07-28 RX ADMIN — ATORVASTATIN CALCIUM 40 MG: 40 TABLET, FILM COATED ORAL at 09:07

## 2020-07-28 RX ADMIN — ASPIRIN 81 MG: 81 TABLET, COATED ORAL at 08:07

## 2020-07-28 RX ADMIN — LABETALOL HYDROCHLORIDE 10 MG: 5 INJECTION, SOLUTION INTRAVENOUS at 04:07

## 2020-07-28 NOTE — H&P
"[facility] Hospital Medicine  History & Physical    Patient Name: Chinmay Mei  MRN: [id number]  Admission Date: 7/27/2020  Attending Physician: Luis E Mckeon MD   Primary Care Provider: Luis E Leach MD         Patient information was obtained from patient, past medical records and ER records.     Subjective:     Principal Problem:Cerebrovascular accident (CVA) due to thrombosis of left vertebral artery    Chief Complaint:   Chief Complaint   Patient presents with    Fatigue     weakness since 10 am this morning, able to move all ext. leaning to left side.        HPI: Chinmay Mei is a 86 y.o. male patient with a PMHx of arthritis, HLD, and HTN who presented to the Emergency Department with c/o dizziness which onset gradually about 6 hours PTA. No mitigating or exacerbating factors reported. He describes the dizziness as "feeling off balance". Associated mild LLE weakness. He reports leaning to the left side when trying to ambulate causing him to fall multiple times PTA. Patient denies any HA, visual disturbance, syncope, confusion, CP, SOB, ABD pain, N/V/D, back pain, neck pain, facial droop, slurred speech, fever or chills. CT of the head showed no acute findings, chronic lacunar infarct anterior limb of right internal capsule, and mild to moderate changes of chronic microvascular ischemia involving the bifrontal lobe white matter. Vascular Neurology consulted in the ED via TeleStroke, and recommended further imaging to r/o occlusion needing intervention. CTA head and neck showed a short segment occlusion involving the distal left vertebral artery with some calcified plaque present suggesting underlying stenosis, however, there is apparent short segment of thrombus which appears acute within this distal left vertebral artery with some distal reconstitution; distally there is good filling of the basilar artery, though there is a moderate area of stenosis in the mid to distal " basilar artery; no acute thrombi are noted intracranially in the posterior circulation; no acute thromboembolic disease in the anterior circulation; no significant carotid atherosclerosis is evident. Vascular Neurology reviewed CTA results and felt findings appear chronic. Patient received 325 mg ASA and 75 mg Plavix in the ED. Hospital Medicine was contacted for admission.       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      Current Outpatient Medications on File Prior to Encounter   Medication Sig    acetaminophen (TYLENOL) 325 MG tablet Take 325 mg by mouth as needed.    amLODIPine (NORVASC) 5 MG tablet Take 1 tablet (5 mg total) by mouth 2 (two) times daily.    hydrALAZINE (APRESOLINE) 50 MG tablet Take 1 tablet (50 mg total) by mouth every 12 (twelve) hours.    losartan (COZAAR) 100 MG tablet Take 1 tablet (100 mg total) by mouth once daily.    donepezil (ARICEPT) 10 MG tablet Take 1 tablet (10 mg total) by mouth every evening.    hydroCHLOROthiazide (HYDRODIURIL) 12.5 MG Tab Take 1 tablet (12.5 mg total) by mouth every 48 hours. for 3 doses     Family History     Problem Relation (Age of Onset)    Diabetes Father, Sister, Brother, Son    Glaucoma Sister    Heart disease Mother, Sister, Brother    Hypertension Mother, Sister, Brother, Son    Kidney disease Daughter    Prostate cancer Father    Stomach cancer Daughter    Stroke Brother    Ulcers Sister        Tobacco Use    Smoking status:  Former Smoker     Quit date: 2006     Years since quittin.0    Smokeless tobacco: Never Used   Substance and Sexual Activity    Alcohol use: No     Frequency: Never     Binge frequency: Never    Drug use: No    Sexual activity: Not on file     Review of Systems   Constitutional: Negative for chills, diaphoresis, fatigue and fever.   HENT: Negative for congestion, postnasal drip, rhinorrhea, sinus pressure and sore throat.    Eyes: Negative for visual disturbance.   Respiratory: Negative for cough, shortness of breath and wheezing.    Cardiovascular: Negative for chest pain, palpitations and leg swelling.   Gastrointestinal: Negative for abdominal pain, diarrhea, nausea and vomiting.   Genitourinary: Negative for dysuria and hematuria.   Musculoskeletal: Positive for gait problem. Negative for arthralgias, back pain, myalgias, neck pain and neck stiffness.   Skin: Negative for pallor and rash.   Neurological: Positive for dizziness. Negative for syncope, facial asymmetry, speech difficulty, weakness, numbness and headaches.   Psychiatric/Behavioral: Negative for confusion.   All other systems reviewed and are negative.    Objective:     Vital Signs (Most Recent):  Temp: 98.1 °F (36.7 °C) (20 1551)  Pulse: 71 (20 181)  Resp: 15 (20)  BP: (!) 179/95 (20)  SpO2: 99 % (20) Vital Signs (24h Range):  Temp:  [98.1 °F (36.7 °C)] 98.1 °F (36.7 °C)  Pulse:  [71-91] 71  Resp:  [15-20] 15  SpO2:  [97 %-99 %] 99 %  BP: (163-189)/(84-95) 179/95        Body mass index is 26.52 kg/m².    Physical Exam  Vitals signs and nursing note reviewed.   Constitutional:       General: He is awake. He is not in acute distress.     Appearance: Normal appearance. He is well-developed. He is not diaphoretic.   HENT:      Head: Normocephalic and atraumatic.   Eyes:      Conjunctiva/sclera: Conjunctivae normal.   Neck:      Musculoskeletal: Normal range of motion and neck supple.       Vascular: Normal carotid pulses. No carotid bruit.   Cardiovascular:      Rate and Rhythm: Normal rate and regular rhythm.      Heart sounds: S1 normal and S2 normal. No murmur.   Pulmonary:      Effort: Pulmonary effort is normal. No tachypnea.      Breath sounds: Normal breath sounds and air entry. No wheezing, rhonchi or rales.   Abdominal:      General: Bowel sounds are normal. There is no distension.      Palpations: Abdomen is soft.      Tenderness: There is no abdominal tenderness.   Musculoskeletal: Normal range of motion.   Skin:     General: Skin is warm and dry.      Findings: No erythema or rash.   Neurological:      General: No focal deficit present.      Mental Status: He is alert and oriented to person, place, and time.      GCS: GCS eye subscore is 4. GCS verbal subscore is 5. GCS motor subscore is 6.      Cranial Nerves: Cranial nerves are intact.      Sensory: Sensation is intact.      Motor: Motor function is intact.      Coordination: Coordination is intact.      Comments: Only mild weakness to LLE.   Psychiatric:         Mood and Affect: Mood and affect normal.         Speech: Speech normal.         Behavior: Behavior normal. Behavior is cooperative.         Cognition and Memory: Memory normal.             Significant Labs:  Results for orders placed or performed during the hospital encounter of 07/27/20   CBC W/ AUTO DIFFERENTIAL   Result Value Ref Range    WBC 10.00 3.90 - 12.70 K/uL    RBC 5.29 4.60 - 6.20 M/uL    Hemoglobin 14.2 14.0 - 18.0 g/dL    Hematocrit 44.8 40.0 - 54.0 %    Mean Corpuscular Volume 85 82 - 98 fL    Mean Corpuscular Hemoglobin 26.8 (L) 27.0 - 31.0 pg    Mean Corpuscular Hemoglobin Conc 31.7 (L) 32.0 - 36.0 g/dL    RDW 15.4 (H) 11.5 - 14.5 %    Platelets 268 150 - 350 K/uL    MPV 10.6 9.2 - 12.9 fL    Immature Granulocytes 0.4 0.0 - 0.5 %    Gran # (ANC) 8.6 (H) 1.8 - 7.7 K/uL    Immature Grans (Abs) 0.04 0.00 - 0.04 K/uL    Lymph # 0.9 (L) 1.0 - 4.8 K/uL    Mono # 0.5  0.3 - 1.0 K/uL    Eos # 0.0 0.0 - 0.5 K/uL    Baso # 0.01 0.00 - 0.20 K/uL    nRBC 0 0 /100 WBC    Gran% 86.1 (H) 38.0 - 73.0 %    Lymph% 8.7 (L) 18.0 - 48.0 %    Mono% 4.7 4.0 - 15.0 %    Eosinophil% 0.0 0.0 - 8.0 %    Basophil% 0.1 0.0 - 1.9 %    Differential Method Automated    Comprehensive metabolic panel   Result Value Ref Range    Sodium 136 136 - 145 mmol/L    Potassium 4.0 3.5 - 5.1 mmol/L    Chloride 99 95 - 110 mmol/L    CO2 22 (L) 23 - 29 mmol/L    Glucose 143 (H) 70 - 110 mg/dL    BUN, Bld 12 8 - 23 mg/dL    Creatinine 1.0 0.5 - 1.4 mg/dL    Calcium 9.7 8.7 - 10.5 mg/dL    Total Protein 8.8 (H) 6.0 - 8.4 g/dL    Albumin 4.6 3.5 - 5.2 g/dL    Total Bilirubin 0.8 0.1 - 1.0 mg/dL    Alkaline Phosphatase 88 55 - 135 U/L    AST 25 10 - 40 U/L    ALT 17 10 - 44 U/L    Anion Gap 15 8 - 16 mmol/L    eGFR if African American >60 >60 mL/min/1.73 m^2    eGFR if non African American >60 >60 mL/min/1.73 m^2   Protime-INR   Result Value Ref Range    Prothrombin Time 10.6 9.0 - 12.5 sec    INR 1.0 0.8 - 1.2   TSH   Result Value Ref Range    TSH 0.454 0.400 - 4.000 uIU/mL   Urinalysis, Reflex to Urine Culture Urine, Clean Catch    Specimen: Urine   Result Value Ref Range    Specimen UA Urine, Clean Catch     Color, UA Yellow Yellow, Straw, Latasha    Appearance, UA Clear Clear    pH, UA 8.0 5.0 - 8.0    Specific Gravity, UA 1.020 1.005 - 1.030    Protein, UA Negative Negative    Glucose, UA 1+ (A) Negative    Ketones, UA Negative Negative    Bilirubin (UA) Negative Negative    Occult Blood UA Trace (A) Negative    Nitrite, UA Negative Negative    Urobilinogen, UA Negative <2.0 EU/dL    Leukocytes, UA Negative Negative   COVID-19 Rapid Screening   Result Value Ref Range    SARS-CoV-2 RNA, Amplification, Qual Negative Negative   POCT glucose   Result Value Ref Range    POCT Glucose 153 (H) 70 - 110 mg/dL      All pertinent labs within the past 24 hours have been reviewed.    Significant Imaging:  Imaging Results           CTA Head and Neck (xpd) (Final result)  Result time 07/27/20 19:06:56    Final result by Chinmay Love MD (07/27/20 19:06:56)                 Impression:      There is a short segment occlusion involving the distal left vertebral artery with some calcified plaque present suggesting underlying stenosis.  However there is apparent short segment of thrombus which appears acute within this distal left vertebral artery.  There is some distal reconstitution.    Distally there is good filling of the basilar artery, though there is a moderate area of stenosis in the mid to distal basilar artery.  No acute thrombi are noted intracranially in the posterior circulation.    No acute thromboembolic disease in the anterior circulation.  No significant carotid atherosclerosis is evident.    Discussed with Dr. Soto 19:05 07/27/2020.    All CT scans at this facility are performed  using dose modulation techniques as appropriate to performed exam including the following:  automated exposure control; adjustment of mA and/or kV according to the patients size (this includes techniques or standardized protocols for targeted exams where dose is matched to indication/reason for exam: i.e. extremities or head);  iterative reconstruction technique.      Electronically signed by: Chinmay Love MD  Date:    07/27/2020  Time:    19:06             Narrative:    EXAMINATION:  CTA HEAD AND NECK (XPD)    CLINICAL HISTORY:  Ataxia, stroke suspected;    TECHNIQUE:  Non contrast low dose axial images were obtained through the head.  CT angiogram was performed from the level of the rhona to the top of the head following the IV administration of intravenous contrast.  Sagittal and coronal reconstructions and maximum intensity projection reconstructions were performed. Arterial stenosis percentages are based on NASCET measurement criteria.    COMPARISON:  None    FINDINGS:  CT angiogram neck:    Mild to moderate atherosclerosis transverse arch.   Some tortuosity proximal right common carotid artery.  Mild scattered atherosclerosis carotid arteries.  Minimal calcified plaque around the left carotid bifurcation.  Negative for hemodynamically significant stenosis.  Bilateral vertebral arteries are patent without significant atherosclerosis or stenosis proximally.  Some atherosclerosis at the distal left vertebral artery which appears occluded for a short segment with distal reconstitution.  Some calcified plaque present.  There does appear to be a small amount of thrombus present within the distal left vertebral artery (series 2, images 348 through 390).  There is a short segment of narrowing involving the distal basilar artery of mild-to-moderate severity.    CT angiogram head:    Some calcified plaque at the skull base vasculature more notably involving the carotid arteries.  No acute thrombi are noted.  No significant stenoses evident.  No significant stenoses anterior circulation.                               X-Ray Chest AP Portable (Final result)  Result time 07/27/20 16:38:38    Final result by Farhan Harman Jr., MD (07/27/20 16:38:38)                 Impression:      Left lower lobe opacity could relate to consolidation and pneumonia.      Electronically signed by: Farhan Harman Jr., MD  Date:    07/27/2020  Time:    16:38             Narrative:    EXAMINATION:  XR CHEST AP PORTABLE    CLINICAL HISTORY:  Stroke;    COMPARISON:  Prior radiograph from March 7, 2018.    FINDINGS:  Left lower lobe atelectasis or consolidation at obscures portions of the left hemidiaphragm and results in retrocardiac opacity.  No pneumothorax.  Heart size within normal limits.  Again demonstrated is a tortuous aorta.  No significant bony findings.                               CT Head Without Contrast (Final result)  Result time 07/27/20 16:27:58    Final result by Farhan Harman Jr., MD (07/27/20 16:27:58)                 Impression:      1. No acute findings.  2. Chronic  lacunar infarct anterior limb of right internal capsule.  3. Mild to moderate changes of chronic microvascular ischemia involving the bifrontal lobe white matter.  All CT scans at this facility use dose modulation, iterative reconstruction, and/or weight base dosing when appropriate to reduce radiation dose to as low as reasonably achievable.      Electronically signed by: Farhan Harman Jr., MD  Date:    07/27/2020  Time:    16:27             Narrative:    EXAMINATION:  CT HEAD WITHOUT CONTRAST    CLINICAL HISTORY:  Ataxia, stroke suspected;Demyelinating disease suspected (Ped 0-18y);    TECHNIQUE:  Contiguous axial CT images were obtained from the skull base through the vertex without intravenous contrast.    COMPARISON:  None    FINDINGS:  No intracranial hemorrhage. No mass effect or midline shift. Chronic lacunar infarct within the anterior limb of the right internal capsule.  Mild to moderate degree of patchy low-density within the bifrontal lobe white matter bilaterally.  The ventricles and sulci are normal in size and configuration. The paranasal sinuses and mastoid air cells are clear.  No concerning osseous findings.                               I have reviewed all pertinent imaging results/findings within the past 24 hours.     EKG: (personally reviewed)  Sinus rhythm with frequent PVCs, nonspecific T wave abnormality. No significant change from previous tracings.           Assessment/Plan:     * Cerebrovascular accident (CVA) due to thrombosis of left vertebral artery  - Vascular Neurology reviewed CTA of head/neck and felt findings were chronic.  - Will obtain MRI of brain.  - 2D echo in AM.  - Will give additional Plavix to equal 300 mg loading dose, then continue 75 mg daily.  - Patient tolerated ASA in the ED despite being documented as an allergy.  Continue 81 mg ASA daily.  - Add Lipitor 40 mg daily.  - Neuro checks.  - PT/OT/ST consult to eval and treat.  - Neurology  consult.    Hypercholesteremia  - Adding statin as above.  - Check FLP.    Essential hypertension  - Continue home amlodipine, hydralazine, and losartan.      VTE Risk Mitigation (From admission, onward)         Ordered     enoxaparin injection 40 mg  Every 24 hours      07/27/20 1932     IP VTE HIGH RISK PATIENT  Once      07/27/20 1932     Place sequential compression device  Until discontinued      07/27/20 1932                   Phoebe Falk NP  Department of Hospital Medicine   [facility]

## 2020-07-28 NOTE — PLAN OF CARE
P.T. EVAL COMPLETE, PT CURRENTLY REQUIRES [facility] FOR BED MOBILITY, [facility] FOR TF'S AND GAIT WITH RW, P.T. RECOMMENDS HHPT VS SNF DEPENDING ON PROGRESS WITH POC

## 2020-07-28 NOTE — SUBJECTIVE & OBJECTIVE
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      Current Outpatient Medications on File Prior to Encounter   Medication Sig    acetaminophen (TYLENOL) 325 MG tablet Take 325 mg by mouth as needed.    amLODIPine (NORVASC) 5 MG tablet Take 1 tablet (5 mg total) by mouth 2 (two) times daily.    hydrALAZINE (APRESOLINE) 50 MG tablet Take 1 tablet (50 mg total) by mouth every 12 (twelve) hours.    losartan (COZAAR) 100 MG tablet Take 1 tablet (100 mg total) by mouth once daily.    donepezil (ARICEPT) 10 MG tablet Take 1 tablet (10 mg total) by mouth every evening.    hydroCHLOROthiazide (HYDRODIURIL) 12.5 MG Tab Take 1 tablet (12.5 mg total) by mouth every 48 hours. for 3 doses     Family History     Problem Relation (Age of Onset)    Diabetes Father, Sister, Brother, Son    Glaucoma Sister    Heart disease Mother, Sister, Brother    Hypertension Mother, Sister, Brother, Son    Kidney disease Daughter    Prostate cancer Father    Stomach cancer Daughter    Stroke Brother    Ulcers Sister        Tobacco Use    Smoking status: Former Smoker     Quit date: 2006     Years since quittin.0    Smokeless tobacco: Never Used   Substance and Sexual Activity    Alcohol use: No     Frequency: Never     Binge frequency: Never    Drug use: No    Sexual activity: Not on file     Review of Systems   Constitutional: Negative for chills, diaphoresis, fatigue and fever.   HENT: Negative for congestion,  postnasal drip, rhinorrhea, sinus pressure and sore throat.    Eyes: Negative for visual disturbance.   Respiratory: Negative for cough, shortness of breath and wheezing.    Cardiovascular: Negative for chest pain, palpitations and leg swelling.   Gastrointestinal: Negative for abdominal pain, diarrhea, nausea and vomiting.   Genitourinary: Negative for dysuria and hematuria.   Musculoskeletal: Positive for gait problem. Negative for arthralgias, back pain, myalgias, neck pain and neck stiffness.   Skin: Negative for pallor and rash.   Neurological: Positive for dizziness. Negative for syncope, facial asymmetry, speech difficulty, weakness, numbness and headaches.   Psychiatric/Behavioral: Negative for confusion.   All other systems reviewed and are negative.    Objective:     Vital Signs (Most Recent):  Temp: 98.1 °F (36.7 °C) (07/27/20 1551)  Pulse: 71 (07/27/20 1816)  Resp: 15 (07/27/20 1816)  BP: (!) 179/95 (07/27/20 1816)  SpO2: 99 % (07/27/20 1816) Vital Signs (24h Range):  Temp:  [98.1 °F (36.7 °C)] 98.1 °F (36.7 °C)  Pulse:  [71-91] 71  Resp:  [15-20] 15  SpO2:  [97 %-99 %] 99 %  BP: (163-189)/(84-95) 179/95        Body mass index is 26.52 kg/m².    Physical Exam  Vitals signs and nursing note reviewed.   Constitutional:       General: He is awake. He is not in acute distress.     Appearance: Normal appearance. He is well-developed. He is not diaphoretic.   HENT:      Head: Normocephalic and atraumatic.   Eyes:      Conjunctiva/sclera: Conjunctivae normal.   Neck:      Musculoskeletal: Normal range of motion and neck supple.      Vascular: Normal carotid pulses. No carotid bruit.   Cardiovascular:      Rate and Rhythm: Normal rate and regular rhythm.      Heart sounds: S1 normal and S2 normal. No murmur.   Pulmonary:      Effort: Pulmonary effort is normal. No tachypnea.      Breath sounds: Normal breath sounds and air entry. No wheezing, rhonchi or rales.   Abdominal:      General: Bowel sounds are normal.  There is no distension.      Palpations: Abdomen is soft.      Tenderness: There is no abdominal tenderness.   Musculoskeletal: Normal range of motion.   Skin:     General: Skin is warm and dry.      Findings: No erythema or rash.   Neurological:      General: No focal deficit present.      Mental Status: He is alert and oriented to person, place, and time.      GCS: GCS eye subscore is 4. GCS verbal subscore is 5. GCS motor subscore is 6.      Cranial Nerves: Cranial nerves are intact.      Sensory: Sensation is intact.      Motor: Motor function is intact.      Coordination: Coordination is intact.      Comments: Only mild weakness to LLE.   Psychiatric:         Mood and Affect: Mood and affect normal.         Speech: Speech normal.         Behavior: Behavior normal. Behavior is cooperative.         Cognition and Memory: Memory normal.             Significant Labs:  Results for orders placed or performed during the hospital encounter of 07/27/20   CBC W/ AUTO DIFFERENTIAL   Result Value Ref Range    WBC 10.00 3.90 - 12.70 K/uL    RBC 5.29 4.60 - 6.20 M/uL    Hemoglobin 14.2 14.0 - 18.0 g/dL    Hematocrit 44.8 40.0 - 54.0 %    Mean Corpuscular Volume 85 82 - 98 fL    Mean Corpuscular Hemoglobin 26.8 (L) 27.0 - 31.0 pg    Mean Corpuscular Hemoglobin Conc 31.7 (L) 32.0 - 36.0 g/dL    RDW 15.4 (H) 11.5 - 14.5 %    Platelets 268 150 - 350 K/uL    MPV 10.6 9.2 - 12.9 fL    Immature Granulocytes 0.4 0.0 - 0.5 %    Gran # (ANC) 8.6 (H) 1.8 - 7.7 K/uL    Immature Grans (Abs) 0.04 0.00 - 0.04 K/uL    Lymph # 0.9 (L) 1.0 - 4.8 K/uL    Mono # 0.5 0.3 - 1.0 K/uL    Eos # 0.0 0.0 - 0.5 K/uL    Baso # 0.01 0.00 - 0.20 K/uL    nRBC 0 0 /100 WBC    Gran% 86.1 (H) 38.0 - 73.0 %    Lymph% 8.7 (L) 18.0 - 48.0 %    Mono% 4.7 4.0 - 15.0 %    Eosinophil% 0.0 0.0 - 8.0 %    Basophil% 0.1 0.0 - 1.9 %    Differential Method Automated    Comprehensive metabolic panel   Result Value Ref Range    Sodium 136 136 - 145 mmol/L    Potassium 4.0  3.5 - 5.1 mmol/L    Chloride 99 95 - 110 mmol/L    CO2 22 (L) 23 - 29 mmol/L    Glucose 143 (H) 70 - 110 mg/dL    BUN, Bld 12 8 - 23 mg/dL    Creatinine 1.0 0.5 - 1.4 mg/dL    Calcium 9.7 8.7 - 10.5 mg/dL    Total Protein 8.8 (H) 6.0 - 8.4 g/dL    Albumin 4.6 3.5 - 5.2 g/dL    Total Bilirubin 0.8 0.1 - 1.0 mg/dL    Alkaline Phosphatase 88 55 - 135 U/L    AST 25 10 - 40 U/L    ALT 17 10 - 44 U/L    Anion Gap 15 8 - 16 mmol/L    eGFR if African American >60 >60 mL/min/1.73 m^2    eGFR if non African American >60 >60 mL/min/1.73 m^2   Protime-INR   Result Value Ref Range    Prothrombin Time 10.6 9.0 - 12.5 sec    INR 1.0 0.8 - 1.2   TSH   Result Value Ref Range    TSH 0.454 0.400 - 4.000 uIU/mL   Urinalysis, Reflex to Urine Culture Urine, Clean Catch    Specimen: Urine   Result Value Ref Range    Specimen UA Urine, Clean Catch     Color, UA Yellow Yellow, Straw, Latasha    Appearance, UA Clear Clear    pH, UA 8.0 5.0 - 8.0    Specific Gravity, UA 1.020 1.005 - 1.030    Protein, UA Negative Negative    Glucose, UA 1+ (A) Negative    Ketones, UA Negative Negative    Bilirubin (UA) Negative Negative    Occult Blood UA Trace (A) Negative    Nitrite, UA Negative Negative    Urobilinogen, UA Negative <2.0 EU/dL    Leukocytes, UA Negative Negative   COVID-19 Rapid Screening   Result Value Ref Range    SARS-CoV-2 RNA, Amplification, Qual Negative Negative   POCT glucose   Result Value Ref Range    POCT Glucose 153 (H) 70 - 110 mg/dL      All pertinent labs within the past 24 hours have been reviewed.    Significant Imaging:  Imaging Results          CTA Head and Neck (xpd) (Final result)  Result time 07/27/20 19:06:56    Final result by Chinmay Love MD (07/27/20 19:06:56)                 Impression:      There is a short segment occlusion involving the distal left vertebral artery with some calcified plaque present suggesting underlying stenosis.  However there is apparent short segment of thrombus which appears acute within  this distal left vertebral artery.  There is some distal reconstitution.    Distally there is good filling of the basilar artery, though there is a moderate area of stenosis in the mid to distal basilar artery.  No acute thrombi are noted intracranially in the posterior circulation.    No acute thromboembolic disease in the anterior circulation.  No significant carotid atherosclerosis is evident.    Discussed with Dr. Soto 19:05 07/27/2020.    All CT scans at this facility are performed  using dose modulation techniques as appropriate to performed exam including the following:  automated exposure control; adjustment of mA and/or kV according to the patients size (this includes techniques or standardized protocols for targeted exams where dose is matched to indication/reason for exam: i.e. extremities or head);  iterative reconstruction technique.      Electronically signed by: Chinmay Love MD  Date:    07/27/2020  Time:    19:06             Narrative:    EXAMINATION:  CTA HEAD AND NECK (XPD)    CLINICAL HISTORY:  Ataxia, stroke suspected;    TECHNIQUE:  Non contrast low dose axial images were obtained through the head.  CT angiogram was performed from the level of the rhona to the top of the head following the IV administration of intravenous contrast.  Sagittal and coronal reconstructions and maximum intensity projection reconstructions were performed. Arterial stenosis percentages are based on NASCET measurement criteria.    COMPARISON:  None    FINDINGS:  CT angiogram neck:    Mild to moderate atherosclerosis transverse arch.  Some tortuosity proximal right common carotid artery.  Mild scattered atherosclerosis carotid arteries.  Minimal calcified plaque around the left carotid bifurcation.  Negative for hemodynamically significant stenosis.  Bilateral vertebral arteries are patent without significant atherosclerosis or stenosis proximally.  Some atherosclerosis at the distal left vertebral artery which  appears occluded for a short segment with distal reconstitution.  Some calcified plaque present.  There does appear to be a small amount of thrombus present within the distal left vertebral artery (series 2, images 348 through 390).  There is a short segment of narrowing involving the distal basilar artery of mild-to-moderate severity.    CT angiogram head:    Some calcified plaque at the skull base vasculature more notably involving the carotid arteries.  No acute thrombi are noted.  No significant stenoses evident.  No significant stenoses anterior circulation.                               X-Ray Chest AP Portable (Final result)  Result time 07/27/20 16:38:38    Final result by Farhan Harman Jr., MD (07/27/20 16:38:38)                 Impression:      Left lower lobe opacity could relate to consolidation and pneumonia.      Electronically signed by: Farhan Harman Jr., MD  Date:    07/27/2020  Time:    16:38             Narrative:    EXAMINATION:  XR CHEST AP PORTABLE    CLINICAL HISTORY:  Stroke;    COMPARISON:  Prior radiograph from March 7, 2018.    FINDINGS:  Left lower lobe atelectasis or consolidation at obscures portions of the left hemidiaphragm and results in retrocardiac opacity.  No pneumothorax.  Heart size within normal limits.  Again demonstrated is a tortuous aorta.  No significant bony findings.                               CT Head Without Contrast (Final result)  Result time 07/27/20 16:27:58    Final result by Farhan Harman Jr., MD (07/27/20 16:27:58)                 Impression:      1. No acute findings.  2. Chronic lacunar infarct anterior limb of right internal capsule.  3. Mild to moderate changes of chronic microvascular ischemia involving the bifrontal lobe white matter.  All CT scans at this facility use dose modulation, iterative reconstruction, and/or weight base dosing when appropriate to reduce radiation dose to as low as reasonably achievable.      Electronically signed by: Farhan  Ghazal Novoa MD  Date:    07/27/2020  Time:    16:27             Narrative:    EXAMINATION:  CT HEAD WITHOUT CONTRAST    CLINICAL HISTORY:  Ataxia, stroke suspected;Demyelinating disease suspected (Ped 0-18y);    TECHNIQUE:  Contiguous axial CT images were obtained from the skull base through the vertex without intravenous contrast.    COMPARISON:  None    FINDINGS:  No intracranial hemorrhage. No mass effect or midline shift. Chronic lacunar infarct within the anterior limb of the right internal capsule.  Mild to moderate degree of patchy low-density within the bifrontal lobe white matter bilaterally.  The ventricles and sulci are normal in size and configuration. The paranasal sinuses and mastoid air cells are clear.  No concerning osseous findings.                               I have reviewed all pertinent imaging results/findings within the past 24 hours.     EKG: (personally reviewed)  Sinus rhythm with frequent PVCs, nonspecific T wave abnormality. No significant change from previous tracings.

## 2020-07-28 NOTE — SUBJECTIVE & OBJECTIVE
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      Current Outpatient Medications on File Prior to Encounter   Medication Sig    acetaminophen (TYLENOL) 325 MG tablet Take 325 mg by mouth as needed.    amLODIPine (NORVASC) 5 MG tablet Take 1 tablet (5 mg total) by mouth 2 (two) times daily.    hydrALAZINE (APRESOLINE) 50 MG tablet Take 1 tablet (50 mg total) by mouth every 12 (twelve) hours.    losartan (COZAAR) 100 MG tablet Take 1 tablet (100 mg total) by mouth once daily.    donepezil (ARICEPT) 10 MG tablet Take 1 tablet (10 mg total) by mouth every evening.    hydroCHLOROthiazide (HYDRODIURIL) 12.5 MG Tab Take 1 tablet (12.5 mg total) by mouth every 48 hours. for 3 doses     Family History     Problem Relation (Age of Onset)    Diabetes Father, Sister, Brother, Son    Glaucoma Sister    Heart disease Mother, Sister, Brother    Hypertension Mother, Sister, Brother, Son    Kidney disease Daughter    Prostate cancer Father    Stomach cancer Daughter    Stroke Brother    Ulcers Sister        Tobacco Use    Smoking status: Former Smoker     Quit date: 2006     Years since quittin.0    Smokeless tobacco: Never Used   Substance and Sexual Activity    Alcohol use: No     Frequency: Never     Binge frequency: Never    Drug use: No    Sexual activity: Not on file     Review of Systems   Constitutional: Positive for fatigue.   Neurological: Positive for  "weakness.   All other systems reviewed and are negative.    Objective:     Vital Signs (Most Recent):  Temp: 98 °F (36.7 °C) (07/28/20 0827)  Pulse: 66 (07/28/20 1306)  Resp: 16 (07/28/20 0827)  BP: (!) 142/81 (07/28/20 1012)  SpO2: 96 % (07/28/20 0827) Vital Signs (24h Range):  Temp:  [97.2 °F (36.2 °C)-98.6 °F (37 °C)] 98 °F (36.7 °C)  Pulse:  [65-91] 66  Resp:  [14-20] 16  SpO2:  [96 %-99 %] 96 %  BP: (142-200)/() 142/81     Weight: 88.5 kg (195 lb)  Body mass index is 25.04 kg/m².    PHYSICAL EXAMINATION  VITALS:  BP (!) 142/81   Pulse 66   Temp 98 °F (36.7 °C)   Resp 16   Ht 6' 2" (1.88 m)   Wt 88.5 kg (195 lb)   SpO2 96%   BMI 25.04 kg/m²        - HENT: Normal Temporal artery pulses. No skull or sinus tenderness.  - Eyes: Conjunctivae and lids are normal. Fundus exam: Sharp margins of optic discs with positive venous pulsation.  - Neck: Normal range of motion and full passive range of motion without pain. Neck supple. Carotid bruit is not present. Negative Lhermitte's sign.  - Cardiac: Heart rate regular.  Intact pulses.  No edema.  - Pulmonary:  No wheezing.  - Extremities: ROM within functional limits.  No joint effusions.  No edema, pain.  - Skin: No skin lesions      - Mental status: Alert and Oriented x 3, Appropriate mood but mild-to-moderate flat affect  - Cranial nerves:    I: not tested    II: Pupils equal, round and reactive to light and accommodation.    III, IV, VI: Extraocular movements were abnormal:  Absent right gaze without any nystagmus, limited upgaze, normal alternate cover test     V: Sensation intact in V1, V2 and V3 distribution bilaterally    VII: Face symmetric, facial strength intact bilaterally    VIII: Hearing intact to finger rub bilaterally    IX and X: Palate elevates symmetrically    XI: Shoulder shrug and head turn intact bilaterally    XII: Tongue protrudes midline    - Motor: Full strength in the extremities, proximally and distally.  Normal tone.  No pathologic " movements or resting tremors.  But there was significant hypokinesia and rigidity.    - Somatosensory: Intact in all extremities to light touch, pain, temperature and vibration.    - Reflexes: 1+ in biceps, triceps, brachioradialis, quadriceps, and ankles.  Toes downgoing bilaterally.    - Coordination: Normal finger-to-nose, fine finger movements, heel-to-shin, and rapid alternating movements.    - Gait: Deferred due to pending physical therapy and activity level.      Significant Labs:   BMP:   Recent Labs   Lab 07/27/20 1625 07/28/20  0429   * 102    136   K 4.0 4.1   CL 99 98   CO2 22* 24   BUN 12 11   CREATININE 1.0 0.9   CALCIUM 9.7 10.0   MG  --  2.0     CBC:   Recent Labs   Lab 07/27/20 1625 07/28/20 0429   WBC 10.00 9.05   HGB 14.2 14.6   HCT 44.8 46.1    279     CMP:   Recent Labs   Lab 07/27/20 1625 07/28/20  0429   * 102    136   K 4.0 4.1   CL 99 98   CO2 22* 24   BUN 12 11   CREATININE 1.0 0.9   CALCIUM 9.7 10.0   MG  --  2.0   PROT 8.8* 8.4   ALBUMIN 4.6 4.4   BILITOT 0.8 1.2*   ALKPHOS 88 93   AST 25 29   ALT 17 16   ANIONGAP 15 14   EGFRNONAA >60 >60       Significant Imaging: CT: I have reviewed all pertinent results/findings within the past 24 hours and my personal findings are:  Diffuse cerebral atrophy without any acute infarction, some white matter disease consistent with small vessel disease.  I have reviewed and interpreted all pertinent imaging results/findings within the past 24 hours.

## 2020-07-28 NOTE — PT/OT/SLP EVAL
Physical Therapy Evaluation    Patient Name:  Chinmay Mei   MRN:  [id number]    Recommendations:     Discharge Recommendations:  nursing facility, skilled, home health PT(DEPENDING ON PROGRESS WITH POC)   Discharge Equipment Recommendations: walker, rolling, bath bench   Barriers to discharge: None    Assessment:     Chinmay Mei is a 86 y.o. male admitted with a medical diagnosis of Cerebrovascular accident (CVA) due to thrombosis of left vertebral artery.  He presents with the following impairments/functional limitations:  weakness, impaired endurance, impaired balance, gait instability, impaired functional mobilty.    Rehab Prognosis: Good; patient would benefit from acute skilled PT services to address these deficits and reach maximum level of function.    Recent Surgery: * No surgery found *    Plan:     During this hospitalization, patient to be seen 5 x/week to address the identified rehab impairments via gait training, therapeutic activities, therapeutic exercises and progress toward the following goals:    · Plan of Care Expires:  08/04/20    Subjective     Chief Complaint: C/O NAUSEA, REST GIVEN AS NEEDED  Patient/Family Comments/goals:   Pain/Comfort:  · Pain Rating 1: 0/10    Patients cultural, spiritual, Moravian conflicts given the current situation:      Living Environment:  PT LIVES ALONE BUT REPORTS HE HAS FAMILY TO COME OVER AND ASSIST HIM AS NEEDED, 1 STORY HOUSE NO STEPS, AMB INDEP COMMUNITY DISTANCES, STILL DRIVES AND WORKS AROUND THE HOUSE, INDEP WITH ADL'S  Prior to admission, patients level of function was INDEP.  Equipment used at home: grab bar.  DME owned (not currently used): none.  Upon discharge, patient will have assistance from FAMILY.    Objective:     Communicated with NURSE HUANG prior to session.  Patient found supine with bed alarm, telemetry, peripheral IV  upon PT entry to room.    General Precautions: Standard, fall   Orthopedic Precautions:N/A   Braces: N/A      Exams:  · Cognitive Exam:  Patient is oriented to Person, Place, Time and Situation  · Postural Exam:  Patient presented with the following abnormalities:    · -       Rounded shoulders  · -       Forward head  · Sensation:    · -       Intact  · RLE ROM: WFL  · RLE Strength: GROSSLY 4/5  · LLE ROM: WFL  · LLE Strength: GROSSLY 4/5    Functional Mobility:  · Bed Mobility:     · Rolling Left:  contact guard assistance  · Scooting: contact guard assistance  · Supine to Sit: contact guard assistance  · Transfers:     · Sit to Stand:  minimum assistance with rolling walker  · Bed to Chair: minimum assistance with  rolling walker  using  Step Transfer  · Gait: PT AMB 20' IN ROOM WITH RW AND LALO, SLOW PACED UNSTEADY GAIT, DIFFICULTY CLEARING FEET, GUARDED DUE TO FEAR OF FALLING, QUICK TO FATIGUE  · Balance: POOR    Therapeutic Activities and Exercises:   PT EDUCATED IN ROLE OF P.T. AND POC, PT EDUCATED IN RW USE AND SAFETY DURING TF'S AND GAIT, PT EDUCATED IN BLE THEREX TO PERFORM WHILE SEATED IN CHAIR, PT ENCOURAGED TO INCREASE TIME OOB IN CHAIR    AM-PAC 6 CLICK MOBILITY  Total Score:16     Patient left up in chair with all lines intact, call button in reach, chair alarm on and NURSE notified.    GOALS:   Multidisciplinary Problems     Physical Therapy Goals        Problem: Physical Therapy Goal    Goal Priority Disciplines Outcome Goal Variances Interventions   Physical Therapy Goal     PT, PT/OT      Description: LTG'S TO BE MET IN 7 DAYS (8-4-20)  1. PT WILL REQUIRE SBA FOR BED MOBILITY  2. PT WILL REQUIRE SBA FOR TF'S  3. 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

## 2020-07-28 NOTE — ED NOTES
Pt lying in bed comfortably. AAO x 4. Skin warm and dry. Resp even and unlabored with equal chest rise and fall. Denies any needs or assist at this time. Will continue to monitor.

## 2020-07-28 NOTE — CONSULTS
[facility] -   Neurology  Consult Note    Patient Name: Chinmay Mei  MRN: [id number]  Admission Date: 7/27/2020  Hospital Length of Stay: 0 days  Code Status: Full Code   Attending Provider: Bin Duenas MD   Consulting Provider: Sheikh LILIANE Jimenez MD  Primary Care Physician: Luis E Leach MD  Principal Problem:Cerebrovascular accident (CVA) due to thrombosis of left vertebral artery    Inpatient consult to Neurology  Consult performed by: Sheikh ALEENA Jimenez MD  Consult ordered by: Phoebe Falk NP  Reason for consult: Stroke         Subjective:     Chief Complaint:  Concern for stroke     HPI:   86-year-old male with a history of hypertension and dyslipidemia presented with numbness in his lower extremities that started on 07/27/2020 around 10:00 a.m..  Patient was okay before that.     On further questioning, patient clarified that is not the sensory changes but her symptoms are more related to motor control/heaviness in his lower extremities.  He was unable to stand up from his bathtub.  He was unable to localize to 1 side and his symptoms were more generalized and bilateral lower extremities.  Patient denies any sensory changes associated with it.  His symptoms have been improving but he still feels like he is not back to his baseline yet.    Patient presented to ER on 07/27/2020 and he got CT brain that was negative for acute infarction.  He never got tPA before does he was out of the window.  Patient was evaluated by tele neurologist Dr. Angelo Simms who recommended MRI and CTA and starting dual antiplatelet therapy.  Patient had MRI brain with and without contrast that was negative for acute intracranial pathology or acute stroke.  But his CTA was remarkable for multi vessel stenosis.  Please see report for details.     Patient denies any other neurological symptoms except above -  like sensory changes, speech changes,  facial asymmetry,vision changes /double vision or  dizziness/ balance problem.          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      Current Outpatient Medications on File Prior to Encounter   Medication Sig    acetaminophen (TYLENOL) 325 MG tablet Take 325 mg by mouth as needed.    amLODIPine (NORVASC) 5 MG tablet Take 1 tablet (5 mg total) by mouth 2 (two) times daily.    hydrALAZINE (APRESOLINE) 50 MG tablet Take 1 tablet (50 mg total) by mouth every 12 (twelve) hours.    losartan (COZAAR) 100 MG tablet Take 1 tablet (100 mg total) by mouth once daily.    donepezil (ARICEPT) 10 MG tablet Take 1 tablet (10 mg total) by mouth every evening.    hydroCHLOROthiazide (HYDRODIURIL) 12.5 MG Tab Take 1 tablet (12.5 mg total) by mouth every 48 hours. for 3 doses     Family History     Problem Relation (Age of Onset)    Diabetes Father, Sister, Brother, Son    Glaucoma Sister    Heart disease Mother, Sister, Brother    Hypertension Mother, Sister, Brother, Son    Kidney disease Daughter    Prostate cancer Father    Stomach cancer Daughter    Stroke Brother    Ulcers Sister        Tobacco Use    Smoking status: Former Smoker     Quit date: 2006     Years since quittin.0    Smokeless tobacco: Never Used   Substance and Sexual Activity    Alcohol use: No     Frequency: Never     Binge frequency: Never    Drug use: No    Sexual activity: Not on file     Review of Systems   Constitutional: Positive for  "fatigue.   Neurological: Positive for weakness.   All other systems reviewed and are negative.    Objective:     Vital Signs (Most Recent):  Temp: 98 °F (36.7 °C) (07/28/20 0827)  Pulse: 66 (07/28/20 1306)  Resp: 16 (07/28/20 0827)  BP: (!) 142/81 (07/28/20 1012)  SpO2: 96 % (07/28/20 0827) Vital Signs (24h Range):  Temp:  [97.2 °F (36.2 °C)-98.6 °F (37 °C)] 98 °F (36.7 °C)  Pulse:  [65-91] 66  Resp:  [14-20] 16  SpO2:  [96 %-99 %] 96 %  BP: (142-200)/() 142/81     Weight: 88.5 kg (195 lb)  Body mass index is 25.04 kg/m².    PHYSICAL EXAMINATION  VITALS:  BP (!) 142/81   Pulse 66   Temp 98 °F (36.7 °C)   Resp 16   Ht 6' 2" (1.88 m)   Wt 88.5 kg (195 lb)   SpO2 96%   BMI 25.04 kg/m²        - HENT: Normal Temporal artery pulses. No skull or sinus tenderness.  - Eyes: Conjunctivae and lids are normal. Fundus exam: Sharp margins of optic discs with positive venous pulsation.  - Neck: Normal range of motion and full passive range of motion without pain. Neck supple. Carotid bruit is not present. Negative Lhermitte's sign.  - Cardiac: Heart rate regular.  Intact pulses.  No edema.  - Pulmonary:  No wheezing.  - Extremities: ROM within functional limits.  No joint effusions.  No edema, pain.  - Skin: No skin lesions      - Mental status: Alert and Oriented x 3, Appropriate mood but mild-to-moderate flat affect  - Cranial nerves:    I: not tested    II: Pupils equal, round and reactive to light and accommodation.    III, IV, VI: Extraocular movements were abnormal:  Absent right gaze without any nystagmus, limited upgaze, normal alternate cover test     V: Sensation intact in V1, V2 and V3 distribution bilaterally    VII: Face symmetric, facial strength intact bilaterally    VIII: Hearing intact to finger rub bilaterally    IX and X: Palate elevates symmetrically    XI: Shoulder shrug and head turn intact bilaterally    XII: Tongue protrudes midline    - Motor: Full strength in the extremities, proximally and " distally.  Normal tone.  No pathologic movements or resting tremors.  But there was significant hypokinesia and rigidity.    - Somatosensory: Intact in all extremities to light touch, pain, temperature and vibration.    - Reflexes: 1+ in biceps, triceps, brachioradialis, quadriceps, and ankles.  Toes downgoing bilaterally.    - Coordination: Normal finger-to-nose, fine finger movements, heel-to-shin, and rapid alternating movements.    - Gait: Deferred due to pending physical therapy and activity level.      Significant Labs:   BMP:   Recent Labs   Lab 07/27/20 1625 07/28/20  0429   * 102    136   K 4.0 4.1   CL 99 98   CO2 22* 24   BUN 12 11   CREATININE 1.0 0.9   CALCIUM 9.7 10.0   MG  --  2.0     CBC:   Recent Labs   Lab 07/27/20 1625 07/28/20  0429   WBC 10.00 9.05   HGB 14.2 14.6   HCT 44.8 46.1    279     CMP:   Recent Labs   Lab 07/27/20 1625 07/28/20  0429   * 102    136   K 4.0 4.1   CL 99 98   CO2 22* 24   BUN 12 11   CREATININE 1.0 0.9   CALCIUM 9.7 10.0   MG  --  2.0   PROT 8.8* 8.4   ALBUMIN 4.6 4.4   BILITOT 0.8 1.2*   ALKPHOS 88 93   AST 25 29   ALT 17 16   ANIONGAP 15 14   EGFRNONAA >60 >60       Significant Imaging: CT: I have reviewed all pertinent results/findings within the past 24 hours and my personal findings are:  Diffuse cerebral atrophy without any acute infarction, some white matter disease consistent with small vessel disease.  I have reviewed and interpreted all pertinent imaging results/findings within the past 24 hours.    Assessment and Plan:     * Cerebrovascular accident (CVA) due to thrombosis of left vertebral artery  # Acute Ischemic Stroke (clinical diagnosis) :  Symptoms ( heaviness in lower extremities ) Onset - 7/27/2020  at 10:00 AM , symptoms are still improving.  MRI brain is negative but brainstem tiny stroke can be missed with the MRI.  The patient has significant basilar artery and vertebral artery stenosis that can cause tiny  brainstem stroke.    - NIHSS: 00 ( 7/28/2020  - Neuro Exam: WNL )    - Risk Factors: HTN, Dyslipidemia.     Localization: Likely ? Pontine with motor weakness only (basis pontis).     Etiology: Need to consider stenosis (Atheorosclerotic)    Recommended Workup / Evaluation:      - MRI Brain w/wo contrast. :  Diffuse cerebral atrophy with some white matter disease.  No acute stroke or hemorrhage seen.  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   - Telemetry for at least 24 hours to monitor for arrhthymias.       - TTE : Pending   - Fasting labs in AM -   LFTs: WNL  HbA1c: 5.5  Lipids: LDL > 150    Treatment:      - Neuro checks per Stroke/TIA protocol.       - BP Goals: Normotensive (Normal BP)    - Start DAPT (Dual antiplatelet therapy - Aspirin 81 mg and Plavix 75 mg daily) for 3 months followed by aspirin 81 mg alone daily.   - Lipid lowering: Atorvastatin 40 mg Daily  - Smoking cessation: No Smoking     - Physical Therapy / Occupational Therapy for rehabilitation.     - Recommended to follow up with primary care physician for risk factors modification  - better control of hypertension and dyslipidemia.     - Recommended to get evaluated by interventional neurosurgeon or interventional Neurology for possible angiography and angioplasty for vertebral artery and basilar artery stenosis.  I do not see any acute blockage.  It can be addressed as outpatient within 2-4 weeks.  Patient is educated to maintain his hydration and blood pressure within normal range.  Low blood pressure can also cause ischemic stroke in the distribution of stenosed vessels.  Patient understood and agreed with the recommendation.    - In addition, patient also needed to be evaluated by General Neurology as outpatient for evaluation of parkinsonism.  Patient have been dealing with progressive worsening gait with stiffness in his upper extremities as well as hypokinesia.  I do not see any resting  tremors.  This can be addressed as outpatient once patient is out of this acute phase.     Neurology will sign off.  Please call us if there is any question.  Plan discussed with primary team, patient and his family.  Please call us if there is abnormal findings on echocardiogram.      Sheikh ALEENA Jimenez MD  Neurology, Neuro-Ophthalmology.    [place], [place]             STROKE DOCUMENTATION     Acute Stroke Times   Last Known Normal Time: 1000  Stroke Team Called Time: 1618  Stroke Team Arrival Time: 1620  CT Interpretation Time: 1621    NIH Scale:        Modified [place]    [place] Coma Scale:    ABCD2 Score:    [id number] Score:   HAS -BLED Score:   ICH Score:   Hunt & Mahan Classification:      VTE Risk Mitigation (From admission, onward)         Ordered     enoxaparin injection 40 mg  Every 24 hours      07/27/20 1932     IP VTE HIGH RISK PATIENT  Once      07/27/20 1932     Place sequential compression device  Until discontinued      07/27/20 1932                Thank you for your consult. I will sign off. Please contact us if you have any additional questions.    Sheikh LILIANE Jimenez MD  Neurology  [facility] -

## 2020-07-28 NOTE — PT/OT/SLP EVAL
Occupational Therapy   Evaluation    Name: Chinmay Mei  MRN: [id number]  Admitting Diagnosis:  Cerebrovascular accident (CVA) due to thrombosis of left vertebral artery      Recommendations:     Discharge Recommendations: nursing facility, skilled, home with home health  Discharge Equipment Recommendations:  walker, rolling  Barriers to discharge:  Decreased caregiver support    Assessment:     Chinmay Mei is a 86 y.o. male with a medical diagnosis of Cerebrovascular accident (CVA) due to thrombosis of left vertebral artery.  He presents with impaired functional mobility and ADLs. Performance deficits affecting function: weakness, impaired endurance, impaired self care skills, impaired functional mobilty, gait instability, impaired balance, decreased coordination, decreased safety awareness.      Rehab Prognosis: Fair; patient would benefit from acute skilled OT services to address these deficits and reach maximum level of function.       Plan:     Patient to be seen 3 x/week to address the above listed problems via self-care/home management, therapeutic activities, therapeutic exercises  · Plan of Care Expires: 08/04/20  · Plan of Care Reviewed with: patient    Subjective     Chief Complaint: dizziness with movement  Patient/Family Comments/goals: to increase independence    Occupational Profile:  Living Environment: lives alone in 1 story house with no steps  Previous level of function: (I) with ADLs and Ambulation  Roles and Routines: Drive, does work around the house  Equipment Used at Home:  grab bar  Assistance upon Discharge: FAMILY?    Pain/Comfort:  · Pain Rating 1: 0/10    Patients cultural, spiritual, Confucianist conflicts given the current situation:      Objective:     Communicated with: Nurse Hammond and epic chart review prior to session.  Patient found supine with bed alarm, telemetry, peripheral IV upon OT entry to room.    General Precautions: Standard, fall   Orthopedic Precautions:N/A   Braces:  N/A     Occupational Performance:    Bed Mobility:    · Patient completed Rolling/Turning to Left with  contact guard assistance  · Patient completed Rolling/Turning to Right with contact guard assistance  · Patient completed Scooting/Bridging with contact guard assistance  · Patient completed Supine to Sit with contact guard assistance    Functional Mobility/Transfers:  · Patient completed Sit <> Stand Transfer with minimum assistance  with  rolling walker   · Patient completed Bed <> Chair Transfer using Step Transfer technique with minimum assistance with rolling walker  · Functional Mobility: ~20ft using RW with mod A    Activities of Daily Living:  · Upper Body Dressing: stand by assistance .  · Lower Body Dressing: minimum assistance ; donned brief in standing with Total A     Cognitive/Visual Perceptual:  Cognitive/Psychosocial Skills:     -       Oriented to: Person, Place, Time and Situation   -       Follows Commands/attention:Follows two-step commands  -       Communication: clear/fluent  -       Memory: No Deficits noted  -       Safety awareness/insight to disability: impaired   -       Mood/Affect/Coping skills/emotional control: Appropriate to situation  Visual/Perceptual:      -Intact .    Physical Exam:  Dominant hand:    -       right  Upper Extremity Range of Motion:     -       Right Upper Extremity: WFL  -       Left Upper Extremity: WFL  Upper Extremity Strength:    -       Right Upper Extremity: 4/5 grossly  -       Left Upper Extremity: 4/5 grossly   Strength:    -       Right Upper Extremity: WFL  -       Left Upper Extremity: WFL    AMPAC 6 Click ADL:  AMPAC Total Score: 18    Treatment & Education:  OT Eval completed as listed above. Pt educated in role of OT and POC.   Education:    Patient left up in chair with all lines intact, call button in reach, chair alarm on and Nurse Stephany notified    GOALS:   Multidisciplinary Problems     Occupational Therapy Goals        Problem:  Occupational Therapy Goal    Goal Priority Disciplines Outcome Interventions   Occupational Therapy Goal     OT, PT/OT     Description: LTGs to be met by 8/4/2020  1. Pt will perform LE dressing with Supervision  2. Pt will perform G/H tasks in standing at sink with Supervision  3. Pt will perform toilet t/fs with SBA  4. 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

## 2020-07-28 NOTE — PLAN OF CARE
OT Eval completed. Recommend SNF vs Home with Home health and Supervision, depending on progress with therapy.

## 2020-07-28 NOTE — PLAN OF CARE
Plan of care reviewed with patient at bedside; verbalized understanding.  Pt is AAOX4 and VSS  Pt remained afebrile throughout this shift.   PRN medication administered for blood pressure.  Pt denies pain this shift.  Pt moving/turning independent. Frequent weight shifting encouraged.  Bed low, side rails up x 2, wheels locked, call light in reach.  PIV intact. Cardiac monitor in place  No c/o or questions at this time.   Will continue to monitor.

## 2020-07-28 NOTE — NURSING
Received report from MENDOZA Hammond. I agree with her documented assessment.   Pt AAO x4.  Pt remains free of falls throughout shift.  Pt denies pain throughout shift.  Plan of care reviewed. Pt verbalizes understanding.  Pt moving and turning independently. Frequent weight shifting encouraged.  Normal sinus rhythm on monitor.  Hourly rounding completed.  Will continue to monitor.

## 2020-07-28 NOTE — HPI
86-year-old male with a history of hypertension and dyslipidemia presented with numbness in his lower extremities that started on 07/27/2020 around 10:00 a.m..  Patient was okay before that.     On further questioning, patient clarified that is not the sensory changes but her symptoms are more related to motor control/heaviness in his lower extremities.  He was unable to stand up from his bathtub.  He was unable to localize to 1 side and his symptoms were more generalized and bilateral lower extremities.  Patient denies any sensory changes associated with it.  His symptoms have been improving but he still feels like he is not back to his baseline yet.    Patient presented to ER on 07/27/2020 and he got CT brain that was negative for acute infarction.  He never got tPA before does he was out of the window.  Patient was evaluated by tele neurologist Dr. Angelo Simms who recommended MRI and CTA and starting dual antiplatelet therapy.  Patient had MRI brain with and without contrast that was negative for acute intracranial pathology or acute stroke.  But his CTA was remarkable for multi vessel stenosis.  Please see report for details.     Patient denies any other neurological symptoms except above -  like sensory changes, speech changes,  facial asymmetry,vision changes /double vision or dizziness/ balance problem.

## 2020-07-28 NOTE — HPI
"Chinmay Mei is a 86 y.o. male patient with a PMHx of arthritis, HLD, and HTN who presented to the Emergency Department with c/o dizziness which onset gradually about 6 hours PTA. No mitigating or exacerbating factors reported. He describes the dizziness as "feeling off balance". Associated mild LLE weakness. He reports leaning to the left side when trying to ambulate causing him to fall multiple times PTA. Patient denies any HA, visual disturbance, syncope, confusion, CP, SOB, ABD pain, N/V/D, back pain, neck pain, facial droop, slurred speech, fever or chills. CT of the head showed no acute findings, chronic lacunar infarct anterior limb of right internal capsule, and mild to moderate changes of chronic microvascular ischemia involving the bifrontal lobe white matter. Vascular Neurology consulted in the ED via TeleStroke, and recommended further imaging to r/o occlusion needing intervention. CTA head and neck showed a short segment occlusion involving the distal left vertebral artery with some calcified plaque present suggesting underlying stenosis, however, there is apparent short segment of thrombus which appears acute within this distal left vertebral artery with some distal reconstitution; distally there is good filling of the basilar artery, though there is a moderate area of stenosis in the mid to distal basilar artery; no acute thrombi are noted intracranially in the posterior circulation; no acute thromboembolic disease in the anterior circulation; no significant carotid atherosclerosis is evident. Vascular Neurology reviewed CTA results and felt findings appear chronic. Patient received 325 mg ASA and 75 mg Plavix in the ED. Hospital Medicine was contacted for admission.     "

## 2020-07-28 NOTE — PLAN OF CARE
07/28/20 1430   Discharge Assessment   Assessment Type Discharge Planning Assessment   Confirmed/corrected address and phone number on facesheet? Yes   Assessment information obtained from? Patient;Caregiver   Prior to hospitilization cognitive status: Alert/Oriented   Prior to hospitalization functional status: Independent   Current cognitive status: Alert/Oriented   Current Functional Status: Needs Assistance   Lives With alone   Able to Return to Prior Arrangements yes   Is patient able to care for self after discharge? Unable to determine at this time (comments)   Who are your caregiver(s) and their phone number(s)? Deepali Mei (daughter) [phone]   Patient's perception of discharge disposition home health   Readmission Within the Last 30 Days no previous admission in last 30 days   Patient currently being followed by outpatient case management? No   Patient currently receives any other outside agency services? No   Equipment Currently Used at Home none   Do you have any problems affording any of your prescribed medications? No   Is the patient taking medications as prescribed? yes   Does the patient have transportation home? Yes   Transportation Anticipated family or friend will provide;agency   Does the patient receive services at the [facility] Clinic? No   Discharge Plan A Home Health   Discharge Plan B Skilled Nursing Facility   DME Needed Upon Discharge  walker, rolling   Patient/Family in Agreement with Plan yes     Met with pt and his daughter Deepali Mei at bedside for DC assessment. Pt lives at home alone and does not use any DME. PT/OT have recommended HH v SNF. Pt and daughter given list of HH and SNF facilities in the area that are covered by pt's insurance. Presently pt appears more interested in HH. PT recommended that pt DC with a RW. No other CM DC needs identified at this time. SWer provided a transitional care folder, information on advanced directives, information on pharmacy bedside  delivery, and discharge planning begins on admission with contact information for any needs/questions. Pt's pharmacy is [facility].    [id number] - [place], [place] - [address]  [address] [address]  Phone: [phone] Fax: [phone]    [facility] - [place], [place] - [address]  [address] [address]  Phone: [phone] Fax: [phone]    PCP: MD Roge Major LMSW 7/28/2020 2:38 PM

## 2020-07-28 NOTE — PLAN OF CARE
Patient A&Ox4. Negative Neuro checks q4h.   Room air.  NSR on telemetry.  Cardiac diet.  Urinal at bedside.  Up to chair with PT/OT.   Skin intact.  ECHO pending.  No complaints of pain.  All questions answered.  Will continue to monitor.    Baylee Cloud RN

## 2020-07-28 NOTE — PROVIDER PROGRESS NOTES - EMERGENCY DEPT.
Encounter Date: 7/27/2020    ED Physician Progress Notes       SCRIBE NOTE: I, Laquita Fenton, am scribing for, and in the presence of,  Moni Soto MD.  Physician Statement: IMoni MD, personally performed the services described in this documentation as scribed by Laquita Fenton in my presence, and it is both accurate and complete.          7:30 PM: Dr. Soto informed Phoebe Falk ([facility] medicine) of what CTA of head and neck showed per the Radiologist. There is a short segment occlusion involving the distal left vertebral artery with some calcified plaque present suggesting underlying stenosis. However there is apparent short segment of thrombus which appears acute within this distal left vertebral artery. There is some distal reconstitution.  [facility] medicine requested to notify vascularneurology of abnormal CTA head and neck findings to see if they want to intervene and to ensure pt does not need to go to main campus.    7:35 PM: Discussed pt's case with Dr. Spencer (tele stroke neurologist) who reviewed CTA and states findings appear chronic.

## 2020-07-28 NOTE — CONSULTS
Food & Nutrition  Education    Diet Education: Heart Healthy Diet  Time Spent: 15 minutes  Learners: pt and his daughter    Nutrition Education provided with handouts: Eating Heart Healthy foods handout    Comments: RD spoke to pt and his daughter. Pt reports loving vegetables and says that he usually cooks for himself, rather than going out to eat. He hasn't been limiting salt but plans to going forward. We discussed healthier ways of getting flavor into food (fresh herbs, hot sauce etc) as well as healthier fats. Remote education due to COVID precaution, but RD is adding handouts to discharge papers.     All questions and concerns answered.     Please Re-consult as needed    Thanks!

## 2020-07-28 NOTE — SUBJECTIVE & OBJECTIVE
Interval History:No events overnight.     Review of Systems  Objective:     Vital Signs (Most Recent):  Temp: 98 °F (36.7 °C) (07/28/20 0827)  Pulse: 72 (07/28/20 1103)  Resp: 16 (07/28/20 0827)  BP: (!) 142/81 (07/28/20 1012)  SpO2: 96 % (07/28/20 0827) Vital Signs (24h Range):  Temp:  [97.2 °F (36.2 °C)-98.6 °F (37 °C)] 98 °F (36.7 °C)  Pulse:  [65-91] 72  Resp:  [14-20] 16  SpO2:  [96 %-99 %] 96 %  BP: (142-200)/() 142/81     Weight: 88.5 kg (195 lb)  Body mass index is 25.04 kg/m².    Intake/Output Summary (Last 24 hours) at 7/28/2020 1156  Last data filed at 7/28/2020 0410  Gross per 24 hour   Intake --   Output 250 ml   Net -250 ml      Physical Exam  Constitutional:       General: He is awake. He is not in acute distress.     Appearance: Normal appearance. He is well-developed. He is not diaphoretic.   HENT:      Head: Normocephalic and atraumatic.   Eyes:      Conjunctiva/sclera: Conjunctivae normal.   Neck:      Musculoskeletal: Normal range of motion and neck supple.      Vascular: Normal carotid pulses. No carotid bruit.   Cardiovascular:      Rate and Rhythm: Normal rate and regular rhythm.      Heart sounds: S1 normal and S2 normal. No murmur.   Pulmonary:      Effort: Pulmonary effort is normal. No tachypnea.      Breath sounds: Normal breath sounds and air entry. No wheezing, rhonchi or rales.   Abdominal:      General: Bowel sounds are normal. There is no distension.      Palpations: Abdomen is soft.      Tenderness: There is no abdominal tenderness.   Musculoskeletal: Normal range of motion.   Skin:     General: Skin is warm and dry.      Findings: No erythema or rash.   Neurological:      General: No focal deficit present.      Mental Status: He is alert and oriented to person, place, and time.      GCS: GCS eye subscore is 4. GCS verbal subscore is 5. GCS motor subscore is 6.      Cranial Nerves: Cranial nerves are intact.      Sensory: Sensation is intact.      Motor: Motor function is  intact.      Coordination: Coordination is intact.      Comments: Only mild weakness to LLE.   Psychiatric:         Mood and Affect: Mood and affect normal.         Speech: Speech normal.         Behavior: Behavior normal. Behavior is cooperative.         Cognition and Memory: Memory normal.      Significant Labs: All pertinent labs within the past 24 hours have been reviewed.    Significant Imaging: I have reviewed and interpreted all pertinent imaging results/findings within the past 24 hours.

## 2020-07-28 NOTE — PROGRESS NOTES
"[facility] Hospital Medicine  Progress Note    Patient Name: Chinmay Mei  MRN: [id number]  Patient Class: OP- Observation   Admission Date: 7/27/2020  Length of Stay: 0 days  Attending Physician: Bin Duenas MD  Primary Care Provider: Luis E Leach MD        Subjective:     Principal Problem:Cerebrovascular accident (CVA) due to thrombosis of left vertebral artery        HPI:  Chinmay Mei is a 86 y.o. male patient with a PMHx of arthritis, HLD, and HTN who presented to the Emergency Department with c/o dizziness which onset gradually about 6 hours PTA. No mitigating or exacerbating factors reported. He describes the dizziness as "feeling off balance". Associated mild LLE weakness. He reports leaning to the left side when trying to ambulate causing him to fall multiple times PTA. Patient denies any HA, visual disturbance, syncope, confusion, CP, SOB, ABD pain, N/V/D, back pain, neck pain, facial droop, slurred speech, fever or chills. CT of the head showed no acute findings, chronic lacunar infarct anterior limb of right internal capsule, and mild to moderate changes of chronic microvascular ischemia involving the bifrontal lobe white matter. Vascular Neurology consulted in the ED via TeleStroke, and recommended further imaging to r/o occlusion needing intervention. CTA head and neck showed a short segment occlusion involving the distal left vertebral artery with some calcified plaque present suggesting underlying stenosis, however, there is apparent short segment of thrombus which appears acute within this distal left vertebral artery with some distal reconstitution; distally there is good filling of the basilar artery, though there is a moderate area of stenosis in the mid to distal basilar artery; no acute thrombi are noted intracranially in the posterior circulation; no acute thromboembolic disease in the anterior circulation; no significant carotid atherosclerosis is evident. " Vascular Neurology reviewed CTA results and felt findings appear chronic. Patient received 325 mg ASA and 75 mg Plavix in the ED. Hospital Medicine was contacted for admission.       Overview/Hospital Course:  No notes on file    Interval History:No events overnight.     Review of Systems  Objective:     Vital Signs (Most Recent):  Temp: 98 °F (36.7 °C) (07/28/20 0827)  Pulse: 72 (07/28/20 1103)  Resp: 16 (07/28/20 0827)  BP: (!) 142/81 (07/28/20 1012)  SpO2: 96 % (07/28/20 0827) Vital Signs (24h Range):  Temp:  [97.2 °F (36.2 °C)-98.6 °F (37 °C)] 98 °F (36.7 °C)  Pulse:  [65-91] 72  Resp:  [14-20] 16  SpO2:  [96 %-99 %] 96 %  BP: (142-200)/() 142/81     Weight: 88.5 kg (195 lb)  Body mass index is 25.04 kg/m².    Intake/Output Summary (Last 24 hours) at 7/28/2020 1156  Last data filed at 7/28/2020 0410  Gross per 24 hour   Intake --   Output 250 ml   Net -250 ml      Physical Exam  Constitutional:       General: He is awake. He is not in acute distress.     Appearance: Normal appearance. He is well-developed. He is not diaphoretic.   HENT:      Head: Normocephalic and atraumatic.   Eyes:      Conjunctiva/sclera: Conjunctivae normal.   Neck:      Musculoskeletal: Normal range of motion and neck supple.      Vascular: Normal carotid pulses. No carotid bruit.   Cardiovascular:      Rate and Rhythm: Normal rate and regular rhythm.      Heart sounds: S1 normal and S2 normal. No murmur.   Pulmonary:      Effort: Pulmonary effort is normal. No tachypnea.      Breath sounds: Normal breath sounds and air entry. No wheezing, rhonchi or rales.   Abdominal:      General: Bowel sounds are normal. There is no distension.      Palpations: Abdomen is soft.      Tenderness: There is no abdominal tenderness.   Musculoskeletal: Normal range of motion.   Skin:     General: Skin is warm and dry.      Findings: No erythema or rash.   Neurological:      General: No focal deficit present.      Mental Status: He is alert and oriented  to person, place, and time.      GCS: GCS eye subscore is 4. GCS verbal subscore is 5. GCS motor subscore is 6.      Cranial Nerves: Cranial nerves are intact.      Sensory: Sensation is intact.      Motor: Motor function is intact.      Coordination: Coordination is intact.      Comments: Only mild weakness to LLE.   Psychiatric:         Mood and Affect: Mood and affect normal.         Speech: Speech normal.         Behavior: Behavior normal. Behavior is cooperative.         Cognition and Memory: Memory normal.      Significant Labs: All pertinent labs within the past 24 hours have been reviewed.    Significant Imaging: I have reviewed and interpreted all pertinent imaging results/findings within the past 24 hours.      Assessment/Plan:      * Cerebrovascular accident (CVA) due to thrombosis of left vertebral artery  - CT head showed Left Vertebral artery stenosis ,however MRI no acute stroke.   - Neurology recommends patient to be seen by Interventional Neurologist as outpatient  - On aspirin,plavix, Lipitor    - PT/OT recommends SNIF but patient wants to go home with home health       Hypercholesteremia  - Lipitor 40 mg     Essential hypertension  - Elevated Blood pressures.Will add HCTZ to his current regimen and stop Hydralazine       VTE Risk Mitigation (From admission, onward)         Ordered     enoxaparin injection 40 mg  Every 24 hours      07/27/20 1932     IP VTE HIGH RISK PATIENT  Once      07/27/20 1932     Place sequential compression device  Until discontinued      07/27/20 1932                      Bin Duenas MD  Department of Hospital Medicine   [facility]

## 2020-07-28 NOTE — ASSESSMENT & PLAN NOTE
- Vascular Neurology reviewed CTA of head/neck and felt findings were chronic.  - Will obtain MRI of brain.  - 2D echo in AM.  - Will give additional Plavix to equal 300 mg loading dose, then continue 75 mg daily.  - Patient tolerated ASA in the ED despite being documented as an allergy.  Continue 81 mg ASA daily.  - Add Lipitor 40 mg daily.  - Neuro checks.  - PT/OT/ST consult to eval and treat.  - Neurology consult.

## 2020-07-28 NOTE — PT/OT/SLP EVAL
Speech Language Pathology Evaluation  Cognitive/Bedside Swallow    Patient Name:  Chinmay Mei   MRN:  [id number]  Admitting Diagnosis: Cerebrovascular accident (CVA) due to thrombosis of left vertebral artery    Recommendations:                  General Recommendations:  Follow-up not indicated  Diet recommendations:  Regular, Thin   Aspiration Precautions: HOB to 90 degrees and Remain upright 30 minutes post meal   General Precautions: Standard, fall  Communication strategies:  none    History:     Past Medical History:   Diagnosis Date    Acute ischemic stroke 7/27/2020    Arthritis     Hyperlipidemia     Hypertension     Open angle with borderline findings, low risk, 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  He is awake, alert, and cooperative with no family present.    Patient goals: none stated      Pain/Comfort:  · Pain Rating 1: 0/10    Objective:     Cognitive Status:    WFL      Receptive Language:   Comprehension:      WFL    Pragmatics:    WFL    Expressive Language:  Verbal:    WFL      Motor Speech:  WFL    Voice:   WFL    Visual-Spatial:  WFL    Oral Musculature Evaluation  · Oral Musculature: WFL  · Dentition: present and adequate    Bedside Swallow Eval:   Consistencies Assessed:  · Thin liquids, puree, and solids      Oral Phase:   · WFL    Pharyngeal Phase:   · no overt clinical signs/symptoms of aspiration  · no overt clinical signs/symptoms of pharyngeal dysphagia      Assessment:     Chinmay Mei is a 86 y.o. male with a diagnosis of CVA.  He presents with no acute speech or language deficits and presents with no dysphagia.  No further ST warranted at this time.      Plan:     · Plan of Care reviewed with:  patient   · SLP Follow-Up:  No         Time Tracking:      SLP Treatment Date:   07/28/20  Speech Start Time:  0930  Speech Stop Time:  1000     Speech Total Time (min):  30 min    Billable Minutes: Eval 15  and Eval Swallow and Oral Function 15    Sabrina Hall CCC-SLP  07/28/2020

## 2020-07-28 NOTE — ASSESSMENT & PLAN NOTE
# Acute Ischemic Stroke (clinical diagnosis) :  Symptoms ( heaviness in lower extremities ) Onset - 7/27/2020  at 10:00 AM , symptoms are still improving.  MRI brain is negative but brainstem tiny stroke can be missed with the MRI.  The patient has significant basilar artery and vertebral artery stenosis that can cause tiny brainstem stroke.    - NIHSS: 00 ( 7/28/2020  - Neuro Exam: WNL )    - Risk Factors: HTN, Dyslipidemia.     Localization: Likely ? Pontine with motor weakness only (basis pontis).     Etiology: Need to consider stenosis (Atheorosclerotic)    Recommended Workup / Evaluation:      - MRI Brain w/wo contrast. :  Diffuse cerebral atrophy with some white matter disease.  No acute stroke or hemorrhage seen.  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   - Telemetry for at least 24 hours to monitor for arrhthymias.       - TTE : Pending   - Fasting labs in AM -   LFTs: WNL  HbA1c: 5.5  Lipids: LDL > 150    Treatment:      - Neuro checks per Stroke/TIA protocol.       - BP Goals: Normotensive (Normal BP)    - Start DAPT (Dual antiplatelet therapy - Aspirin 81 mg and Plavix 75 mg daily) for 3 months followed by aspirin 81 mg alone daily.   - Lipid lowering: Atorvastatin 40 mg Daily  - Smoking cessation: No Smoking     - Physical Therapy / Occupational Therapy for rehabilitation.     - Recommended to follow up with primary care physician for risk factors modification  - better control of hypertension and dyslipidemia.     - Recommended to get evaluated by interventional neurosurgeon or interventional Neurology for possible angiography and angioplasty for vertebral artery and basilar artery stenosis.  I do not see any acute blockage.  It can be addressed as outpatient within 2-4 weeks.  Patient is educated to maintain his hydration and blood pressure within normal range.  Low blood pressure can also cause ischemic stroke in the distribution of stenosed vessels.   Patient understood and agreed with the recommendation.    - In addition, patient also needed to be evaluated by General Neurology as outpatient for evaluation of parkinsonism.  Patient have been dealing with progressive worsening gait with stiffness in his upper extremities as well as hypokinesia.  I do not see any resting tremors.  This can be addressed as outpatient once patient is out of this acute phase.     Neurology will sign off.  Please call us if there is any question.  Plan discussed with primary team, patient and his family.  Please call us if there is abnormal findings on echocardiogram.      Sheikh ALEENA Jimenez MD  Neurology, Neuro-Ophthalmology.    [place], [place]

## 2020-07-29 LAB
ALBUMIN SERPL BCP-MCNC: 4.2 G/DL (ref 3.5–5.2)
ALP SERPL-CCNC: 89 U/L (ref 55–135)
ALT SERPL W/O P-5'-P-CCNC: 20 U/L (ref 10–44)
ANION GAP SERPL CALC-SCNC: 16 MMOL/L (ref 8–16)
AST SERPL-CCNC: 41 U/L (ref 10–40)
BASOPHILS # BLD AUTO: 0.02 K/UL (ref 0–0.2)
BASOPHILS NFR BLD: 0.3 % (ref 0–1.9)
BILIRUB SERPL-MCNC: 1.8 MG/DL (ref 0.1–1)
BUN SERPL-MCNC: 16 MG/DL (ref 8–23)
CALCIUM SERPL-MCNC: 9.8 MG/DL (ref 8.7–10.5)
CHLORIDE SERPL-SCNC: 95 MMOL/L (ref 95–110)
CO2 SERPL-SCNC: 23 MMOL/L (ref 23–29)
CREAT SERPL-MCNC: 1.1 MG/DL (ref 0.5–1.4)
DIFFERENTIAL METHOD: ABNORMAL
EOSINOPHIL # BLD AUTO: 0 K/UL (ref 0–0.5)
EOSINOPHIL NFR BLD: 0.4 % (ref 0–8)
ERYTHROCYTE [DISTWIDTH] IN BLOOD BY AUTOMATED COUNT: 15.2 % (ref 11.5–14.5)
EST. GFR  (AFRICAN AMERICAN): >60 ML/MIN/1.73 M^2
EST. GFR  (NON AFRICAN AMERICAN): >60 ML/MIN/1.73 M^2
GLUCOSE SERPL-MCNC: 93 MG/DL (ref 70–110)
HCT VFR BLD AUTO: 48.1 % (ref 40–54)
HGB BLD-MCNC: 15.2 G/DL (ref 14–18)
IMM GRANULOCYTES # BLD AUTO: 0.02 K/UL (ref 0–0.04)
IMM GRANULOCYTES NFR BLD AUTO: 0.3 % (ref 0–0.5)
LYMPHOCYTES # BLD AUTO: 2.2 K/UL (ref 1–4.8)
LYMPHOCYTES NFR BLD: 32 % (ref 18–48)
MCH RBC QN AUTO: 26.6 PG (ref 27–31)
MCHC RBC AUTO-ENTMCNC: 31.6 G/DL (ref 32–36)
MCV RBC AUTO: 84 FL (ref 82–98)
MONOCYTES # BLD AUTO: 0.8 K/UL (ref 0.3–1)
MONOCYTES NFR BLD: 12.1 % (ref 4–15)
NEUTROPHILS # BLD AUTO: 3.8 K/UL (ref 1.8–7.7)
NEUTROPHILS NFR BLD: 54.9 % (ref 38–73)
NRBC BLD-RTO: 0 /100 WBC
PLATELET # BLD AUTO: 286 K/UL (ref 150–350)
PMV BLD AUTO: 10.6 FL (ref 9.2–12.9)
POTASSIUM SERPL-SCNC: 3.4 MMOL/L (ref 3.5–5.1)
PROT SERPL-MCNC: 8 G/DL (ref 6–8.4)
RBC # BLD AUTO: 5.71 M/UL (ref 4.6–6.2)
SODIUM SERPL-SCNC: 134 MMOL/L (ref 136–145)
WBC # BLD AUTO: 6.87 K/UL (ref 3.9–12.7)

## 2020-07-29 PROCEDURE — 85025 COMPLETE CBC W/AUTO DIFF WBC: CPT | Mod: HCNC

## 2020-07-29 PROCEDURE — 25000003 PHARM REV CODE 250: Mod: HCNC | Performed by: HOSPITALIST

## 2020-07-29 PROCEDURE — 97110 THERAPEUTIC EXERCISES: CPT | Mod: HCNC

## 2020-07-29 PROCEDURE — 80053 COMPREHEN METABOLIC PANEL: CPT | Mod: HCNC

## 2020-07-29 PROCEDURE — 63600175 PHARM REV CODE 636 W HCPCS: Mod: HCNC | Performed by: NURSE PRACTITIONER

## 2020-07-29 PROCEDURE — 25000003 PHARM REV CODE 250: Mod: HCNC | Performed by: NURSE PRACTITIONER

## 2020-07-29 PROCEDURE — 36415 COLL VENOUS BLD VENIPUNCTURE: CPT | Mod: HCNC

## 2020-07-29 PROCEDURE — 94761 N-INVAS EAR/PLS OXIMETRY MLT: CPT | Mod: HCNC

## 2020-07-29 PROCEDURE — 97116 GAIT TRAINING THERAPY: CPT | Mod: HCNC

## 2020-07-29 PROCEDURE — 21400001 HC TELEMETRY ROOM: Mod: HCNC

## 2020-07-29 RX ORDER — POLYETHYLENE GLYCOL 3350 17 G/17G
17 POWDER, FOR SOLUTION ORAL DAILY
Status: DISCONTINUED | OUTPATIENT
Start: 2020-07-29 | End: 2020-07-30 | Stop reason: HOSPADM

## 2020-07-29 RX ADMIN — DONEPEZIL HYDROCHLORIDE 10 MG: 5 TABLET, FILM COATED ORAL at 08:07

## 2020-07-29 RX ADMIN — AMLODIPINE BESYLATE 5 MG: 5 TABLET ORAL at 08:07

## 2020-07-29 RX ADMIN — ENOXAPARIN SODIUM 40 MG: 40 INJECTION SUBCUTANEOUS at 05:07

## 2020-07-29 RX ADMIN — ATORVASTATIN CALCIUM 40 MG: 40 TABLET, FILM COATED ORAL at 08:07

## 2020-07-29 RX ADMIN — CLOPIDOGREL 75 MG: 75 TABLET, FILM COATED ORAL at 08:07

## 2020-07-29 RX ADMIN — PANTOPRAZOLE SODIUM 40 MG: 40 TABLET, DELAYED RELEASE ORAL at 08:07

## 2020-07-29 RX ADMIN — LOSARTAN POTASSIUM 100 MG: 50 TABLET, FILM COATED ORAL at 08:07

## 2020-07-29 RX ADMIN — HYDROCHLOROTHIAZIDE 25 MG: 25 TABLET ORAL at 08:07

## 2020-07-29 RX ADMIN — ASPIRIN 81 MG: 81 TABLET, COATED ORAL at 08:07

## 2020-07-29 RX ADMIN — POLYETHYLENE GLYCOL 3350 17 G: 17 POWDER, FOR SOLUTION ORAL at 12:07

## 2020-07-29 NOTE — PLAN OF CARE
P.T. TX. COMPLETE THIS AM, PT CURRENTLY REQUIRES SBA FOR BED MOBILITY, LALO FOR TF'S, MODA FOR GAIT 5' WITH RW, PT UNABLE TO TOLERATE FURTHER DISTANCE, PT UNSTEADY, BLE WEAK AND SHAKING, LIMITED BY DIZZINESS IN SITTING AND STANDING, PT IS HIGH RISK FOR FALL, P.T. RECOMMENDS SNF

## 2020-07-29 NOTE — PLAN OF CARE
Plan of care reviewed with patient at bedside; verbalized understanding.  Pt is AAOX4 and VSS  Pt remained afebrile throughout this shift.   Pt denies pain this shift.  Pt moving/turning independent. Frequent weight shifting encouraged.  Bed low, side rails up x 2, wheels locked, call light in reach.  PIV intact. Cardiac monitor in place  No c/o or questions at this time.   Will continue to monitor.

## 2020-07-29 NOTE — PROGRESS NOTES
"[facility] Hospital Medicine  Progress Note    Patient Name: Chinmay Mei  MRN: [id number]  Patient Class: OP- Observation   Admission Date: 7/27/2020  Length of Stay: 0 days  Attending Physician: Bin Duenas MD  Primary Care Provider: Luis E Leach MD        Subjective:     Principal Problem:Cerebrovascular accident (CVA) due to thrombosis of left vertebral artery        HPI:  Chinmay Mei is a 86 y.o. male patient with a PMHx of arthritis, HLD, and HTN who presented to the Emergency Department with c/o dizziness which onset gradually about 6 hours PTA. No mitigating or exacerbating factors reported. He describes the dizziness as "feeling off balance". Associated mild LLE weakness. He reports leaning to the left side when trying to ambulate causing him to fall multiple times PTA. Patient denies any HA, visual disturbance, syncope, confusion, CP, SOB, ABD pain, N/V/D, back pain, neck pain, facial droop, slurred speech, fever or chills. CT of the head showed no acute findings, chronic lacunar infarct anterior limb of right internal capsule, and mild to moderate changes of chronic microvascular ischemia involving the bifrontal lobe white matter. Vascular Neurology consulted in the ED via TeleStroke, and recommended further imaging to r/o occlusion needing intervention. CTA head and neck showed a short segment occlusion involving the distal left vertebral artery with some calcified plaque present suggesting underlying stenosis, however, there is apparent short segment of thrombus which appears acute within this distal left vertebral artery with some distal reconstitution; distally there is good filling of the basilar artery, though there is a moderate area of stenosis in the mid to distal basilar artery; no acute thrombi are noted intracranially in the posterior circulation; no acute thromboembolic disease in the anterior circulation; no significant carotid atherosclerosis is evident. " Vascular Neurology reviewed CTA results and felt findings appear chronic. Patient received 325 mg ASA and 75 mg Plavix in the ED. Hospital Medicine was contacted for admission.       Overview/Hospital Course:  No notes on file    Interval History: No events overnight     Review of Systems  Objective:     Vital Signs (Most Recent):  Temp: 96.4 °F (35.8 °C) (07/29/20 1116)  Pulse: 80 (07/29/20 1116)  Resp: 18 (07/29/20 1116)  BP: 126/72 (07/29/20 1116)  SpO2: 96 % (07/29/20 1116) Vital Signs (24h Range):  Temp:  [96.4 °F (35.8 °C)-98.3 °F (36.8 °C)] 96.4 °F (35.8 °C)  Pulse:  [] 80  Resp:  [16-19] 18  SpO2:  [93 %-98 %] 96 %  BP: (126-183)/(65-95) 126/72     Weight: 90 kg (198 lb 6.6 oz)  Body mass index is 25.47 kg/m².    Intake/Output Summary (Last 24 hours) at 7/29/2020 1159  Last data filed at 7/29/2020 0425  Gross per 24 hour   Intake 720 ml   Output --   Net 720 ml      Physical Exam   General: He is awake. He is not in acute distress.     Appearance: Normal appearance. He is well-developed. He is not diaphoretic.   HENT:      Head: Normocephalic and atraumatic.   Eyes:      Conjunctiva/sclera: Conjunctivae normal.   Neck:      Musculoskeletal: Normal range of motion and neck supple.      Vascular: Normal carotid pulses. No carotid bruit.   Cardiovascular:      Rate and Rhythm: Normal rate and regular rhythm.      Heart sounds: S1 normal and S2 normal. No murmur.   Pulmonary:      Effort: Pulmonary effort is normal. No tachypnea.      Breath sounds: Normal breath sounds and air entry. No wheezing, rhonchi or rales.   Abdominal:      General: Bowel sounds are normal. There is no distension.      Palpations: Abdomen is soft.      Tenderness: There is no abdominal tenderness.   Musculoskeletal: Normal range of motion.   Skin:     General: Skin is warm and dry.      Findings: No erythema or rash.   Neurological:      General: No focal deficit present.      Mental Status: He is alert and oriented to person,  place, and time.      GCS: GCS eye subscore is 4. GCS verbal subscore is 5. GCS motor subscore is 6.      Cranial Nerves: Cranial nerves are intact.      Sensory: Sensation is intact.      Motor: Motor function is intact.      Coordination: Coordination is intact.      Comments: Only mild weakness to LLE.   Psychiatric:         Mood and Affect: Mood and affect normal.         Speech: Speech normal.         Behavior: Behavior normal. Behavior is cooperative.         Cognition and Memory: Memory normal.      Significant Labs: All pertinent labs within the past 24 hours have been reviewed.    Significant Imaging: I have reviewed and interpreted all pertinent imaging results/findings within the past 24 hours.      Assessment/Plan:      * Cerebrovascular accident (CVA) due to thrombosis of left vertebral artery   - CT head showed Left Vertebral artery stenosis ,however MRI no acute stroke.   - Neurology recommends patient to be seen by Interventional Neurologist as outpatient  - On aspirin,plavix, Lipitor    - PT/OT recommends SNIF   - CM consulted for SNIF placement     Hypercholesteremia   Lipitor     Essential hypertension  - Continue home amlodipine, hydralazine, and losartan.      VTE Risk Mitigation (From admission, onward)         Ordered     enoxaparin injection 40 mg  Every 24 hours      07/27/20 1932     IP VTE HIGH RISK PATIENT  Once      07/27/20 1932     Place sequential compression device  Until discontinued      07/27/20 1932                      Bin Duenas MD  Department of Hospital Medicine   [facility]

## 2020-07-29 NOTE — PLAN OF CARE
CM spoke to patients daughter (Deepali) as well as 2 other family members that were merged on to the phone call by Deepali.  They do not have family at this time that can help patient at home and would like CM to seek SNF placement.  They do not want a nursing home because of COVID.  They did receive the SNF list and would like for CM to find out if [facility] has a SNF bed.  Choice form completed and witnessed.  CM will make a referral via [facility] care.

## 2020-07-29 NOTE — SUBJECTIVE & OBJECTIVE
Interval History: No events overnight     Review of Systems  Objective:     Vital Signs (Most Recent):  Temp: 96.4 °F (35.8 °C) (07/29/20 1116)  Pulse: 80 (07/29/20 1116)  Resp: 18 (07/29/20 1116)  BP: 126/72 (07/29/20 1116)  SpO2: 96 % (07/29/20 1116) Vital Signs (24h Range):  Temp:  [96.4 °F (35.8 °C)-98.3 °F (36.8 °C)] 96.4 °F (35.8 °C)  Pulse:  [] 80  Resp:  [16-19] 18  SpO2:  [93 %-98 %] 96 %  BP: (126-183)/(65-95) 126/72     Weight: 90 kg (198 lb 6.6 oz)  Body mass index is 25.47 kg/m².    Intake/Output Summary (Last 24 hours) at 7/29/2020 1159  Last data filed at 7/29/2020 0425  Gross per 24 hour   Intake 720 ml   Output --   Net 720 ml      Physical Exam   General: He is awake. He is not in acute distress.     Appearance: Normal appearance. He is well-developed. He is not diaphoretic.   HENT:      Head: Normocephalic and atraumatic.   Eyes:      Conjunctiva/sclera: Conjunctivae normal.   Neck:      Musculoskeletal: Normal range of motion and neck supple.      Vascular: Normal carotid pulses. No carotid bruit.   Cardiovascular:      Rate and Rhythm: Normal rate and regular rhythm.      Heart sounds: S1 normal and S2 normal. No murmur.   Pulmonary:      Effort: Pulmonary effort is normal. No tachypnea.      Breath sounds: Normal breath sounds and air entry. No wheezing, rhonchi or rales.   Abdominal:      General: Bowel sounds are normal. There is no distension.      Palpations: Abdomen is soft.      Tenderness: There is no abdominal tenderness.   Musculoskeletal: Normal range of motion.   Skin:     General: Skin is warm and dry.      Findings: No erythema or rash.   Neurological:      General: No focal deficit present.      Mental Status: He is alert and oriented to person, place, and time.      GCS: GCS eye subscore is 4. GCS verbal subscore is 5. GCS motor subscore is 6.      Cranial Nerves: Cranial nerves are intact.      Sensory: Sensation is intact.      Motor: Motor function is intact.       Coordination: Coordination is intact.      Comments: Only mild weakness to LLE.   Psychiatric:         Mood and Affect: Mood and affect normal.         Speech: Speech normal.         Behavior: Behavior normal. Behavior is cooperative.         Cognition and Memory: Memory normal.      Significant Labs: All pertinent labs within the past 24 hours have been reviewed.    Significant Imaging: I have reviewed and interpreted all pertinent imaging results/findings within the past 24 hours.

## 2020-07-29 NOTE — PT/OT/SLP PROGRESS
Physical Therapy  Treatment    Chinmay Mei   MRN: [id number]   Admitting Diagnosis: Cerebrovascular accident (CVA) due to thrombosis of left vertebral artery    PT Received On: 07/29/20  PT Start Time: 0900     PT Stop Time: 0925    PT Total Time (min): 25 min       Billable Minutes:  Gait Training 15 and Therapeutic Exercise 10    Treatment Type: Treatment  PT/PTA: PT     PTA Visit Number: 0       General Precautions: Standard, fall  Orthopedic Precautions: N/A   Braces: N/A    Subjective:  Communicated with NURSE WILSON prior to session.  Pain/Comfort  Pain Rating 1: 0/10    Objective:   Patient found with: bed alarm, telemetry, peripheral IV    Functional Mobility:  Therapeutic Activities and Exercises:  PT FOUND SUPINE IN BED UPON ARRIVAL, AGREEABLE TO TX., C/O DIZZINESS IN SITTING AND STANDING, SUP>SIT WITH SBA, SEATED SCOOT TO EOB WITH SBA, SIT>STAND WITH LALO, REVIEW RW USE AND SAFETY DURING TF'S AND GAIT, PT STOOD TO GET BEARINGS DUE TO DIZZINESS, PT AMB 5' WITH RW AND MOD/LALO, UNSTEADY DUE TO BLE WEAKNESS/SHAKING, CHAIR IN TOW FOR SAFETY, PT UNABLE TO TOLERATE FURTHER DISTANCE, REST GIVEN AS NEEDED BUT PT STILL C/O DIZZINESS SO UNABLE TO TOLERATE FURTHER DISTANCE, PT EDUCATED IN AND PERFORMED BLE THEREX X 20 REPS AROM WITH REST, PT ENCOURAGED TO INCREASE TIME OOB IN CHAIR    AM-PAC 6 CLICK MOBILITY  How much help from another person does this patient currently need?   1 = Unable, Total/Dependent Assistance  2 = A lot, Maximum/Moderate Assistance  3 = A little, Minimum/Contact Guard/Supervision  4 = None, Modified [place]/Independent    Turning over in bed (including adjusting bedclothes, sheets and blankets)?: 4  Sitting down on and standing up from a chair with arms (e.g., wheelchair, bedside commode, etc.): 3  Moving from lying on back to sitting on the side of the bed?: 4  Moving to and from a bed to a chair (including a wheelchair)?: 2  Need to walk in hospital room?: 2  Climbing 3-5 steps with a  railing?: 1  Basic Mobility Total Score: 16    AM-PAC Raw Score CMS G-Code Modifier Level of Impairment Assistance   6 % Total / Unable   7 - 9 CM 80 - 100% Maximal Assist   10 - 14 CL 60 - 80% Moderate Assist   15 - 19 CK 40 - 60% Moderate Assist   20 - 22 CJ 20 - 40% Minimal Assist   23 CI 1-20% SBA / CGA   24 CH 0% Independent/ Mod I     Patient left up in chair with all lines intact, call button in reach, chair alarm on and NURSE notified.    Assessment:  Chinmay Mei is a 86 y.o. male with a medical diagnosis of Cerebrovascular accident (CVA) due to thrombosis of left vertebral artery and presents with IMPAIRED FUNCTIONAL MOBILITY. PT WILL BENEFIT FROM CONT. SKILLED P.T. TO ADDRESS IMPAIRMENTS    Rehab identified problem list/impairments: Rehab identified problem list/impairments: weakness, impaired endurance, impaired balance, gait instability, impaired functional mobilty    Rehab potential is good.    Activity tolerance: Good    Discharge recommendations: Discharge Facility/Level of Care Needs: nursing facility, skilled     Barriers to discharge:      Equipment recommendations: Equipment Needed After Discharge: walker, rolling, bath bench     GOALS:   Multidisciplinary Problems     Physical Therapy Goals        Problem: Physical Therapy Goal    Goal Priority Disciplines Outcome Goal Variances Interventions   Physical Therapy Goal     PT, PT/OT Ongoing, Progressing     Description: LTG'S TO BE MET IN 7 DAYS (8-4-20)  1. PT WILL REQUIRE SBA FOR BED MOBILITY  2. PT WILL REQUIRE SBA FOR TF'S  3. PT WILL ' WITH RW AND SBA                   PLAN:    Patient to be seen 5 x/week  to address the above listed problems via gait training, therapeutic activities, therapeutic exercises  Plan of Care expires: 08/04/20  Plan of Care reviewed with: patient      Olga Cruz, PT  07/29/2020

## 2020-07-29 NOTE — PLAN OF CARE
Pt AAO x4.  Pt remains free of falls throughout shift.  Pt denies pain throughout shift.  Plan of care reviewed. Pt verbalizes understanding.  Pt moving and turning independently. Frequent weight shifting encouraged.  Sat in chair comfortably all morning.  Normal sinus rhythm on monitor.  Hourly rounding completed.  Will continue to monitor.

## 2020-07-30 VITALS
HEART RATE: 75 BPM | BODY MASS INDEX: 25.12 KG/M2 | SYSTOLIC BLOOD PRESSURE: 148 MMHG | WEIGHT: 195.75 LBS | HEIGHT: 74 IN | DIASTOLIC BLOOD PRESSURE: 76 MMHG | OXYGEN SATURATION: 98 % | RESPIRATION RATE: 18 BRPM | TEMPERATURE: 98 F

## 2020-07-30 PROCEDURE — 97110 THERAPEUTIC EXERCISES: CPT | Mod: HCNC

## 2020-07-30 PROCEDURE — 97110 THERAPEUTIC EXERCISES: CPT | Mod: HCNC | Performed by: PHYSICAL THERAPIST

## 2020-07-30 PROCEDURE — 94761 N-INVAS EAR/PLS OXIMETRY MLT: CPT | Mod: HCNC

## 2020-07-30 PROCEDURE — 25000003 PHARM REV CODE 250: Mod: HCNC | Performed by: HOSPITALIST

## 2020-07-30 PROCEDURE — 25000003 PHARM REV CODE 250: Mod: HCNC | Performed by: NURSE PRACTITIONER

## 2020-07-30 PROCEDURE — 97530 THERAPEUTIC ACTIVITIES: CPT | Mod: HCNC | Performed by: PHYSICAL THERAPIST

## 2020-07-30 PROCEDURE — 97116 GAIT TRAINING THERAPY: CPT | Mod: HCNC

## 2020-07-30 RX ORDER — CLOPIDOGREL BISULFATE 75 MG/1
75 TABLET ORAL DAILY
Qty: 30 TABLET | Refills: 11 | Status: SHIPPED | OUTPATIENT
Start: 2020-07-31 | End: 2020-07-30

## 2020-07-30 RX ORDER — ATORVASTATIN CALCIUM 40 MG/1
40 TABLET, FILM COATED ORAL NIGHTLY
Qty: 90 TABLET | Refills: 3 | Status: SHIPPED | OUTPATIENT
Start: 2020-07-30 | End: 2020-11-19 | Stop reason: SDUPTHER

## 2020-07-30 RX ORDER — CLOPIDOGREL BISULFATE 75 MG/1
75 TABLET ORAL DAILY
Qty: 90 TABLET | Refills: 3 | Status: SHIPPED | OUTPATIENT
Start: 2020-07-31 | End: 2020-11-19 | Stop reason: SDUPTHER

## 2020-07-30 RX ORDER — ASPIRIN 81 MG/1
81 TABLET ORAL DAILY
Qty: 30 TABLET | Refills: 0 | Status: SHIPPED | OUTPATIENT
Start: 2020-07-31 | End: 2024-03-08

## 2020-07-30 RX ADMIN — ASPIRIN 81 MG: 81 TABLET, COATED ORAL at 10:07

## 2020-07-30 RX ADMIN — LOSARTAN POTASSIUM 100 MG: 50 TABLET, FILM COATED ORAL at 10:07

## 2020-07-30 RX ADMIN — PANTOPRAZOLE SODIUM 40 MG: 40 TABLET, DELAYED RELEASE ORAL at 10:07

## 2020-07-30 RX ADMIN — AMLODIPINE BESYLATE 5 MG: 5 TABLET ORAL at 10:07

## 2020-07-30 RX ADMIN — CLOPIDOGREL 75 MG: 75 TABLET, FILM COATED ORAL at 10:07

## 2020-07-30 RX ADMIN — HYDROCHLOROTHIAZIDE 25 MG: 25 TABLET ORAL at 10:07

## 2020-07-30 RX ADMIN — POLYETHYLENE GLYCOL 3350 17 G: 17 POWDER, FOR SOLUTION ORAL at 10:07

## 2020-07-30 NOTE — PLAN OF CARE
Spoke to Mana from [facility]. Number to call report is [phone]. Pickup time 4-4:30.    Roge Holt LMSW 7/30/2020 3:31 PM

## 2020-07-30 NOTE — NURSING
Report called to [facility], report given to Swati. Will remove peripheral IVs and heart monitor prior to discharge.

## 2020-07-30 NOTE — HOSPITAL COURSE
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 PT/OT recommended SNIF and he will be discharged to [place] today. Patient will be on Aspirin ,plavix for 3 months and thereafter he will be on only aspirin. He needs to follow up with Interventional Neurology for further interventions on vertebral and  basilar artery stenosis.

## 2020-07-30 NOTE — PLAN OF CARE
07/30/20 1532   Post-Acute Status   Post-Acute Authorization Placement   Post-Acute Placement Status Set-up Complete   Patient choice form signed by patient/caregiver List with quality metrics by geographic area provided   Discharge Delays None known at this time   Discharge Plan   Discharge Plan A Skilled Nursing Facility   Discharge Plan B Skilled Nursing Facility     Pt to DC to [facility]. Pickup time between 4 and 4:30 this evening.    Roge Holt LMSW 7/30/2020 3:33 PM

## 2020-07-30 NOTE — PT/OT/SLP PROGRESS
Physical Therapy  Treatment    Chinmay Mei   MRN: [id number]   Admitting Diagnosis: Cerebrovascular accident (CVA) due to thrombosis of left vertebral artery    PT Received On: 07/30/20  PT Start Time: 0945     PT Stop Time: 1010    PT Total Time (min): 25 min       Billable Minutes:  Gait Training 15 and Therapeutic Exercise 10    Treatment Type: Treatment  PT/PTA: PT     PTA Visit Number: 0       General Precautions: Standard, fall  Orthopedic Precautions: N/A   Braces: N/A    Subjective:  Communicated with NURSE NEIL prior to session.  Pain/Comfort  Pain Rating 1: 0/10    Objective:   Patient found with: bed alarm, peripheral IV, telemetry    Functional Mobility:  Therapeutic Activities and Exercises:  PT FOUND SUPINE IN BED UPON ARRIVAL, AGREEABLE TO P.T. TX, REPORTS NO DIZZINESS TODAY, SUP>SIT WITH SBA, SEATED SCOOT TO EOB WITH SBA, REVIEW RW USE AND SAFETY DURING TF'S AND GAIT, SIT>STAND WITH LALO, PT AMB 45' X 2 TRIALS WITH RW AND LALO, SLOW PACE, UNSTEADY WITH INCREASED LATERAL SWAY SUSANNAH. TOWARD R SIDE, CUES FOR UPRIGHT POSTURE AND ATTENTION TO TASK, PT RETURN TO ROOM TO BEDSIDE CHAIR WITH LALO, PT EDUCATED IN AND PERFORMED BLE THEREX X 15 REPS AROM WITH REST, PT LEFT SEATED WITH ALL NEEDS MET    AM-PAC 6 CLICK MOBILITY  How much help from another person does this patient currently need?   1 = Unable, Total/Dependent Assistance  2 = A lot, Maximum/Moderate Assistance  3 = A little, Minimum/Contact Guard/Supervision  4 = None, Modified [place]/Independent    Turning over in bed (including adjusting bedclothes, sheets and blankets)?: 4  Sitting down on and standing up from a chair with arms (e.g., wheelchair, bedside commode, etc.): 3  Moving from lying on back to sitting on the side of the bed?: 4  Moving to and from a bed to a chair (including a wheelchair)?: 3  Need to walk in hospital room?: 3  Climbing 3-5 steps with a railing?: 1  Basic Mobility Total Score: 18    AM-PAC Raw Score CMS G-Code  Modifier Level of Impairment Assistance   6 % Total / Unable   7 - 9 CM 80 - 100% Maximal Assist   10 - 14 CL 60 - 80% Moderate Assist   15 - 19 CK 40 - 60% Moderate Assist   20 - 22 CJ 20 - 40% Minimal Assist   23 CI 1-20% SBA / CGA   24 CH 0% Independent/ Mod I     Patient left up in chair with all lines intact, call button in reach, chair alarm on, NURSE notified and FAMILY present.    Assessment:  Chinmay Mei is a 86 y.o. male with a medical diagnosis of Cerebrovascular accident (CVA) due to thrombosis of left vertebral artery and presents with IMPAIRED FUNCTIONAL MOBILITY. PT WILL BENEFIT FROM CONT. SKILLED P.T. TO ADDRESS IMPAIRMENTS    Rehab identified problem list/impairments: Rehab identified problem list/impairments: weakness, impaired endurance, impaired balance, gait instability, impaired functional mobilty    Rehab potential is good.    Activity tolerance: Good    Discharge recommendations: Discharge Facility/Level of Care Needs: nursing facility, skilled     Barriers to discharge:      Equipment recommendations: Equipment Needed After Discharge: bath bench, walker, rolling     GOALS:   Multidisciplinary Problems     Physical Therapy Goals        Problem: Physical Therapy Goal    Goal Priority Disciplines Outcome Goal Variances Interventions   Physical Therapy Goal     PT, PT/OT Ongoing, Progressing     Description: LTG'S TO BE MET IN 7 DAYS (8-4-20)  1. PT WILL REQUIRE SBA FOR BED MOBILITY  2. PT WILL REQUIRE SBA FOR TF'S  3. PT WILL ' WITH RW AND SBA                   PLAN:    Patient to be seen 5 x/week  to address the above listed problems via gait training, therapeutic activities, therapeutic exercises  Plan of Care expires: 08/04/20  Plan of Care reviewed with: patient, family    Olga Arroyo, PT  07/30/2020

## 2020-07-30 NOTE — PLAN OF CARE
IMPROVED MOBILITY TODAY, SBA FOR BED MOBILITY, LALO FOR TF'S AND GAIT WITH RW, P.T. RECOMMENDS SNF

## 2020-07-30 NOTE — PT/OT/SLP PROGRESS
"Occupational Therapy  Treatment    Chinmay Mei   MRN: [id number]   Admitting Diagnosis: Cerebrovascular accident (CVA) due to thrombosis of left vertebral artery    OT Date of Treatment: 07/30/20   OT Start Time: 0945  OT Stop Time: 1010  OT Total Time (min): 25 min    Billable Minutes:  Therapeutic Activity 13 min and Therapeutic Exercise 12 min    General Precautions: Standard, fall  Orthopedic Precautions: N/A  Braces: N/A         Subjective:  Communicated with Nurse Martins and epic chart review prior to session.  Pt found supine in bed and agreeable to tx at this time.   Pain/Comfort  Pain Rating 1: 0/10    Objective:  Patient found with: bed alarm, telemetry, peripheral IV     Functional Mobility:  Therapeutic Activities and Exercises:  Pt performed supine >sit with SBA and extended time to complete, scooted to EOB with SBA. Pt sat EOB and performed (B) UE therapeutic exercises 1 x 15 reps: shoulder flex, chest press, and bicep curls. Pt performed sit>stand using RW with Min A, functional mobility using RW ~45ft x 2 with Min A, t/f to chair using RW with Min A.      AM-PAC 6 CLICK ADL   How much help from another person does this patient currently need?   1 = Unable, Total/Dependent Assistance  2 = A lot, Maximum/Moderate Assistance  3 = A little, Minimum/Contact Guard/Supervision  4 = None, Modified [place]/Independent    Putting on and taking off regular lower body clothing? : 3  Bathing (including washing, rinsing, drying)?: 3  Toileting, which includes using toilet, bedpan, or urinal? : 3  Putting on and taking off regular upper body clothing?: 3  Taking care of personal grooming such as brushing teeth?: 3  Eating meals?: 3  Daily Activity Total Score: 18     AM-PAC Raw Score CMS "G-Code Modifier Level of Impairment Assistance   6 % Total / Unable   7 - 8 CM 80 - 100% Maximal Assist   9-13 CL 60 - 80% Moderate Assist   14 - 19 CK 40 - 60% Moderate Assist   20 - 22 CJ 20 - 40% Minimal Assist   23 " CI 1-20% SBA / CGA   24 CH 0% Independent/ Mod I       Patient left up in chair with all lines intact, call button in reach, chair alarm on, Nurse Eliezer notified and wife present    ASSESSMENT:  Chinmay Mei is a 86 y.o. male with a medical diagnosis of Cerebrovascular accident (CVA) due to thrombosis of left vertebral artery and presents with impaired functional mobility and ADLs. Pt will benefit from continued skilled OT in order to address the listed impairments.     Rehab identified problem list/impairments: Rehab identified problem list/impairments: weakness, impaired self care skills, impaired functional mobilty, gait instability, impaired balance, decreased safety awareness    Rehab potential is fair.    Activity tolerance: Fair    Discharge recommendations: Discharge Facility/Level of Care Needs: nursing facility, skilled     Barriers to discharge: Barriers to Discharge: Decreased caregiver support    Equipment recommendations: walker, rolling, bath bench     GOALS:   Multidisciplinary Problems     Occupational Therapy Goals        Problem: Occupational Therapy Goal    Goal Priority Disciplines Outcome Interventions   Occupational Therapy Goal     OT, PT/OT Ongoing, Progressing    Description: LTGs to be met by 8/4/2020  1. Pt will perform LE dressing with Supervision  2. Pt will perform G/H tasks in standing at sink with Supervision  3. Pt will perform toilet t/fs with SBA  4. Pt will perform (B) UE therapeutic exercises 1 x 20 reps                   Plan:  Patient to be seen 3 x/week to address the above listed problems via self-care/home management, therapeutic activities, therapeutic exercises  Plan of Care expires: 08/04/20  Plan of Care reviewed with: patient         Eulalia Knott, PT/OT  07/30/2020

## 2020-07-30 NOTE — PLAN OF CARE
Dx: CVA     POC:No major deficits noted, neuro checks performed every 4 hours. Plan is to transition to rehab hospital on next shift. Bed remains low and locked, side rails up x 2, call bell and belongings within reach.   Clutter removed from room, lighting adjusted when rounding.   Frequent repositioning encouraged to prevent pressure injury.   Pain frequently monitored, interventions implemented as ordered and appropriate.   Cardiac monitor in place, normal sinus rhythm with PVCs.   Will continue to monitor.

## 2020-07-30 NOTE — DISCHARGE SUMMARY
"[facility] Hospital Medicine  Discharge Summary      Patient Name: Chinmay Mei  MRN: [id number]  Admission Date: 7/27/2020  Hospital Length of Stay: 1 days  Discharge Date and Time: No discharge date for patient encounter.  Attending Physician: Bin Duenas MD   Discharging Provider: Bin Duenas MD  Primary Care Provider: Luis E Leach MD      HPI:   Chinmay Mei is a 86 y.o. male patient with a PMHx of arthritis, HLD, and HTN who presented to the Emergency Department with c/o dizziness which onset gradually about 6 hours PTA. No mitigating or exacerbating factors reported. He describes the dizziness as "feeling off balance". Associated mild LLE weakness. He reports leaning to the left side when trying to ambulate causing him to fall multiple times PTA. Patient denies any HA, visual disturbance, syncope, confusion, CP, SOB, ABD pain, N/V/D, back pain, neck pain, facial droop, slurred speech, fever or chills. CT of the head showed no acute findings, chronic lacunar infarct anterior limb of right internal capsule, and mild to moderate changes of chronic microvascular ischemia involving the bifrontal lobe white matter. Vascular Neurology consulted in the ED via TeleStroke, and recommended further imaging to r/o occlusion needing intervention. CTA head and neck showed a short segment occlusion involving the distal left vertebral artery with some calcified plaque present suggesting underlying stenosis, however, there is apparent short segment of thrombus which appears acute within this distal left vertebral artery with some distal reconstitution; distally there is good filling of the basilar artery, though there is a moderate area of stenosis in the mid to distal basilar artery; no acute thrombi are noted intracranially in the posterior circulation; no acute thromboembolic disease in the anterior circulation; no significant carotid atherosclerosis is evident. Vascular Neurology " reviewed CTA results and felt findings appear chronic. Patient received 325 mg ASA and 75 mg Plavix in the ED. Hospital Medicine was contacted for admission.       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 PT/OT recommended SNIF and he will be discharged to [facility] today. Patient will be on Aspirin ,plavix for 3 months and thereafter he will be on only aspirin. He needs to follow up with Interventional Neurology for further interventions on vertebral and  basilar artery stenosis.      Consults:   Consults (From admission, onward)        Status Ordering Provider     Inpatient consult to Neurology  Once     Provider:  Sheikh ALEENA Jimenez MD    Completed MICKY JANG     Inpatient consult to Registered Dietitian/Nutritionist  Once     Provider:  (Not yet assigned)    Completed MICKY JANG     Inpatient consult to Social Work  Once     Provider:  (Not yet assigned)    Completed KANNAN MILLER     Inpatient consult to Telemedicine-Stroke  Once     Provider:  Angelo Acosta MD    Completed JAMIE SHIELDS     IP consult to case management/social work  Once     Provider:  (Not yet assigned)    Completed MICKY JANG.          No new Assessment & Plan notes have been filed under this hospital service since the last note was generated.  Service: Hospital Medicine    Final Active Diagnoses:    Diagnosis Date Noted POA    PRINCIPAL PROBLEM:  Cerebrovascular accident (CVA) due to thrombosis of left vertebral artery [I63.012] 07/28/2020 Yes    Dizziness [R42] 07/27/2020 Yes    Hypercholesteremia [E78.00] 05/06/2014 Yes     Chronic    Essential hypertension [I10] 04/28/2014 Yes     Chronic      Problems Resolved During this Admission:       Discharged Condition: stable    Disposition: Skilled Nursing Facility    Follow Up:  Follow-up Information     [facility].     Specialties: Rehabilitation, SNF Agency  Why: SNF  Contact information:  [address]  [place] [address]  [phone]                 Patient Instructions:      Ambulatory referral/consult to Neurology   Standing Status: Future   Referral Priority: Routine Referral Type: Consultation   Referral Reason: Specialty Services Required   Requested Specialty: Neurology   Number of Visits Requested: 1     Diet Adult Regular     Activity as tolerated       Significant Diagnostic Studies: Labs:   CMP   Recent Labs   Lab 07/29/20  0504   *   K 3.4*   CL 95   CO2 23   GLU 93   BUN 16   CREATININE 1.1   CALCIUM 9.8   PROT 8.0   ALBUMIN 4.2   BILITOT 1.8*   ALKPHOS 89   AST 41*   ALT 20   ANIONGAP 16   ESTGFRAFRICA >60   EGFRNONAA >60    and CBC   Recent Labs   Lab 07/29/20  0504   WBC 6.87   HGB 15.2   HCT 48.1          Pending Diagnostic Studies:     None         Medications:  Reconciled Home Medications:      Medication List      START taking these medications    aspirin 81 MG EC tablet  Commonly known as: ECOTRIN  Take 1 tablet (81 mg total) by mouth once daily.  Start taking on: July 31, 2020     atorvastatin 40 MG tablet  Commonly known as: LIPITOR  Take 1 tablet (40 mg total) by mouth every evening.     clopidogreL 75 mg tablet  Commonly known as: PLAVIX  Take 1 tablet (75 mg total) by mouth once daily.  Start taking on: July 31, 2020        CONTINUE taking these medications    amLODIPine 5 MG tablet  Commonly known as: NORVASC  Take 1 tablet (5 mg total) by mouth 2 (two) times daily.     donepeziL 10 MG tablet  Commonly known as: ARICEPT  Take 1 tablet (10 mg total) by mouth every evening.     hydrALAZINE 50 MG tablet  Commonly known as: APRESOLINE  Take 1 tablet (50 mg total) by mouth every 12 (twelve) hours.     hydroCHLOROthiazide 12.5 MG Tab  Commonly known as: HYDRODIURIL  Take 1 tablet (12.5 mg total) by mouth every 48 hours. for 3 doses     losartan 100 MG tablet  Commonly known as:  COZAAR  Take 1 tablet (100 mg total) by mouth once daily.        STOP taking these medications    acetaminophen 325 MG tablet  Commonly known as: TYLENOL            Indwelling Lines/Drains at time of discharge:   Lines/Drains/Airways     None                 Time spent on the discharge of patient: 35 minutes  Patient was seen and examined on the date of discharge and determined to be suitable for discharge.         Bin Duenas MD  Department of Hospital Medicine  [facility]

## 2020-07-30 NOTE — PROGRESS NOTES
"[facility] Hospital Medicine  Progress Note    Patient Name: Chinmay Mei  MRN: [id number]  Patient Class: IP- Inpatient   Admission Date: 7/27/2020  Length of Stay: 1 days  Attending Physician: Bin Duenas MD  Primary Care Provider: Luis E Leach MD        Subjective:     Principal Problem:Cerebrovascular accident (CVA) due to thrombosis of left vertebral artery        HPI:  Chinmay Mei is a 86 y.o. male patient with a PMHx of arthritis, HLD, and HTN who presented to the Emergency Department with c/o dizziness which onset gradually about 6 hours PTA. No mitigating or exacerbating factors reported. He describes the dizziness as "feeling off balance". Associated mild LLE weakness. He reports leaning to the left side when trying to ambulate causing him to fall multiple times PTA. Patient denies any HA, visual disturbance, syncope, confusion, CP, SOB, ABD pain, N/V/D, back pain, neck pain, facial droop, slurred speech, fever or chills. CT of the head showed no acute findings, chronic lacunar infarct anterior limb of right internal capsule, and mild to moderate changes of chronic microvascular ischemia involving the bifrontal lobe white matter. Vascular Neurology consulted in the ED via TeleStroke, and recommended further imaging to r/o occlusion needing intervention. CTA head and neck showed a short segment occlusion involving the distal left vertebral artery with some calcified plaque present suggesting underlying stenosis, however, there is apparent short segment of thrombus which appears acute within this distal left vertebral artery with some distal reconstitution; distally there is good filling of the basilar artery, though there is a moderate area of stenosis in the mid to distal basilar artery; no acute thrombi are noted intracranially in the posterior circulation; no acute thromboembolic disease in the anterior circulation; no significant carotid atherosclerosis is evident. " Vascular Neurology reviewed CTA results and felt findings appear chronic. Patient received 325 mg ASA and 75 mg Plavix in the ED. Hospital Medicine was contacted for admission.       Overview/Hospital Course:  No notes on file    Interval History: No events overnight     Review of Systems  Objective:     Vital Signs (Most Recent):  Temp: 96.6 °F (35.9 °C) (07/30/20 1149)  Pulse: 69 (07/30/20 1149)  Resp: 18 (07/30/20 1149)  BP: 120/69 (07/30/20 1149)  SpO2: 97 % (07/30/20 1149) Vital Signs (24h Range):  Temp:  [96.5 °F (35.8 °C)-98.6 °F (37 °C)] 96.6 °F (35.9 °C)  Pulse:  [48-77] 69  Resp:  [16-20] 18  SpO2:  [96 %-97 %] 97 %  BP: (120-175)/(56-86) 120/69     Weight: 88.8 kg (195 lb 12.3 oz)  Body mass index is 25.14 kg/m².    Intake/Output Summary (Last 24 hours) at 7/30/2020 1308  Last data filed at 7/29/2020 1700  Gross per 24 hour   Intake 480 ml   Output --   Net 480 ml      Physical Exam     General: He is awake. He is not in acute distress.     Appearance: Normal appearance. He is well-developed. He is not diaphoretic.   HENT:      Head: Normocephalic and atraumatic.   Eyes:      Conjunctiva/sclera: Conjunctivae normal.   Neck:      Musculoskeletal: Normal range of motion and neck supple.      Vascular: Normal carotid pulses. No carotid bruit.   Cardiovascular:      Rate and Rhythm: Normal rate and regular rhythm.      Heart sounds: S1 normal and S2 normal. No murmur.   Pulmonary:      Effort: Pulmonary effort is normal. No tachypnea.      Breath sounds: Normal breath sounds and air entry. No wheezing, rhonchi or rales.   Abdominal:      General: Bowel sounds are normal. There is no distension.      Palpations: Abdomen is soft.      Tenderness: There is no abdominal tenderness.   Musculoskeletal: Normal range of motion.   Skin:     General: Skin is warm and dry.      Findings: No erythema or rash.   Neurological:      General: No focal deficit present.      Mental Status: He is alert and oriented to person,  place, and time.      GCS: GCS eye subscore is 4. GCS verbal subscore is 5. GCS motor subscore is 6.      Cranial Nerves: Cranial nerves are intact.      Sensory: Sensation is intact.      Motor: Motor function is intact.      Coordination: Coordination is intact.      Comments: Only mild weakness to LLE.   Psychiatric:         Mood and Affect: Mood and affect normal.         Speech: Speech normal.         Behavior: Behavior normal. Behavior is cooperative.         Cognition and Memory: Memory normal    Significant Labs: All pertinent labs within the past 24 hours have been reviewed.    Significant Imaging: I have reviewed and interpreted all pertinent imaging results/findings within the past 24 hours.      Assessment/Plan:      * Cerebrovascular accident (CVA) due to thrombosis of left vertebral artery   - CT head showed Left Vertebral artery stenosis ,however MRI no acute stroke.   - Neurology recommends patient to be seen by Interventional Neurologist as outpatient  - On aspirin,plavix, Lipitor    - PT/OT recommends SNIF   - CM consulted for SNIF placement     Hypercholesteremia  - Lipitor    Essential hypertension  - Continue home amlodipine, hydralazine, and losartan.      VTE Risk Mitigation (From admission, onward)         Ordered     enoxaparin injection 40 mg  Every 24 hours      07/27/20 1932     IP VTE HIGH RISK PATIENT  Once      07/27/20 1932     Place sequential compression device  Until discontinued      07/27/20 1932                      Bin Duenas MD  Department of Hospital Medicine   [facility]

## 2020-07-30 NOTE — SUBJECTIVE & OBJECTIVE
Interval History: No events overnight     Review of Systems  Objective:     Vital Signs (Most Recent):  Temp: 96.6 °F (35.9 °C) (07/30/20 1149)  Pulse: 69 (07/30/20 1149)  Resp: 18 (07/30/20 1149)  BP: 120/69 (07/30/20 1149)  SpO2: 97 % (07/30/20 1149) Vital Signs (24h Range):  Temp:  [96.5 °F (35.8 °C)-98.6 °F (37 °C)] 96.6 °F (35.9 °C)  Pulse:  [48-77] 69  Resp:  [16-20] 18  SpO2:  [96 %-97 %] 97 %  BP: (120-175)/(56-86) 120/69     Weight: 88.8 kg (195 lb 12.3 oz)  Body mass index is 25.14 kg/m².    Intake/Output Summary (Last 24 hours) at 7/30/2020 1308  Last data filed at 7/29/2020 1700  Gross per 24 hour   Intake 480 ml   Output --   Net 480 ml      Physical Exam     General: He is awake. He is not in acute distress.     Appearance: Normal appearance. He is well-developed. He is not diaphoretic.   HENT:      Head: Normocephalic and atraumatic.   Eyes:      Conjunctiva/sclera: Conjunctivae normal.   Neck:      Musculoskeletal: Normal range of motion and neck supple.      Vascular: Normal carotid pulses. No carotid bruit.   Cardiovascular:      Rate and Rhythm: Normal rate and regular rhythm.      Heart sounds: S1 normal and S2 normal. No murmur.   Pulmonary:      Effort: Pulmonary effort is normal. No tachypnea.      Breath sounds: Normal breath sounds and air entry. No wheezing, rhonchi or rales.   Abdominal:      General: Bowel sounds are normal. There is no distension.      Palpations: Abdomen is soft.      Tenderness: There is no abdominal tenderness.   Musculoskeletal: Normal range of motion.   Skin:     General: Skin is warm and dry.      Findings: No erythema or rash.   Neurological:      General: No focal deficit present.      Mental Status: He is alert and oriented to person, place, and time.      GCS: GCS eye subscore is 4. GCS verbal subscore is 5. GCS motor subscore is 6.      Cranial Nerves: Cranial nerves are intact.      Sensory: Sensation is intact.      Motor: Motor function is intact.       Coordination: Coordination is intact.      Comments: Only mild weakness to LLE.   Psychiatric:         Mood and Affect: Mood and affect normal.         Speech: Speech normal.         Behavior: Behavior normal. Behavior is cooperative.         Cognition and Memory: Memory normal    Significant Labs: All pertinent labs within the past 24 hours have been reviewed.    Significant Imaging: I have reviewed and interpreted all pertinent imaging results/findings within the past 24 hours.

## 2020-08-20 ENCOUNTER — TELEPHONE (OUTPATIENT)
Dept: FAMILY MEDICINE | Facility: CLINIC | Age: 85
End: 2020-08-20

## 2020-08-20 NOTE — TELEPHONE ENCOUNTER
Called spouse   ---hydralazine 50mg BID   ---norvasc 5mg BID   Losartan 100mg  daily      Pt took extra hydralazine today @   140pm  At  130pm  bp  Left arm   150/90     Right arm  200/120        340 pm            Right arm  168/93     Left arm    155/86

## 2020-08-20 NOTE — TELEPHONE ENCOUNTER
----- Message from Kirsty Darling sent at 8/20/2020  2:44 PM CDT -----  Good afternoon,      Vibha with [facility] called to inform you that pt just got out of [facility] today and was admitted to home health today. Pt walked from his room to living room and blood pressure was 200/120 with left arm and 150/90 right arm. Before she left it came down to 140/80. She is concern because pt had minimum activity and the pressure jumped up really high. Vibha can be reached at [phone].      Thanks,  Kirsty Darling

## 2020-08-25 ENCOUNTER — OFFICE VISIT (OUTPATIENT)
Dept: URGENT CARE | Facility: CLINIC | Age: 85
End: 2020-08-25
Payer: MEDICARE

## 2020-08-25 VITALS
TEMPERATURE: 99 F | OXYGEN SATURATION: 95 % | SYSTOLIC BLOOD PRESSURE: 141 MMHG | HEART RATE: 107 BPM | DIASTOLIC BLOOD PRESSURE: 94 MMHG | RESPIRATION RATE: 18 BRPM

## 2020-08-25 DIAGNOSIS — U07.1 COVID-19 VIRUS DETECTED: Primary | ICD-10-CM

## 2020-08-25 DIAGNOSIS — Z20.822 SUSPECTED COVID-19 VIRUS INFECTION: ICD-10-CM

## 2020-08-25 LAB
CTP QC/QA: YES
SARS-COV-2 RDRP RESP QL NAA+PROBE: POSITIVE

## 2020-08-25 PROCEDURE — U0002 COVID-19 LAB TEST NON-CDC: HCPCS | Mod: S$GLB,,, | Performed by: NURSE PRACTITIONER

## 2020-08-25 PROCEDURE — 99214 OFFICE O/P EST MOD 30 MIN: CPT | Mod: S$GLB,,, | Performed by: NURSE PRACTITIONER

## 2020-08-25 PROCEDURE — U0002: ICD-10-PCS | Mod: S$GLB,,, | Performed by: NURSE PRACTITIONER

## 2020-08-25 PROCEDURE — 99214 PR OFFICE/OUTPT VISIT, EST, LEVL IV, 30-39 MIN: ICD-10-PCS | Mod: S$GLB,,, | Performed by: NURSE PRACTITIONER

## 2020-08-26 NOTE — PROGRESS NOTES
Subjective:       Patient ID: Chinmay Mei is a 86 y.o. male.    Vitals:  temporal temperature is 98.6 °F (37 °C). His blood pressure is 141/94 (abnormal) and his pulse is 107. His respiration is 18 and oxygen saturation is 95%.     Chief Complaint: No chief complaint on file.    Pt presents to clinic with sore throat and cough. Pt states he has been having it for two weeks now. Pt unsure if he's been exposed to COVID.    Cough  Associated symptoms include a sore throat. Pertinent negatives include no chest pain, chills, fever, headaches, myalgias, rash or shortness of breath.       Constitution: Negative for chills, fatigue and fever.   HENT: Positive for sore throat. Negative for congestion.    Neck: Negative for painful lymph nodes.   Cardiovascular: Negative for chest pain and leg swelling.   Eyes: Negative for double vision and blurred vision.   Respiratory: Positive for cough. Negative for shortness of breath.    Gastrointestinal: Negative for nausea, vomiting and diarrhea.   Genitourinary: Negative for dysuria, frequency and urgency.   Musculoskeletal: Negative for joint pain, joint swelling, muscle cramps and muscle ache.   Skin: Negative for color change, pale and rash.   Allergic/Immunologic: Negative for seasonal allergies.   Neurological: Negative for dizziness, history of vertigo, light-headedness, passing out and headaches.   Hematologic/Lymphatic: Negative for swollen lymph nodes, easy bruising/bleeding and history of blood clots. Does not bruise/bleed easily.   Psychiatric/Behavioral: Negative for nervous/anxious, sleep disturbance and depression. The patient is not nervous/anxious.        Objective:      Physical Exam   Constitutional: He is oriented to person, place, and time. He appears well-developed. He is cooperative.  Non-toxic appearance. He does not appear ill. No distress.   HENT:   Head: Normocephalic and atraumatic.   Ears:   Right Ear: Hearing and external ear normal.   Left Ear:  Hearing and external ear normal.   Nose: Nose normal. No mucosal edema, rhinorrhea or nasal deformity. No epistaxis. Right sinus exhibits no maxillary sinus tenderness and no frontal sinus tenderness. Left sinus exhibits no maxillary sinus tenderness and no frontal sinus tenderness.   Mouth/Throat: Uvula is midline, oropharynx is clear and moist and mucous membranes are normal. No trismus in the jaw. Normal dentition. No uvula swelling. No oropharyngeal exudate, posterior oropharyngeal edema or posterior oropharyngeal erythema.   Eyes: Conjunctivae and lids are normal. No scleral icterus.   Neck: Trachea normal, full passive range of motion without pain and phonation normal. Neck supple. No neck rigidity. No edema and no erythema present.   Cardiovascular: Normal rate, regular rhythm, normal heart sounds and normal pulses.   Pulmonary/Chest: Effort normal and breath sounds normal. No respiratory distress. He has no decreased breath sounds. He has no rhonchi.   Abdominal: Normal appearance.   Musculoskeletal: Normal range of motion.         General: No deformity.   Neurological: He is alert and oriented to person, place, and time. He exhibits normal muscle tone. Coordination normal.   Skin: Skin is warm, dry, intact, not diaphoretic and not pale. Psychiatric: His speech is normal and behavior is normal. Judgment and thought content normal.   Nursing note and vitals reviewed.    Patient was seen remotely due to State of Emergency for the COVID-19 outbreak.      Assessment:       1. COVID-19 virus detected    2. Suspected Covid-19 Virus Infection        Plan:         COVID-19 virus detected    Suspected Covid-19 Virus Infection  -     POCT COVID-19 Rapid Screening      Results for orders placed or performed in visit on 08/25/20   POCT COVID-19 Rapid Screening   Result Value Ref Range    POC Rapid COVID Positive (A) Negative     Acceptable Yes         COVID education    · Your symptoms are likely due to a  viral infection. These infections can last up to 14 days, but you should notice some improvement of your symptoms within the first 7-10 days. Viral infections will not improve with antibiotics. If your symptoms persist >10 days without improvement or if you have any new or worsening symptoms, this is an indication that you may have developed a bacterial infection and should return to your primary care provider or to Urgent Care.   · Getting plenty of rest is very important to fighting infections.  · Increase fluids.   · May apply warm compresses as needed for facial pain and congestion.   · Saline nasal spray to loosen nasal congestion.   · Plain Mucinex as advised  · You may take an over the counter antihistamine for allergy symptoms such as sneezing, itchy/watery eyes, scratchy throat, or congestion.  · Take Tylenol or Ibuprofen as needed for sore throat, body aches, or fever.  · Follow up with your primary care provider if symptoms persist >10 days or sooner for any new or worsening symptoms.   Go to the ER for any fever that does not improve with Tylenol/Ibuprofen, neck stiffness, rash, severe headache, vision changes, shortness of breath, chest pain, facial swelling, severe facial pain, or any other new and concerning symptoms.

## 2020-08-26 NOTE — PATIENT INSTRUCTIONS
Cough, Chronic, Uncertain Cause (Adult)    Everyone has had a cough as part of the common cold, flu, or bronchitis. This kind of cough occurs along with an achy feeling, low-grade fever, nasal and sinus congestion, and a scratchy or sore throat. This usually gets better in 2 to 3 weeks. A cough that lasts longer than 3 weeks may be due to other causes.  If your cough does not improve over the next 2 weeks, further testing may be needed. Follow up with your healthcare provider as advised. Cough suppressants may be recommended. Based on your exam today, the exact cause of your cough is not certain. Below are some common causes for persistent cough.  Smokers cough  Smokers cough doesnt go away. If you continue to smoke, it only gets worse. The cough is from irritation in the air passages. Talk to your healthcare provider about quitting. Medicines or nicotine-replacement products, like gum or the patch, may make quitting easier.  Postnasal drip  A cough that is worse at night may be due to postnasal drip. Excess mucus in the nose drains from the back of your nose to your throat. This triggers the cough reflex. Postnasal drip may be due to a sinus infection or allergy. Common allergens include dust, tobacco smoke (both inhaled and secondhand smoke), environmental pollutants, pollen, mold, pets, cleaning agents, room deodorizers, and chemical fumes. Over-the-counter antihistamines or decongestants may be helpful for allergies. A sinus infection may requires antibiotic treatment. See your healthcare provider if symptoms continue.  Medicines  Certain prescribed medicines can cause a chronic cough in some people:  · ACE inhibitors for high blood pressure. These include benazepril, captopril, enalapril, fosinopril, lisinopril, quinapril, ramipril, and others.  · Beta-blockers for high blood pressure and other conditions. These include propranolol, atenolol, metoprolol, nadolol, and others.  Let your healthcare provider  know if you are taking any of these.  Asthma  Cough may be the only sign of mild asthma. You may have tests to find out if asthma is causing your cough. You may also take asthma medicine on a trial basis.  Acid reflux (heartburn, GERD)  The esophagus is the tube that carries food from the mouth to the stomach. A valve at its lower end prevents stomach acids from flowing upward. If this valve does not work properly, acid from the stomach enters the esophagus. This may cause a burning pain in the upper abdomen or lower chest, belching, or cough. Symptoms are often worse when lying flat. Avoid eating or drinking before bedtime. Try using extra pillows to raise your upper body, or place 4-inch blocks under the head of your bed. You may try an over-the-counter antacid or an acid-blocking medicine such as famotidine, cimetidine, ranitidine, esomeprazole, lansoprazole, or omeprazole. Stronger medicines for this condition can be prescribed by your healthcare provider.  Follow-up care  Follow up with your healthcare provider, or as advised, if your cough does not improve. Further testing may be needed.  Note: If an X-ray was taken, a specialist will review it. You will be notified of any new findings that may affect your care.  When to seek medical advice  Call your healthcare provider right away if any of these occur:  · Mild wheezing or difficulty breathing  · Fever of 100.4ºF (38ºC) or higher, or as directed by your healthcare provider  · Unexpected weight loss  · Coughing up large amounts of colored sputum  · Night sweats (sheets and pajamas get soaking wet)  Call 911, or get immediate medical care  Contact emergency services right away if any of these occur:  · Coughing up blood  · Moderate to severe trouble breathing or wheezing  Date Last Reviewed: 9/13/2015  © 5664-3616 [employer]. [address] [place] [address], [place] [address]. All rights reserved. This information is not intended as a substitute for  professional medical care. Always follow your healthcare professional's instructions.          Self-Care for Sore Throats    Sore throats happen for many reasons, such as colds, allergies, and infections caused by viruses or bacteria. In any case, your throat becomes red and sore. Your goal for self-care is to reduce your discomfort while giving your throat a chance to heal.  Moisten and soothe your throat  Tips include the following:  · Try a sip of water first thing after waking up.  · Keep your throat moist by drinking 6 or more glasses of clear liquids every day.  · Run a cool-air humidifier in your room overnight.  · Avoid cigarette smoke.   · Suck on throat lozenges, cough drops, hard candy, ice chips, or frozen fruit-juice bars. Use the sugar-free versions if your diet or medical condition requires them.  Gargle to ease irritation  Gargling every hour or 2 can ease irritation. Try gargling with 1 of these solutions:  · 1/4 teaspoon of salt in 1/2 cup of warm water  · An over-the-counter anesthetic gargle  Use medicine for more relief  Over-the-counter medicine can reduce sore throat symptoms. Ask your pharmacist if you have questions about which medicine to use:  · Ease pain with anesthetic sprays. Aspirin or an aspirin substitute also helps. Remember, never give aspirin to anyone 18 or younger, or if you are already taking blood thinners.   · For sore throats caused by allergies, try antihistamines to block the allergic reaction.  · Remember: unless a sore throat is caused by a bacterial infection, antibiotics wont help you.  Prevent future sore throats  Prevention tips include the following:  · Stop smoking or reduce contact with secondhand smoke. Smoke irritates the tender throat lining.  · Limit contact with pets and with allergy-causing substances, such as pollen and mold.  · When youre around someone with a sore throat or cold, wash your hands often to keep viruses or bacteria from spreading.  · Dont  strain your vocal cords.  Call your healthcare provider  Contact your healthcare provider if you have:  · A temperature over 101°F (38.3°C)  · White spots on the throat  · Great difficulty swallowing  · Trouble breathing  · A skin rash  · Recent exposure to someone else with strep bacteria  · Severe hoarseness and swollen glands in the neck or jaw   Date Last Reviewed: 8/1/2016 © 2000-2017 [employer]. [address] [place] [address], [place] [address]. All rights reserved. This information is not intended as a substitute for professional medical care. Always follow your healthcare professional's instructions.      Guidelines for General Prevention of COVID-19    o Take steps to protect yourself from COVID-19. Perform hand hygiene frequently. Wash your hands often with soap and water for at least 20 seconds of use and alcohol-based hand , covering all surfaces of your hands and rubbing them together until they feel dry.  o Avoid touching your eyes, nose, and mouth with unwashed hands.  o Avoid close contact with people and stay home if youre sick, except to get medical care.   o Cover coughs and sneezes with a tissue, or use the inside of your elbow. Immediately wash your hands or use hand .     For more information, see CDC link below:    https://www.cdc.gov/coronavirus/2019-ncov/hcp/guidance-prevent-spread.html#precautions

## 2020-08-31 ENCOUNTER — TELEPHONE (OUTPATIENT)
Dept: FAMILY MEDICINE | Facility: CLINIC | Age: 85
End: 2020-08-31

## 2020-08-31 NOTE — TELEPHONE ENCOUNTER
----- Message from Branden Townsend sent at 8/31/2020  1:53 PM CDT -----  ..Type:  Patient Returning Call    Who Called: Deepali Mei (Daughter)  Who Left Message for Patient:  Does the patient know what this is regarding?: health concern   Would the patient rather a call back or a response via [facility]?  Call back   Best Call Back Number: [phone]  Additional Information: Deepali Mei (Daughter) is requesting a call from nurse to discuss procedures for the pt  and pt is COVID postive

## 2020-08-31 NOTE — TELEPHONE ENCOUNTER
Patient daughter called concerned about her father's recent decline in health and states she needed to get him scheduled with Neurology asap. Patient has appointment with you on 9/9/2020 patient was diagnosed with covid on 8/26/2020 this will be exactly 14 days out since his positive diagnosis. I just wanted to make sure he was ok to be seen on this date or if we needed to reschedule his appointment to a later date. Please advise.

## 2020-09-04 ENCOUNTER — TELEPHONE (OUTPATIENT)
Dept: NEUROLOGY | Facility: CLINIC | Age: 85
End: 2020-09-04

## 2020-09-04 NOTE — TELEPHONE ENCOUNTER
Called and left a message for Deepali regarding her father's appt with  on the upcoming Wednesday. I stated that  is still concern about COVID-19 and would like to see if possible we can change his appt to virtual visit.

## 2020-09-06 ENCOUNTER — PATIENT OUTREACH (OUTPATIENT)
Dept: ADMINISTRATIVE | Facility: OTHER | Age: 85
End: 2020-09-06

## 2020-09-06 NOTE — PROGRESS NOTES
Care Everywhere: updated  Immunization: no profile in links  Health Maintenance: updated  Media Review:   Legacy Review:   Order placed:   Upcoming appts:

## 2020-09-08 ENCOUNTER — TELEPHONE (OUTPATIENT)
Dept: NEUROLOGY | Facility: CLINIC | Age: 85
End: 2020-09-08

## 2020-09-08 NOTE — TELEPHONE ENCOUNTER
----- Message from Turner Guzman sent at 9/8/2020  9:09 AM CDT -----  Contact: Janessa (ex spouse) @ [phone]  Janessa is asking to speak w/ Kathie to change appt tomorrow to VV

## 2020-09-17 ENCOUNTER — OFFICE VISIT (OUTPATIENT)
Dept: NEUROLOGY | Facility: CLINIC | Age: 85
End: 2020-09-17
Payer: MEDICARE

## 2020-09-17 DIAGNOSIS — I63.9 CEREBROVASCULAR ACCIDENT (CVA), UNSPECIFIED MECHANISM: ICD-10-CM

## 2020-09-17 DIAGNOSIS — E78.00 HYPERCHOLESTEREMIA: Chronic | ICD-10-CM

## 2020-09-17 DIAGNOSIS — I63.012 CEREBROVASCULAR ACCIDENT (CVA) DUE TO THROMBOSIS OF LEFT VERTEBRAL ARTERY: ICD-10-CM

## 2020-09-17 DIAGNOSIS — I10 ESSENTIAL HYPERTENSION: Chronic | ICD-10-CM

## 2020-09-17 NOTE — PROGRESS NOTES
The patient location is: [place]  The chief complaint leading to consultation is: Stroke follow up     Visit type: audiovisual    Face to Face time with patient: 35  min    35 minutes of total time spent on the encounter, which includes face to face time and non-face to face time preparing to see the patient (eg, review of tests), Obtaining and/or reviewing separately obtained history, Documenting clinical information in the electronic or other health record, Independently interpreting results (not separately reported) and communicating results to the patient/family/caregiver, or Care coordination (not separately reported).         Each patient to whom he or she provides medical services by telemedicine is:  (1) informed of the relationship between the physician and patient and the respective role of any other health care provider with respect to management of the patient; and (2) notified that he or she may decline to receive medical services by telemedicine and may withdraw from such care at any time.          Neurology Clinic  Initial Consult     Patient Name: Chinmay Mei  MRN: [id number]    CC: Stroke Follow up    HPI: Chinmay Mei is a 86 y.o. male w/ a medical history significant for arthritis, HLD, HTN and TIA in July 2020,  is seen in consultation at the request of Dr. Bin Duenas for evaluation and management of thrombosis of his left vertebral artery.     Patient initially presented to [facility] on 7/27/20 with complaints of dizziness that occurred six hours prior to arrival. Patient described the dizziness as feeling off balance with associated mild left lower extremity weakness. This has caused him to fall multiple times as he was leaning to the left while ambulating. At that time he denied any headache, visual disturbance, syncope, confusion, facial droop, slurred speech. CTH showed no acute findings, chronic lacunar infarct anterior limb of right internal capsule, and mild to  moderate changes of chronic microvascular ischemia involving the bifrontal lobe white matter. Vascular Neurology consulted in the ED via TeleStroke, and recommended further imaging to r/o occlusion needing intervention. CTA head and neck showed a short segment occlusion involving the distal left vertebral artery with some calcified plaque present suggesting underlying stenosis, however, there is apparent short segment of thrombus which appears acute within this distal left vertebral artery with some distal reconstitution; distally there is good filling of the basilar artery, though there is a moderate area of stenosis in the mid to distal basilar artery; no acute thrombi are noted intracranially in the posterior circulation; no acute thromboembolic disease in the anterior circulation; no significant carotid atherosclerosis is evident. Vascular Neurology reviewed CTA results and felt findings appear chronic. Patient received 325 mg ASA and was discharged on 81 mg ASA (to be taken indefinitely) and 75 mg Plavix (for three months since discharge). MRI brain w/o contrast was also done which showed chronic microvascular ischemic changes.     On telehealth visit patient states that he does not have symptoms of dizziness. His balance and gait is back to normal. HE is compliant to all of his medication.     Review of Systems:  Constitutional: No fevers, chills  Eyes: No acute decrease in vision, nor eye discharge  ENT: No changes in hearing nor sore throat  Respiratory: No shortness of breath nor cough  CV: No chest pain, edema  GI: No blood in stool, nausea, vomiting  : No hematuria, dysuria  Skin: No rashes, pruritis  Neurological: No weakness, confusion  Psychiatric: No auditory nor visual hallucinations    Past Medical History  Past Medical History:   Diagnosis Date    Acute ischemic stroke 7/27/2020    Arthritis     Hyperlipidemia     Hypertension     Open angle with borderline findings, low risk, 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     Any other notable PMH as documented in HPI    Medications    Current Outpatient Medications:     amLODIPine (NORVASC) 5 MG tablet, Take 1 tablet (5 mg total) by mouth 2 (two) times daily., Disp: 180 tablet, Rfl: 3    aspirin (ECOTRIN) 81 MG EC tablet, Take 1 tablet (81 mg total) by mouth once daily., Disp: 30 tablet, Rfl: 0    atorvastatin (LIPITOR) 40 MG tablet, Take 1 tablet (40 mg total) by mouth every evening., Disp: 90 tablet, Rfl: 3    clopidogreL (PLAVIX) 75 mg tablet, Take 1 tablet (75 mg total) by mouth once daily., Disp: 90 tablet, Rfl: 3    donepezil (ARICEPT) 10 MG tablet, Take 1 tablet (10 mg total) by mouth every evening., Disp: 90 tablet, Rfl: 3    hydrALAZINE (APRESOLINE) 50 MG tablet, Take 1 tablet (50 mg total) by mouth every 12 (twelve) hours., Disp: 180 tablet, Rfl: 3    hydroCHLOROthiazide (HYDRODIURIL) 12.5 MG Tab, Take 1 tablet (12.5 mg total) by mouth every 48 hours. for 3 doses, Disp: 3 tablet, Rfl: 0    losartan (COZAAR) 100 MG tablet, Take 1 tablet (100 mg total) by mouth once daily., Disp: 90 tablet, Rfl: 3  Any other notable medications as documented in HPI    Allergies  Review of patient's allergies indicates:   Allergen Reactions    Aspirin      bleeding       Social History  Social History     Socioeconomic History    Marital status:      Spouse name: Not on file    Number of children: Not on file    Years of education: Not on file    Highest education level: Not on file   Occupational History    Not on file   Social Needs    Financial resource strain: Somewhat hard    Food insecurity     Worry: Sometimes true     Inability: Sometimes true    Transportation needs     Medical: No     Non-medical: No   Tobacco Use    Smoking status: Former Smoker     Quit date: 2006     Years since quittin.1    Smokeless tobacco: Never Used   Substance and Sexual Activity    Alcohol use: No     Frequency: Never     Binge frequency: Never    Drug use: No     Sexual activity: Not on file   Lifestyle    Physical activity     Days per week: 0 days     Minutes per session: 0 min    Stress: Not at all   Relationships    Social connections     Talks on phone: More than three times a week     Gets together: Once a week     Attends Baptism service: Not on file     Active member of club or organization: No     Attends meetings of clubs or organizations: Never     Relationship status:    Other Topics Concern    Not on file   Social History Narrative    Not on file     Any other notable Social History as documented in HPI.    Family History  Family History   Problem Relation Age of Onset    Heart disease Mother     Hypertension Mother     Prostate cancer Father     Diabetes Father     Diabetes Sister     Heart disease Sister     Glaucoma Sister     Hypertension Sister     Ulcers Sister     Heart disease Brother     Diabetes Brother     Hypertension Brother     Kidney disease Daughter         dialysis    Stomach cancer Daughter     Diabetes Son     Hypertension Son     Stroke Brother      Any other notable FMH as documented in HPI.    Physical Exam  There were no vitals taken for this visit.    BP Readings from Last 3 Encounters:   08/25/20 (!) 141/94   07/30/20 (!) 148/76   11/19/19 (!) 142/80       Wt Readings from Last 1 Encounters:   07/30/20 0359 88.8 kg (195 lb 12.3 oz)   07/29/20 0425 90 kg (198 lb 6.6 oz)   07/28/20 0530 88.5 kg (195 lb)   07/28/20 0410 88.5 kg (195 lb 1.7 oz)   07/27/20 2022 92.2 kg (203 lb 4.2 oz)         Physical Exam   Constitutional: appears well-developed. Active. No distress.   HENT:   Head: Normocephalic.   Neck: Normal range of motion.   Pulmonary/Chest: Effort normal.   Musculoskeletal: Normal range of motion.   Neurological: alert.   Awake, alert, and oriented for age  Normal speech, appropriate response to questions  EOM intact. No Nystagmus. No ophthalmoplegia.    Facial expression is full and symmetric.    Tongue is midline   Moves all 4 extremities against gravity  Is able to squat and raise arms above the head  No bradykinesia or dysdiadochokinesia.  Normal proprioception on upper extremities   Coordination (Finger to chin) is normal bilaterally.  No appendicular ataxia  Normal gait and balance.   Psychiatric: Normal mood and affect. Speech is normal. Thought content normal.      Lab and Test Results    WBC   Date Value Ref Range Status   07/29/2020 6.87 3.90 - 12.70 K/uL Final   07/28/2020 9.05 3.90 - 12.70 K/uL Final   07/27/2020 10.00 3.90 - 12.70 K/uL Final     Hemoglobin   Date Value Ref Range Status   07/29/2020 15.2 14.0 - 18.0 g/dL Final   07/28/2020 14.6 14.0 - 18.0 g/dL Final   07/27/2020 14.2 14.0 - 18.0 g/dL Final     Hematocrit   Date Value Ref Range Status   07/29/2020 48.1 40.0 - 54.0 % Final   07/28/2020 46.1 40.0 - 54.0 % Final   07/27/2020 44.8 40.0 - 54.0 % Final     Platelets   Date Value Ref Range Status   07/29/2020 286 150 - 350 K/uL Final   07/28/2020 279 150 - 350 K/uL Final   07/27/2020 268 150 - 350 K/uL Final     Glucose   Date Value Ref Range Status   07/29/2020 93 70 - 110 mg/dL Final   07/28/2020 102 70 - 110 mg/dL Final   07/27/2020 143 (H) 70 - 110 mg/dL Final     Sodium   Date Value Ref Range Status   07/29/2020 134 (L) 136 - 145 mmol/L Final   07/28/2020 136 136 - 145 mmol/L Final   07/27/2020 136 136 - 145 mmol/L Final     Potassium   Date Value Ref Range Status   07/29/2020 3.4 (L) 3.5 - 5.1 mmol/L Final   07/28/2020 4.1 3.5 - 5.1 mmol/L Final   07/27/2020 4.0 3.5 - 5.1 mmol/L Final     Chloride   Date Value Ref Range Status   07/29/2020 95 95 - 110 mmol/L Final   07/28/2020 98 95 - 110 mmol/L Final   07/27/2020 99 95 - 110 mmol/L Final     CO2   Date Value Ref Range Status   07/29/2020 23 23 - 29 mmol/L Final   07/28/2020 24 23 - 29 mmol/L Final   07/27/2020 22 (L) 23 - 29 mmol/L Final     BUN, Bld   Date Value Ref Range Status   07/29/2020 16 8 - 23 mg/dL Final   07/28/2020  11 8 - 23 mg/dL Final   07/27/2020 12 8 - 23 mg/dL Final     Creatinine   Date Value Ref Range Status   07/29/2020 1.1 0.5 - 1.4 mg/dL Final   07/28/2020 0.9 0.5 - 1.4 mg/dL Final   07/27/2020 1.0 0.5 - 1.4 mg/dL Final     Calcium   Date Value Ref Range Status   07/29/2020 9.8 8.7 - 10.5 mg/dL Final   07/28/2020 10.0 8.7 - 10.5 mg/dL Final   07/27/2020 9.7 8.7 - 10.5 mg/dL Final     Magnesium   Date Value Ref Range Status   07/28/2020 2.0 1.6 - 2.6 mg/dL Final     Phosphorus   Date Value Ref Range Status   07/28/2020 2.6 (L) 2.7 - 4.5 mg/dL Final     Alkaline Phosphatase   Date Value Ref Range Status   07/29/2020 89 55 - 135 U/L Final   07/28/2020 93 55 - 135 U/L Final   07/27/2020 88 55 - 135 U/L Final     ALT   Date Value Ref Range Status   07/29/2020 20 10 - 44 U/L Final   07/28/2020 16 10 - 44 U/L Final   07/27/2020 17 10 - 44 U/L Final     AST   Date Value Ref Range Status   07/29/2020 41 (H) 10 - 40 U/L Final   07/28/2020 29 10 - 40 U/L Final   07/27/2020 25 10 - 40 U/L Final       Assessment and Plan  85 y/o male with a medical history of HTN, HLD and arthritis presents as a new patient for a stroke follow up. Towards the end of July, patient was admitted to [facility] for dizziness. Although MRI brain w/o contrast did not reveal any acute infarct, CTA of the head and neck showed, short segment occlusion involving the distal left vertebral artery with some calcified plaque present suggesting underlying stenosis. There is some distal reconstitution. Patient had good filling of the basilar artery. No acute thrombi in the posterior circulation as well as the anterior circulation.     Given patient's age and the fact that he does not have any FND or dizziness since discharge, it is best to continue with medical management. It is not necessary for the patient to undergo stenting or have a thrombectomy.     Continue the ASA 81 mg indefinitely. Continue plavix for a total of three months (end date in  November). Stroke risk factors and lifestyle changes discussed.     F/u with me in December    Problem List Items Addressed This Visit        Neuro    Cerebrovascular accident (CVA) due to thrombosis of left vertebral artery    Current Assessment & Plan     Continue ASA 81 mg indefinitely   Continue plavix 75 mg for a total of three months               Cardiac/Vascular    Essential hypertension (Chronic)    Current Assessment & Plan     Management per primary  Stroke RF          Hypercholesteremia (Chronic)    Current Assessment & Plan     Continue statin 40 mg daily            Other Visit Diagnoses     Cerebrovascular accident (CVA), unspecified mechanism                Charity Qiu MD  Neurology Resident   [facility] [address] [place] [address]

## 2020-09-23 ENCOUNTER — TELEPHONE (OUTPATIENT)
Dept: FAMILY MEDICINE | Facility: CLINIC | Age: 85
End: 2020-09-23

## 2020-09-23 NOTE — TELEPHONE ENCOUNTER
----- Message from Courtney Pemberton sent at 9/23/2020  9:48 AM CDT -----  Regarding: Advice  Contact: [facility] health nurse- Deepika Kraft  Please call to advise if it's okay for the nurse to put in for a physical therapy evaluation for the patient. Please call to advise at Ph [phone] (Deepika)

## 2020-09-29 ENCOUNTER — PATIENT MESSAGE (OUTPATIENT)
Dept: OTHER | Facility: OTHER | Age: 85
End: 2020-09-29

## 2020-10-20 ENCOUNTER — DOCUMENT SCAN (OUTPATIENT)
Dept: HOME HEALTH SERVICES | Facility: HOSPITAL | Age: 85
End: 2020-10-20
Payer: MEDICARE

## 2020-11-19 ENCOUNTER — PATIENT MESSAGE (OUTPATIENT)
Dept: FAMILY MEDICINE | Facility: CLINIC | Age: 85
End: 2020-11-19

## 2020-11-19 RX ORDER — CLOPIDOGREL BISULFATE 75 MG/1
75 TABLET ORAL DAILY
Qty: 90 TABLET | Refills: 3 | Status: SHIPPED | OUTPATIENT
Start: 2020-11-19 | End: 2020-11-23 | Stop reason: SDUPTHER

## 2020-11-19 RX ORDER — ATORVASTATIN CALCIUM 40 MG/1
40 TABLET, FILM COATED ORAL NIGHTLY
Qty: 90 TABLET | Refills: 3 | Status: SHIPPED | OUTPATIENT
Start: 2020-11-19 | End: 2020-11-23 | Stop reason: SDUPTHER

## 2020-11-22 ENCOUNTER — PATIENT MESSAGE (OUTPATIENT)
Dept: FAMILY MEDICINE | Facility: CLINIC | Age: 85
End: 2020-11-22

## 2020-11-23 RX ORDER — ATORVASTATIN CALCIUM 40 MG/1
40 TABLET, FILM COATED ORAL NIGHTLY
Qty: 90 TABLET | Refills: 3 | Status: SHIPPED | OUTPATIENT
Start: 2020-11-23 | End: 2021-04-04 | Stop reason: SDUPTHER

## 2020-11-23 RX ORDER — CLOPIDOGREL BISULFATE 75 MG/1
75 TABLET ORAL DAILY
Qty: 90 TABLET | Refills: 3 | Status: SHIPPED | OUTPATIENT
Start: 2020-11-23 | End: 2020-12-10 | Stop reason: ALTCHOICE

## 2020-12-09 ENCOUNTER — PATIENT OUTREACH (OUTPATIENT)
Dept: ADMINISTRATIVE | Facility: OTHER | Age: 85
End: 2020-12-09

## 2020-12-10 ENCOUNTER — OFFICE VISIT (OUTPATIENT)
Dept: NEUROLOGY | Facility: CLINIC | Age: 85
End: 2020-12-10

## 2020-12-10 DIAGNOSIS — I63.012 CEREBROVASCULAR ACCIDENT (CVA) DUE TO THROMBOSIS OF LEFT VERTEBRAL ARTERY: ICD-10-CM

## 2020-12-10 DIAGNOSIS — E78.00 HYPERCHOLESTEREMIA: Chronic | ICD-10-CM

## 2020-12-10 DIAGNOSIS — I10 ESSENTIAL HYPERTENSION: Chronic | ICD-10-CM

## 2020-12-10 NOTE — PROGRESS NOTES
The patient location is: [place]  The chief complaint leading to consultation is: Stroke follow up     Visit type: audiovisual    Face to Face time with patient: 35  min    35 minutes of total time spent on the encounter, which includes face to face time and non-face to face time preparing to see the patient (eg, review of tests), Obtaining and/or reviewing separately obtained history, Documenting clinical information in the electronic or other health record, Independently interpreting results (not separately reported) and communicating results to the patient/family/caregiver, or Care coordination (not separately reported).         Each patient to whom he or she provides medical services by telemedicine is:  (1) informed of the relationship between the physician and patient and the respective role of any other health care provider with respect to management of the patient; and (2) notified that he or she may decline to receive medical services by telemedicine and may withdraw from such care at any time.    Neurology Clinic  Initial Consult     Patient Name: Chinmay Mei  MRN: [id number]    CC: Stroke Follow up    Brief HPI:88 y/o male with a medical history of HTN, HLD and arthritis presents as a new patient for a stroke follow up. Towards the end of July, patient was admitted to [facility] for dizziness. Although MRI brain w/o contrast did not reveal any acute infarct, CTA of the head and neck showed, short segment occlusion involving the distal left vertebral artery with some calcified plaque present suggesting underlying stenosis. There is some distal reconstitution. Patient had good filling of the basilar artery. No acute thrombi in the posterior circulation as well as the anterior circulation. Given patient's age and the fact that he does not have any FND or dizziness since discharge, it is best to continue with medical management. It is not necessary for the patient to undergo stenting or have a  thrombectomy.     Patient currently finished his 3 month course of plavix and will continue the ASA 81 mg indefinitely.     Review of Systems:  Constitutional: No fevers, chills  Eyes: No acute decrease in vision, nor eye discharge  ENT: No changes in hearing nor sore throat  Respiratory: No shortness of breath nor cough  CV: No chest pain, edema  GI: No blood in stool, nausea, vomiting  : No hematuria, dysuria  Skin: No rashes, pruritis  Neurological: No weakness, confusion  Psychiatric: No auditory nor visual hallucinations    Past Medical History  Past Medical History:   Diagnosis Date    Acute ischemic stroke 7/27/2020    Arthritis     Hyperlipidemia     Hypertension     Open angle with borderline findings, low risk, bilateral 8/3/2018     Any other notable PMH as documented in HPI    Medications    Current Outpatient Medications:     amLODIPine (NORVASC) 5 MG tablet, Take 1 tablet (5 mg total) by mouth 2 (two) times daily., Disp: 180 tablet, Rfl: 3    aspirin (ECOTRIN) 81 MG EC tablet, Take 1 tablet (81 mg total) by mouth once daily., Disp: 30 tablet, Rfl: 0    atorvastatin (LIPITOR) 40 MG tablet, Take 1 tablet (40 mg total) by mouth every evening., Disp: 90 tablet, Rfl: 3    clopidogreL (PLAVIX) 75 mg tablet, Take 1 tablet (75 mg total) by mouth once daily., Disp: 90 tablet, Rfl: 3    donepezil (ARICEPT) 10 MG tablet, Take 1 tablet (10 mg total) by mouth every evening., Disp: 90 tablet, Rfl: 3    hydrALAZINE (APRESOLINE) 50 MG tablet, Take 1 tablet (50 mg total) by mouth every 12 (twelve) hours., Disp: 180 tablet, Rfl: 3    losartan (COZAAR) 100 MG tablet, Take 1 tablet (100 mg total) by mouth once daily., Disp: 90 tablet, Rfl: 3  Any other notable medications as documented in HPI    Allergies  Review of patient's allergies indicates:   Allergen Reactions    Aspirin      bleeding       Social History  Social History     Socioeconomic History    Marital status:      Spouse name: Not on  file    Number of children: Not on file    Years of education: Not on file    Highest education level: Not on file   Occupational History    Not on file   Social Needs    Financial resource strain: Somewhat hard    Food insecurity     Worry: Sometimes true     Inability: Sometimes true    Transportation needs     Medical: No     Non-medical: No   Tobacco Use    Smoking status: Former Smoker     Quit date: 2006     Years since quittin.4    Smokeless tobacco: Never Used   Substance and Sexual Activity    Alcohol use: No     Frequency: Never     Binge frequency: Never    Drug use: No    Sexual activity: Not on file   Lifestyle    Physical activity     Days per week: 0 days     Minutes per session: 0 min    Stress: Not at all   Relationships    Social connections     Talks on phone: More than three times a week     Gets together: Once a week     Attends Holiness service: Not on file     Active member of club or organization: No     Attends meetings of clubs or organizations: Never     Relationship status:    Other Topics Concern    Not on file   Social History Narrative    Not on file     Any other notable Social History as documented in HPI.    Family History  Family History   Problem Relation Age of Onset    Heart disease Mother     Hypertension Mother     Prostate cancer Father     Diabetes Father     Diabetes Sister     Heart disease Sister     Glaucoma Sister     Hypertension Sister     Ulcers Sister     Heart disease Brother     Diabetes Brother     Hypertension Brother     Kidney disease Daughter         dialysis    Stomach cancer Daughter     Diabetes Son     Hypertension Son     Stroke Brother      Any other notable FMH as documented in HPI.    Physical Exam  There were no vitals taken for this visit.    BP Readings from Last 3 Encounters:   20 (!) 141/94   20 (!) 148/76   19 (!) 142/80       Wt Readings from Last 1 Encounters:   20  0359 88.8 kg (195 lb 12.3 oz)   07/29/20 0425 90 kg (198 lb 6.6 oz)   07/28/20 0530 88.5 kg (195 lb)   07/28/20 0410 88.5 kg (195 lb 1.7 oz)   07/27/20 2022 92.2 kg (203 lb 4.2 oz)         Physical Exam   Constitutional: appears well-developed. Active. No distress.   HENT:   Head: Normocephalic.   Neck: Normal range of motion.   Pulmonary/Chest: Effort normal.   Musculoskeletal: Normal range of motion.   Neurological: alert.   Awake, alert, and oriented for age  Normal speech, appropriate response to questions  EOM intact. No Nystagmus. No ophthalmoplegia.    Facial expression is full and symmetric.   Tongue is midline   Moves all 4 extremities against gravity  Is able to squat and raise arms above the head  No bradykinesia or dysdiadochokinesia.  Normal proprioception on upper extremities   Coordination (Finger to chin) is normal bilaterally.  No appendicular ataxia  Normal gait and balance.   Psychiatric: Normal mood and affect. Speech is normal. Thought content normal.      Lab and Test Results    WBC   Date Value Ref Range Status   07/29/2020 6.87 3.90 - 12.70 K/uL Final   07/28/2020 9.05 3.90 - 12.70 K/uL Final   07/27/2020 10.00 3.90 - 12.70 K/uL Final     Hemoglobin   Date Value Ref Range Status   07/29/2020 15.2 14.0 - 18.0 g/dL Final   07/28/2020 14.6 14.0 - 18.0 g/dL Final   07/27/2020 14.2 14.0 - 18.0 g/dL Final     Hematocrit   Date Value Ref Range Status   07/29/2020 48.1 40.0 - 54.0 % Final   07/28/2020 46.1 40.0 - 54.0 % Final   07/27/2020 44.8 40.0 - 54.0 % Final     Platelets   Date Value Ref Range Status   07/29/2020 286 150 - 350 K/uL Final   07/28/2020 279 150 - 350 K/uL Final   07/27/2020 268 150 - 350 K/uL Final     Glucose   Date Value Ref Range Status   07/29/2020 93 70 - 110 mg/dL Final   07/28/2020 102 70 - 110 mg/dL Final   07/27/2020 143 (H) 70 - 110 mg/dL Final     Sodium   Date Value Ref Range Status   07/29/2020 134 (L) 136 - 145 mmol/L Final   07/28/2020 136 136 - 145 mmol/L Final    07/27/2020 136 136 - 145 mmol/L Final     Potassium   Date Value Ref Range Status   07/29/2020 3.4 (L) 3.5 - 5.1 mmol/L Final   07/28/2020 4.1 3.5 - 5.1 mmol/L Final   07/27/2020 4.0 3.5 - 5.1 mmol/L Final     Chloride   Date Value Ref Range Status   07/29/2020 95 95 - 110 mmol/L Final   07/28/2020 98 95 - 110 mmol/L Final   07/27/2020 99 95 - 110 mmol/L Final     CO2   Date Value Ref Range Status   07/29/2020 23 23 - 29 mmol/L Final   07/28/2020 24 23 - 29 mmol/L Final   07/27/2020 22 (L) 23 - 29 mmol/L Final     BUN   Date Value Ref Range Status   07/29/2020 16 8 - 23 mg/dL Final   07/28/2020 11 8 - 23 mg/dL Final   07/27/2020 12 8 - 23 mg/dL Final     Creatinine   Date Value Ref Range Status   07/29/2020 1.1 0.5 - 1.4 mg/dL Final   07/28/2020 0.9 0.5 - 1.4 mg/dL Final   07/27/2020 1.0 0.5 - 1.4 mg/dL Final     Calcium   Date Value Ref Range Status   07/29/2020 9.8 8.7 - 10.5 mg/dL Final   07/28/2020 10.0 8.7 - 10.5 mg/dL Final   07/27/2020 9.7 8.7 - 10.5 mg/dL Final     Magnesium   Date Value Ref Range Status   07/28/2020 2.0 1.6 - 2.6 mg/dL Final     Phosphorus   Date Value Ref Range Status   07/28/2020 2.6 (L) 2.7 - 4.5 mg/dL Final     Alkaline Phosphatase   Date Value Ref Range Status   07/29/2020 89 55 - 135 U/L Final   07/28/2020 93 55 - 135 U/L Final   07/27/2020 88 55 - 135 U/L Final     ALT   Date Value Ref Range Status   07/29/2020 20 10 - 44 U/L Final   07/28/2020 16 10 - 44 U/L Final   07/27/2020 17 10 - 44 U/L Final     AST   Date Value Ref Range Status   07/29/2020 41 (H) 10 - 40 U/L Final   07/28/2020 29 10 - 40 U/L Final   07/27/2020 25 10 - 40 U/L Final       Assessment and Plan  85 y/o male with a medical history of HTN, HLD and arthritis presents as a new patient for a stroke follow up. Towards the end of July, patient was admitted to [facility] for dizziness. Although MRI brain w/o contrast did not reveal any acute infarct, CTA of the head and neck showed, short segment occlusion  involving the distal left vertebral artery with some calcified plaque present suggesting underlying stenosis. There is some distal reconstitution. Patient had good filling of the basilar artery. No acute thrombi in the posterior circulation as well as the anterior circulation.     Continue the ASA 81 mg indefinitely. Stroke risk factors and lifestyle changes discussed.     Can follow up in a year or sooner if needed.     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

## 2021-03-23 ENCOUNTER — PES CALL (OUTPATIENT)
Dept: ADMINISTRATIVE | Facility: CLINIC | Age: 86
End: 2021-03-23

## 2021-03-25 ENCOUNTER — PATIENT MESSAGE (OUTPATIENT)
Dept: ADMINISTRATIVE | Facility: HOSPITAL | Age: 86
End: 2021-03-25

## 2021-03-28 ENCOUNTER — TELEPHONE (OUTPATIENT)
Dept: ADMINISTRATIVE | Facility: CLINIC | Age: 86
End: 2021-03-28

## 2021-04-01 ENCOUNTER — PES CALL (OUTPATIENT)
Dept: ADMINISTRATIVE | Facility: CLINIC | Age: 86
End: 2021-04-01

## 2021-04-02 ENCOUNTER — PATIENT MESSAGE (OUTPATIENT)
Dept: FAMILY MEDICINE | Facility: CLINIC | Age: 86
End: 2021-04-02

## 2021-04-04 RX ORDER — ATORVASTATIN CALCIUM 40 MG/1
40 TABLET, FILM COATED ORAL NIGHTLY
Qty: 90 TABLET | Refills: 3 | Status: SHIPPED | OUTPATIENT
Start: 2021-04-04 | End: 2021-06-15 | Stop reason: SDUPTHER

## 2021-04-04 RX ORDER — HYDRALAZINE HYDROCHLORIDE 50 MG/1
50 TABLET, FILM COATED ORAL EVERY 12 HOURS
Qty: 180 TABLET | Refills: 3 | Status: SHIPPED | OUTPATIENT
Start: 2021-04-04 | End: 2021-06-15 | Stop reason: SDUPTHER

## 2021-04-04 RX ORDER — LOSARTAN POTASSIUM 100 MG/1
100 TABLET ORAL DAILY
Qty: 90 TABLET | Refills: 3 | Status: SHIPPED | OUTPATIENT
Start: 2021-04-04 | End: 2021-06-15 | Stop reason: SDUPTHER

## 2021-04-04 RX ORDER — AMLODIPINE BESYLATE 5 MG/1
5 TABLET ORAL 2 TIMES DAILY
Qty: 180 TABLET | Refills: 3 | Status: SHIPPED | OUTPATIENT
Start: 2021-04-04 | End: 2021-06-15 | Stop reason: SDUPTHER

## 2021-04-05 ENCOUNTER — PATIENT MESSAGE (OUTPATIENT)
Dept: FAMILY MEDICINE | Facility: CLINIC | Age: 86
End: 2021-04-05

## 2021-04-21 ENCOUNTER — TELEPHONE (OUTPATIENT)
Dept: ADMINISTRATIVE | Facility: CLINIC | Age: 86
End: 2021-04-21

## 2021-06-15 ENCOUNTER — PATIENT MESSAGE (OUTPATIENT)
Dept: FAMILY MEDICINE | Facility: CLINIC | Age: 86
End: 2021-06-15

## 2021-06-15 RX ORDER — AMLODIPINE BESYLATE 5 MG/1
5 TABLET ORAL 2 TIMES DAILY
Qty: 180 TABLET | Refills: 3 | Status: SHIPPED | OUTPATIENT
Start: 2021-06-15 | End: 2022-04-07

## 2021-06-15 RX ORDER — HYDRALAZINE HYDROCHLORIDE 50 MG/1
50 TABLET, FILM COATED ORAL EVERY 12 HOURS
Qty: 180 TABLET | Refills: 3 | Status: SHIPPED | OUTPATIENT
Start: 2021-06-15 | End: 2022-04-07

## 2021-06-15 RX ORDER — LOSARTAN POTASSIUM 100 MG/1
100 TABLET ORAL DAILY
Qty: 90 TABLET | Refills: 3 | Status: SHIPPED | OUTPATIENT
Start: 2021-06-15 | End: 2022-04-07

## 2021-06-15 RX ORDER — ATORVASTATIN CALCIUM 40 MG/1
40 TABLET, FILM COATED ORAL NIGHTLY
Qty: 90 TABLET | Refills: 3 | Status: SHIPPED | OUTPATIENT
Start: 2021-06-15 | End: 2024-03-08

## 2021-06-15 RX ORDER — ASPIRIN 81 MG/1
81 TABLET ORAL DAILY
Qty: 30 TABLET | Refills: 0 | OUTPATIENT
Start: 2021-06-15 | End: 2022-06-15

## 2021-06-22 ENCOUNTER — TELEPHONE (OUTPATIENT)
Dept: FAMILY MEDICINE | Facility: CLINIC | Age: 86
End: 2021-06-22

## 2021-08-14 ENCOUNTER — IMMUNIZATION (OUTPATIENT)
Dept: PRIMARY CARE CLINIC | Facility: CLINIC | Age: 86
End: 2021-08-14
Payer: MEDICARE

## 2021-08-14 DIAGNOSIS — Z23 NEED FOR VACCINATION: Primary | ICD-10-CM

## 2021-08-14 PROCEDURE — 0002A COVID-19, MRNA, LNP-S, PF, 30 MCG/0.3 ML DOSE VACCINE: ICD-10-PCS | Mod: CV19,S$GLB,, | Performed by: FAMILY MEDICINE

## 2021-08-14 PROCEDURE — 91300 COVID-19, MRNA, LNP-S, PF, 30 MCG/0.3 ML DOSE VACCINE: CPT | Mod: S$GLB,,, | Performed by: FAMILY MEDICINE

## 2021-08-14 PROCEDURE — 91300 COVID-19, MRNA, LNP-S, PF, 30 MCG/0.3 ML DOSE VACCINE: ICD-10-PCS | Mod: S$GLB,,, | Performed by: FAMILY MEDICINE

## 2021-08-14 PROCEDURE — 0002A COVID-19, MRNA, LNP-S, PF, 30 MCG/0.3 ML DOSE VACCINE: CPT | Mod: CV19,S$GLB,, | Performed by: FAMILY MEDICINE

## 2021-08-18 ENCOUNTER — PES CALL (OUTPATIENT)
Dept: ADMINISTRATIVE | Facility: CLINIC | Age: 86
End: 2021-08-18

## 2021-10-15 ENCOUNTER — OFFICE VISIT (OUTPATIENT)
Dept: HOME HEALTH SERVICES | Facility: CLINIC | Age: 86
End: 2021-10-15
Payer: MEDICARE

## 2021-10-15 VITALS
HEIGHT: 74 IN | TEMPERATURE: 98 F | HEART RATE: 52 BPM | DIASTOLIC BLOOD PRESSURE: 90 MMHG | BODY MASS INDEX: 25.03 KG/M2 | OXYGEN SATURATION: 99 % | SYSTOLIC BLOOD PRESSURE: 160 MMHG | WEIGHT: 195 LBS

## 2021-10-15 DIAGNOSIS — Z96.1 PSEUDOPHAKIA: ICD-10-CM

## 2021-10-15 DIAGNOSIS — H40.013 OPEN ANGLE WITH BORDERLINE FINDINGS, LOW RISK, BILATERAL: ICD-10-CM

## 2021-10-15 DIAGNOSIS — I10 ESSENTIAL HYPERTENSION: Chronic | ICD-10-CM

## 2021-10-15 DIAGNOSIS — Z00.00 ENCOUNTER FOR PREVENTIVE HEALTH EXAMINATION: Primary | ICD-10-CM

## 2021-10-15 DIAGNOSIS — G31.84 MILD COGNITIVE IMPAIRMENT WITH MEMORY LOSS: ICD-10-CM

## 2021-10-15 DIAGNOSIS — I63.012 CEREBROVASCULAR ACCIDENT (CVA) DUE TO THROMBOSIS OF LEFT VERTEBRAL ARTERY: ICD-10-CM

## 2021-10-15 DIAGNOSIS — I35.8 AORTIC VALVE SCLEROSIS: ICD-10-CM

## 2021-10-15 DIAGNOSIS — E78.00 HYPERCHOLESTEREMIA: Chronic | ICD-10-CM

## 2021-10-15 DIAGNOSIS — Z74.09 OTHER REDUCED MOBILITY: ICD-10-CM

## 2021-10-15 DIAGNOSIS — R97.20 ELEVATED PSA: ICD-10-CM

## 2021-10-15 DIAGNOSIS — R01.1 HEART MURMUR: ICD-10-CM

## 2021-10-15 PROCEDURE — G0439 PR MEDICARE ANNUAL WELLNESS SUBSEQUENT VISIT: ICD-10-PCS | Mod: S$GLB,,, | Performed by: NURSE PRACTITIONER

## 2021-10-15 PROCEDURE — 1126F PR PAIN SEVERITY QUANTIFIED, NO PAIN PRESENT: ICD-10-PCS | Mod: CPTII,S$GLB,, | Performed by: NURSE PRACTITIONER

## 2021-10-15 PROCEDURE — G9919 SCRN ND POS ND PROV OF REC: HCPCS | Mod: CPTII,S$GLB,, | Performed by: NURSE PRACTITIONER

## 2021-10-15 PROCEDURE — 1160F RVW MEDS BY RX/DR IN RCRD: CPT | Mod: CPTII,S$GLB,, | Performed by: NURSE PRACTITIONER

## 2021-10-15 PROCEDURE — 1159F MED LIST DOCD IN RCRD: CPT | Mod: CPTII,S$GLB,, | Performed by: NURSE PRACTITIONER

## 2021-10-15 PROCEDURE — G9919 PR SCREENING AND POSITIVE: ICD-10-PCS | Mod: CPTII,S$GLB,, | Performed by: NURSE PRACTITIONER

## 2021-10-15 PROCEDURE — G0439 PPPS, SUBSEQ VISIT: HCPCS | Mod: S$GLB,,, | Performed by: NURSE PRACTITIONER

## 2021-10-15 PROCEDURE — 1126F AMNT PAIN NOTED NONE PRSNT: CPT | Mod: CPTII,S$GLB,, | Performed by: NURSE PRACTITIONER

## 2021-10-15 PROCEDURE — 1101F PT FALLS ASSESS-DOCD LE1/YR: CPT | Mod: CPTII,S$GLB,, | Performed by: NURSE PRACTITIONER

## 2021-10-15 PROCEDURE — 1101F PR PT FALLS ASSESS DOC 0-1 FALLS W/OUT INJ PAST YR: ICD-10-PCS | Mod: CPTII,S$GLB,, | Performed by: NURSE PRACTITIONER

## 2021-10-15 PROCEDURE — 3288F PR FALLS RISK ASSESSMENT DOCUMENTED: ICD-10-PCS | Mod: CPTII,S$GLB,, | Performed by: NURSE PRACTITIONER

## 2021-10-15 PROCEDURE — 1160F PR REVIEW ALL MEDS BY PRESCRIBER/CLIN PHARMACIST DOCUMENTED: ICD-10-PCS | Mod: CPTII,S$GLB,, | Performed by: NURSE PRACTITIONER

## 2021-10-15 PROCEDURE — 1159F PR MEDICATION LIST DOCUMENTED IN MEDICAL RECORD: ICD-10-PCS | Mod: CPTII,S$GLB,, | Performed by: NURSE PRACTITIONER

## 2021-10-15 PROCEDURE — 3288F FALL RISK ASSESSMENT DOCD: CPT | Mod: CPTII,S$GLB,, | Performed by: NURSE PRACTITIONER

## 2021-10-15 RX ORDER — MAG HYDROX/ALUMINUM HYD/SIMETH 400-400-40
1 SUSPENSION, ORAL (FINAL DOSE FORM) ORAL DAILY
COMMUNITY

## 2021-10-15 RX ORDER — LORATADINE 10 MG/1
10 TABLET ORAL DAILY
COMMUNITY

## 2021-10-18 ENCOUNTER — PATIENT OUTREACH (OUTPATIENT)
Dept: ADMINISTRATIVE | Facility: OTHER | Age: 86
End: 2021-10-18

## 2021-10-19 NOTE — PROGRESS NOTES
I discussed with the patient options for social care programs based on their responses to the Social Determinants of Health questions. The patient has chosen to decline submitting a referral to a social care program.

## 2021-11-04 ENCOUNTER — LAB VISIT (OUTPATIENT)
Dept: LAB | Facility: HOSPITAL | Age: 86
End: 2021-11-04
Attending: INTERNAL MEDICINE
Payer: MEDICARE

## 2021-11-04 ENCOUNTER — OFFICE VISIT (OUTPATIENT)
Dept: FAMILY MEDICINE | Facility: CLINIC | Age: 86
End: 2021-11-04
Payer: MEDICARE

## 2021-11-04 VITALS
DIASTOLIC BLOOD PRESSURE: 86 MMHG | HEART RATE: 76 BPM | TEMPERATURE: 97 F | WEIGHT: 201.06 LBS | SYSTOLIC BLOOD PRESSURE: 128 MMHG | RESPIRATION RATE: 18 BRPM | BODY MASS INDEX: 25.81 KG/M2 | OXYGEN SATURATION: 97 %

## 2021-11-04 DIAGNOSIS — I10 ESSENTIAL HYPERTENSION: Chronic | ICD-10-CM

## 2021-11-04 DIAGNOSIS — G31.84 MILD COGNITIVE IMPAIRMENT WITH MEMORY LOSS: ICD-10-CM

## 2021-11-04 DIAGNOSIS — E78.00 HYPERCHOLESTEREMIA: Chronic | ICD-10-CM

## 2021-11-04 DIAGNOSIS — I63.012 CEREBROVASCULAR ACCIDENT (CVA) DUE TO THROMBOSIS OF LEFT VERTEBRAL ARTERY: Primary | ICD-10-CM

## 2021-11-04 LAB
ALBUMIN SERPL BCP-MCNC: 4.2 G/DL (ref 3.5–5.2)
ALP SERPL-CCNC: 299 U/L (ref 55–135)
ALT SERPL W/O P-5'-P-CCNC: 96 U/L (ref 10–44)
ANION GAP SERPL CALC-SCNC: 11 MMOL/L (ref 8–16)
AST SERPL-CCNC: 61 U/L (ref 10–40)
BASOPHILS # BLD AUTO: 0.04 K/UL (ref 0–0.2)
BASOPHILS NFR BLD: 0.6 % (ref 0–1.9)
BILIRUB SERPL-MCNC: 0.7 MG/DL (ref 0.1–1)
BUN SERPL-MCNC: 13 MG/DL (ref 8–23)
CALCIUM SERPL-MCNC: 10 MG/DL (ref 8.7–10.5)
CHLORIDE SERPL-SCNC: 99 MMOL/L (ref 95–110)
CHOLEST SERPL-MCNC: 152 MG/DL (ref 120–199)
CHOLEST/HDLC SERPL: 3.9 {RATIO} (ref 2–5)
CO2 SERPL-SCNC: 29 MMOL/L (ref 23–29)
CREAT SERPL-MCNC: 0.8 MG/DL (ref 0.5–1.4)
DIFFERENTIAL METHOD: ABNORMAL
EOSINOPHIL # BLD AUTO: 0.2 K/UL (ref 0–0.5)
EOSINOPHIL NFR BLD: 2.1 % (ref 0–8)
ERYTHROCYTE [DISTWIDTH] IN BLOOD BY AUTOMATED COUNT: 16.4 % (ref 11.5–14.5)
EST. GFR  (AFRICAN AMERICAN): >60 ML/MIN/1.73 M^2
EST. GFR  (NON AFRICAN AMERICAN): >60 ML/MIN/1.73 M^2
GLUCOSE SERPL-MCNC: 100 MG/DL (ref 70–110)
HCT VFR BLD AUTO: 42.2 % (ref 40–54)
HDLC SERPL-MCNC: 39 MG/DL (ref 40–75)
HDLC SERPL: 25.7 % (ref 20–50)
HGB BLD-MCNC: 13.1 G/DL (ref 14–18)
IMM GRANULOCYTES # BLD AUTO: 0.02 K/UL (ref 0–0.04)
IMM GRANULOCYTES NFR BLD AUTO: 0.3 % (ref 0–0.5)
LDLC SERPL CALC-MCNC: 94.2 MG/DL (ref 63–159)
LYMPHOCYTES # BLD AUTO: 3.2 K/UL (ref 1–4.8)
LYMPHOCYTES NFR BLD: 43.8 % (ref 18–48)
MCH RBC QN AUTO: 27.5 PG (ref 27–31)
MCHC RBC AUTO-ENTMCNC: 31 G/DL (ref 32–36)
MCV RBC AUTO: 89 FL (ref 82–98)
MONOCYTES # BLD AUTO: 0.7 K/UL (ref 0.3–1)
MONOCYTES NFR BLD: 9.1 % (ref 4–15)
NEUTROPHILS # BLD AUTO: 3.2 K/UL (ref 1.8–7.7)
NEUTROPHILS NFR BLD: 44.1 % (ref 38–73)
NONHDLC SERPL-MCNC: 113 MG/DL
NRBC BLD-RTO: 0 /100 WBC
PLATELET # BLD AUTO: 334 K/UL (ref 150–450)
PMV BLD AUTO: 11.2 FL (ref 9.2–12.9)
POTASSIUM SERPL-SCNC: 4.3 MMOL/L (ref 3.5–5.1)
PROT SERPL-MCNC: 8.5 G/DL (ref 6–8.4)
RBC # BLD AUTO: 4.77 M/UL (ref 4.6–6.2)
SODIUM SERPL-SCNC: 139 MMOL/L (ref 136–145)
TRIGL SERPL-MCNC: 94 MG/DL (ref 30–150)
WBC # BLD AUTO: 7.23 K/UL (ref 3.9–12.7)

## 2021-11-04 PROCEDURE — 1101F PR PT FALLS ASSESS DOC 0-1 FALLS W/OUT INJ PAST YR: ICD-10-PCS | Mod: HCNC,CPTII,S$GLB, | Performed by: INTERNAL MEDICINE

## 2021-11-04 PROCEDURE — 1159F MED LIST DOCD IN RCRD: CPT | Mod: HCNC,CPTII,S$GLB, | Performed by: INTERNAL MEDICINE

## 2021-11-04 PROCEDURE — 99214 OFFICE O/P EST MOD 30 MIN: CPT | Mod: HCNC,S$GLB,, | Performed by: INTERNAL MEDICINE

## 2021-11-04 PROCEDURE — 99499 RISK ADDL DX/OHS AUDIT: ICD-10-PCS | Mod: S$PBB,,, | Performed by: INTERNAL MEDICINE

## 2021-11-04 PROCEDURE — G0008 ADMIN INFLUENZA VIRUS VAC: HCPCS | Mod: HCNC,S$GLB,, | Performed by: INTERNAL MEDICINE

## 2021-11-04 PROCEDURE — 99214 PR OFFICE/OUTPT VISIT, EST, LEVL IV, 30-39 MIN: ICD-10-PCS | Mod: HCNC,S$GLB,, | Performed by: INTERNAL MEDICINE

## 2021-11-04 PROCEDURE — 85025 COMPLETE CBC W/AUTO DIFF WBC: CPT | Mod: HCNC | Performed by: INTERNAL MEDICINE

## 2021-11-04 PROCEDURE — 90694 VACC AIIV4 NO PRSRV 0.5ML IM: CPT | Mod: HCNC,S$GLB,, | Performed by: INTERNAL MEDICINE

## 2021-11-04 PROCEDURE — 1101F PT FALLS ASSESS-DOCD LE1/YR: CPT | Mod: HCNC,CPTII,S$GLB, | Performed by: INTERNAL MEDICINE

## 2021-11-04 PROCEDURE — 1126F PR PAIN SEVERITY QUANTIFIED, NO PAIN PRESENT: ICD-10-PCS | Mod: HCNC,CPTII,S$GLB, | Performed by: INTERNAL MEDICINE

## 2021-11-04 PROCEDURE — 99999 PR PBB SHADOW E&M-EST. PATIENT-LVL IV: ICD-10-PCS | Mod: PBBFAC,HCNC,, | Performed by: INTERNAL MEDICINE

## 2021-11-04 PROCEDURE — 1159F PR MEDICATION LIST DOCUMENTED IN MEDICAL RECORD: ICD-10-PCS | Mod: HCNC,CPTII,S$GLB, | Performed by: INTERNAL MEDICINE

## 2021-11-04 PROCEDURE — 90694 FLU VACCINE - QUADRIVALENT - ADJUVANTED: ICD-10-PCS | Mod: HCNC,S$GLB,, | Performed by: INTERNAL MEDICINE

## 2021-11-04 PROCEDURE — 80053 COMPREHEN METABOLIC PANEL: CPT | Mod: HCNC | Performed by: INTERNAL MEDICINE

## 2021-11-04 PROCEDURE — 80061 LIPID PANEL: CPT | Mod: HCNC | Performed by: INTERNAL MEDICINE

## 2021-11-04 PROCEDURE — G0008 FLU VACCINE - QUADRIVALENT - ADJUVANTED: ICD-10-PCS | Mod: HCNC,S$GLB,, | Performed by: INTERNAL MEDICINE

## 2021-11-04 PROCEDURE — 36415 COLL VENOUS BLD VENIPUNCTURE: CPT | Mod: HCNC,PO | Performed by: INTERNAL MEDICINE

## 2021-11-04 PROCEDURE — 1126F AMNT PAIN NOTED NONE PRSNT: CPT | Mod: HCNC,CPTII,S$GLB, | Performed by: INTERNAL MEDICINE

## 2021-11-04 PROCEDURE — 3288F FALL RISK ASSESSMENT DOCD: CPT | Mod: HCNC,CPTII,S$GLB, | Performed by: INTERNAL MEDICINE

## 2021-11-04 PROCEDURE — 99499 UNLISTED E&M SERVICE: CPT | Mod: S$PBB,,, | Performed by: INTERNAL MEDICINE

## 2021-11-04 PROCEDURE — 3288F PR FALLS RISK ASSESSMENT DOCUMENTED: ICD-10-PCS | Mod: HCNC,CPTII,S$GLB, | Performed by: INTERNAL MEDICINE

## 2021-11-04 PROCEDURE — 99999 PR PBB SHADOW E&M-EST. PATIENT-LVL IV: CPT | Mod: PBBFAC,HCNC,, | Performed by: INTERNAL MEDICINE

## 2021-11-05 ENCOUNTER — TELEPHONE (OUTPATIENT)
Dept: FAMILY MEDICINE | Facility: CLINIC | Age: 86
End: 2021-11-05
Payer: MEDICARE

## 2021-11-05 DIAGNOSIS — R79.89 LFT ELEVATION: Primary | ICD-10-CM

## 2021-11-18 ENCOUNTER — PATIENT OUTREACH (OUTPATIENT)
Dept: ADMINISTRATIVE | Facility: OTHER | Age: 86
End: 2021-11-18
Payer: MEDICARE

## 2022-04-06 NOTE — TELEPHONE ENCOUNTER
No new care gaps identified.  Powered by [employer]. Reference number: [id number].   4/06/2022 3:56:36 AM CDT

## 2022-04-07 RX ORDER — HYDRALAZINE HYDROCHLORIDE 50 MG/1
TABLET, FILM COATED ORAL
Qty: 180 TABLET | Refills: 3 | Status: SHIPPED | OUTPATIENT
Start: 2022-04-07 | End: 2023-01-23 | Stop reason: SDUPTHER

## 2022-04-07 RX ORDER — AMLODIPINE BESYLATE 5 MG/1
TABLET ORAL
Qty: 180 TABLET | Refills: 2 | Status: SHIPPED | OUTPATIENT
Start: 2022-04-07 | End: 2022-07-12

## 2022-04-07 RX ORDER — LOSARTAN POTASSIUM 100 MG/1
TABLET ORAL
Qty: 90 TABLET | Refills: 2 | Status: SHIPPED | OUTPATIENT
Start: 2022-04-07 | End: 2022-11-14

## 2022-04-07 NOTE — TELEPHONE ENCOUNTER
Refill Routing Note   Medication(s) are not appropriate for processing by [facility] for the following reason(s):      - Outside of protocol    [facility] action(s):  Approve  Route          Medication reconciliation completed: No     Appointments  past 12m or future 3m with PCP    Date Provider   Last Visit   11/4/2021 Luis E Leach MD   Next Visit   Visit date not found Luis E Leach MD   ED visits in past 90 days: 0        Note composed:12:26 PM 04/07/2022

## 2022-06-17 ENCOUNTER — TELEPHONE (OUTPATIENT)
Dept: ADMINISTRATIVE | Facility: HOSPITAL | Age: 87
End: 2022-06-17
Payer: MEDICARE

## 2022-06-21 ENCOUNTER — OFFICE VISIT (OUTPATIENT)
Dept: FAMILY MEDICINE | Facility: CLINIC | Age: 87
End: 2022-06-21
Payer: MEDICARE

## 2022-06-21 VITALS
SYSTOLIC BLOOD PRESSURE: 175 MMHG | TEMPERATURE: 98 F | OXYGEN SATURATION: 98 % | BODY MASS INDEX: 25.47 KG/M2 | HEART RATE: 81 BPM | HEIGHT: 74 IN | DIASTOLIC BLOOD PRESSURE: 103 MMHG | RESPIRATION RATE: 20 BRPM | WEIGHT: 198.44 LBS

## 2022-06-21 DIAGNOSIS — E78.00 HYPERCHOLESTEREMIA: Chronic | ICD-10-CM

## 2022-06-21 DIAGNOSIS — I63.012 CEREBROVASCULAR ACCIDENT (CVA) DUE TO THROMBOSIS OF LEFT VERTEBRAL ARTERY: ICD-10-CM

## 2022-06-21 DIAGNOSIS — Z00.00 ENCOUNTER FOR PREVENTIVE HEALTH EXAMINATION: Primary | ICD-10-CM

## 2022-06-21 DIAGNOSIS — I10 ESSENTIAL HYPERTENSION: Chronic | ICD-10-CM

## 2022-06-21 DIAGNOSIS — I35.8 AORTIC VALVE SCLEROSIS: ICD-10-CM

## 2022-06-21 DIAGNOSIS — G31.84 MILD COGNITIVE IMPAIRMENT WITH MEMORY LOSS: ICD-10-CM

## 2022-06-21 DIAGNOSIS — R97.20 ELEVATED PSA: ICD-10-CM

## 2022-06-21 PROCEDURE — 1159F MED LIST DOCD IN RCRD: CPT | Mod: CPTII,S$GLB,, | Performed by: NURSE PRACTITIONER

## 2022-06-21 PROCEDURE — 1170F FXNL STATUS ASSESSED: CPT | Mod: CPTII,S$GLB,, | Performed by: NURSE PRACTITIONER

## 2022-06-21 PROCEDURE — 3288F PR FALLS RISK ASSESSMENT DOCUMENTED: ICD-10-PCS | Mod: CPTII,S$GLB,, | Performed by: NURSE PRACTITIONER

## 2022-06-21 PROCEDURE — G9919 SCRN ND POS ND PROV OF REC: HCPCS | Mod: CPTII,S$GLB,, | Performed by: NURSE PRACTITIONER

## 2022-06-21 PROCEDURE — 1101F PR PT FALLS ASSESS DOC 0-1 FALLS W/OUT INJ PAST YR: ICD-10-PCS | Mod: CPTII,S$GLB,, | Performed by: NURSE PRACTITIONER

## 2022-06-21 PROCEDURE — 1126F PR PAIN SEVERITY QUANTIFIED, NO PAIN PRESENT: ICD-10-PCS | Mod: CPTII,S$GLB,, | Performed by: NURSE PRACTITIONER

## 2022-06-21 PROCEDURE — 99999 PR PBB SHADOW E&M-EST. PATIENT-LVL V: ICD-10-PCS | Mod: PBBFAC,,, | Performed by: NURSE PRACTITIONER

## 2022-06-21 PROCEDURE — 1160F PR REVIEW ALL MEDS BY PRESCRIBER/CLIN PHARMACIST DOCUMENTED: ICD-10-PCS | Mod: CPTII,S$GLB,, | Performed by: NURSE PRACTITIONER

## 2022-06-21 PROCEDURE — 3288F FALL RISK ASSESSMENT DOCD: CPT | Mod: CPTII,S$GLB,, | Performed by: NURSE PRACTITIONER

## 2022-06-21 PROCEDURE — 99999 PR PBB SHADOW E&M-EST. PATIENT-LVL V: CPT | Mod: PBBFAC,,, | Performed by: NURSE PRACTITIONER

## 2022-06-21 PROCEDURE — 1159F PR MEDICATION LIST DOCUMENTED IN MEDICAL RECORD: ICD-10-PCS | Mod: CPTII,S$GLB,, | Performed by: NURSE PRACTITIONER

## 2022-06-21 PROCEDURE — 1170F PR FUNCTIONAL STATUS ASSESSED: ICD-10-PCS | Mod: CPTII,S$GLB,, | Performed by: NURSE PRACTITIONER

## 2022-06-21 PROCEDURE — 1160F RVW MEDS BY RX/DR IN RCRD: CPT | Mod: CPTII,S$GLB,, | Performed by: NURSE PRACTITIONER

## 2022-06-21 PROCEDURE — G0439 PR MEDICARE ANNUAL WELLNESS SUBSEQUENT VISIT: ICD-10-PCS | Mod: S$GLB,,, | Performed by: NURSE PRACTITIONER

## 2022-06-21 PROCEDURE — 1101F PT FALLS ASSESS-DOCD LE1/YR: CPT | Mod: CPTII,S$GLB,, | Performed by: NURSE PRACTITIONER

## 2022-06-21 PROCEDURE — G0439 PPPS, SUBSEQ VISIT: HCPCS | Mod: S$GLB,,, | Performed by: NURSE PRACTITIONER

## 2022-06-21 PROCEDURE — 1126F AMNT PAIN NOTED NONE PRSNT: CPT | Mod: CPTII,S$GLB,, | Performed by: NURSE PRACTITIONER

## 2022-06-21 PROCEDURE — G9919 PR SCREENING AND POSITIVE: ICD-10-PCS | Mod: CPTII,S$GLB,, | Performed by: NURSE PRACTITIONER

## 2022-06-21 NOTE — PATIENT INSTRUCTIONS
Counseling and Referral of Other Preventative  (Italic type indicates deductible and co-insurance are waived)    Patient Name: Chinmay Mei  Today's Date: 6/21/2022    Health Maintenance       Date Due Completion Date    TETANUS VACCINE Never done ---    Shingles Vaccine (1 of 2) Never done ---    Pneumococcal Vaccines (Age 65+) (1 - PCV) Never done ---    COVID-19 Vaccine (3 - Booster for Pfizer series) 01/14/2022 8/14/2021    Lipid Panel 11/04/2026 11/4/2021        No orders of the defined types were placed in this encounter.    The following information is provided to all patients.  This information is to help you find resources for any of the problems found today that may be affecting your health:                Living healthy guide: www.[facility].[place].[facility]      Understanding Diabetes: www.diabetes.org      Eating healthy: www.cdc.gov/healthyweight      [facility] home safety checklist: www.cdc.gov/steadi/patient.html      Agency on Aging: www.goea.[place].[facility]      Alcoholics anonymous (AA): www.aa.org      Physical Activity: www.[place].nih.gov/id1ykct      Tobacco use: www.quitwithusla.org     Counseling and Referral of Other Preventative  (Italic type indicates deductible and co-insurance are waived)    Patient Name: Chinmay Mei  Today's Date: 6/21/2022    Health Maintenance       Date Due Completion Date    TETANUS VACCINE Never done ---    Shingles Vaccine (1 of 2) Never done ---    Pneumococcal Vaccines (Age 65+) (1 - PCV) Never done ---    COVID-19 Vaccine (3 - Booster for Pfizer series) 01/14/2022 8/14/2021    Lipid Panel 11/04/2026 11/4/2021        No orders of the defined types were placed in this encounter.    The following information is provided to all patients.  This information is to help you find resources for any of the problems found today that may be affecting your health:                Living healthy guide: www.[facility].[place].[facility]      Understanding Diabetes: www.diabetes.org      Eating healthy:  www.cdc.gov/healthyweight      CDC home safety checklist: www.cdc.gov/steadi/patient.html      Agency on Aging: www.goea.[place].gov      Alcoholics anonymous (AA): www.aa.org      Physical Activity: www.[place].nih.gov/wr7rpuz      Tobacco use: www.quitwithusla.org

## 2022-06-21 NOTE — PROGRESS NOTES
"  Chinmay Mei presented for a  Medicare AWV and comprehensive Health Risk Assessment today. The following components were reviewed and updated:    · Medical history  · Family History  · Social history  · Allergies and Current Medications  · Health Risk Assessment  · Health Maintenance  · Care Team     Patient screened moderate and/or high risk for one or more social determinants of health (SDOH). Patient connected to community resources through the ED Navigator.      ** See Completed Assessments for Annual Wellness Visit within the encounter summary.**         The following assessments were completed:  · Living Situation  · CAGE  · Depression Screening  · Timed Get Up and Go  · Whisper Test  · Cognitive Function Screening  · Nutrition Screening  · ADL Screening  · PAQ Screening        Vitals:    06/21/22 1113   BP: (!) 175/103   Pulse: 81   Resp: 20   Temp: 98 °F (36.7 °C)   SpO2: 98%   Weight: 90 kg (198 lb 6.6 oz)   Height: 6' 2" (1.88 m)     Body mass index is 25.47 kg/m².  Physical Exam  Vitals and nursing note reviewed.   Constitutional:       General: He is not in acute distress.     Appearance: He is well-developed.   HENT:      Head: Normocephalic and atraumatic.   Eyes:      Pupils: Pupils are equal, round, and reactive to light.   Neck:      Vascular: No carotid bruit.   Cardiovascular:      Rate and Rhythm: Normal rate and regular rhythm.      Pulses: Normal pulses.      Heart sounds: Normal heart sounds. No murmur heard.    No gallop.   Pulmonary:      Effort: Pulmonary effort is normal.      Breath sounds: Normal breath sounds.   Abdominal:      General: Bowel sounds are normal. There is no distension.      Palpations: Abdomen is soft.      Tenderness: There is no abdominal tenderness.   Musculoskeletal:         General: Normal range of motion.   Skin:     General: Skin is warm and dry.   Neurological:      Motor: No abnormal muscle tone.      Gait: Gait normal.   Psychiatric:         Mood and Affect: " Affect is flat.         Speech: Speech normal.         Behavior: Behavior normal.         Cognition and Memory: Cognition is impaired. Memory is impaired.       Current Outpatient Medications   Medication Instructions    amlodipine (NORVASC) 5 MG tablet TAKE 1 TABLET TWICE DAILY    aspirin (ECOTRIN) 81 mg, Oral, Daily    atorvastatin (LIPITOR) 40 mg, Oral, Nightly    ELDERBERRY FRUIT ORAL 2 tablets, Oral, Daily    hydralazine (APRESOLINE) 50 MG tablet TAKE 1 TABLET EVERY 12 HOURS    loratadine (CLARITIN) 10 mg, Oral, Daily    losartan (COZAAR) 100 MG tablet TAKE 1 TABLET EVERY DAY    saw palmetto 450 mg Cap 1 capsule, Oral, Daily             Diagnoses and health risks identified today and associated recommendations/orders:    1. Encounter for preventive health examination]   due for covid booster   due for pneumonia vaccine    2. Mild cognitive impairment with memory loss  Chronic and Stable. Wife/dtg present -states  Pt is forgetful with short term memory but has no changes since dx 2019 . Decline to to take Aricept  Score 1/5 on cognation screening    3. Cerebrovascular accident (CVA) due to thrombosis of left vertebral artery  Acute ischemic stroke 7/27/2020   Chronic and Ongoing. BP elevated -see # 4     4. Essential hypertension  Chronic and Ongoing. BP elevated   Reinforce with pt/wife to takes med's as directed - yuliet And record  Jay scheduled BP check in  1 month with NP  Annual with PCP  In 11/2022    5. Hypercholesteremia  Chronic and Stable. Continue current treatment plan as previously prescribed with your PCP    6. Aortic valve sclerosis  Chronic and Stable. Continue current treatment plan as previously prescribed with your PCP    7. Elevated PSA  Chronic and Ongoing- Follow up  With PCP    I offered to discuss advanced care planning, including how to pick a person who would make decisions for you if you were unable to make them for yourself, called a health care power of , and what  kind of decisions you might make such as use of life sustaining treatments such as ventilators and tube feeding when faced with a life limiting illness recorded on a living will that they will need to know. (How you want to be cared for as you near the end of your natural life)     X  Patient is unable to engage in a discussion regarding advanced directives due to severe physical and/or cognitive impairment.    Provided Chinmay with a 5-10 year written screening schedule and personal prevention plan. Recommendations were developed using the USPSTF age appropriate recommendations. Education, counseling, and referrals were provided as needed. After Visit Summary printed and given to patient which includes a list of additional screenings\tests needed.     1 year annual wellness  Luna cMclelland NP

## 2022-06-21 NOTE — Clinical Note
Your patient was seen today for a HRA visit.   BP elevated - a month follow up is scheduled... I have included a copy of my visit note, please review the note and feel free to contact me with any questions.  Thank you for allowing me to participate in the care of your patients.  Luna Mcclelland NP

## 2022-07-05 NOTE — PROGRESS NOTES
Subjective:       Patient ID: Chinmay eMi is a 88 y.o. Black or  male here today for:  Blood Pressure Check      PCP:  Luis E Leach MD --- date of last visit 11/4/2021  The patient's last visit with me:  None    HPI    Hypertension  This is a chronic problem. The problem is controlled. Associated symptoms include peripheral edema (cayetano feet/ankles). Pertinent negatives include no anxiety, blurred vision, chest pain, headaches, malaise/fatigue, neck pain, orthopnea, palpitations, PND, shortness of breath or sweats. Past treatments include calcium channel blockers, angiotensin blockers and direct vasodilators. There are no compliance problems.        BP Readings from Last 6 Encounters:   07/12/22 130/74   06/21/22 (!) 175/103   11/04/21 128/86   10/15/21 (!) 160/90   08/25/20 (!) 141/94   07/30/20 (!) 148/76       Hypertension Medications             amLODIPine (NORVASC) 5 MG tablet TAKE 1 TABLET TWICE DAILY    hydrALAZINE (APRESOLINE) 50 MG tablet TAKE 1 TABLET EVERY 12 HOURS    losartan (COZAAR) 100 MG tablet TAKE 1 TABLET EVERY DAY           Review of Systems   Constitutional: Negative.  Negative for malaise/fatigue.   Eyes: Negative for blurred vision, photophobia, pain and visual disturbance.   Respiratory: Negative.  Negative for choking, chest tightness and shortness of breath.    Cardiovascular: Positive for leg swelling. Negative for chest pain, palpitations, orthopnea and PND.        Does follow a low-salt diet, on rx as ordered.  Taking his CCB twice daily (with c/o edema).  No home bp readings to report.   Musculoskeletal: Negative for neck pain.   Neurological: Negative.  Negative for tremors, syncope, weakness, light-headedness, numbness and headaches.       Review of patient's allergies indicates:   Allergen Reactions    Aspirin      bleeding       Patient Active Problem List   Diagnosis    Essential hypertension    Hypercholesteremia    Heart murmur    Elevated PSA     "Open angle with borderline findings, low risk, bilateral    Pseudophakia    Aortic valve sclerosis    Dizziness    Cerebrovascular accident (CVA) due to thrombosis of left vertebral artery         Current Outpatient Medications:     amLODIPine (NORVASC) 5 MG tablet, TAKE 1 TABLET TWICE DAILY, Disp: 180 tablet, Rfl: 2    aspirin (ECOTRIN) 81 MG EC tablet, Take 1 tablet (81 mg total) by mouth once daily., Disp: 30 tablet, Rfl: 0    atorvastatin (LIPITOR) 40 MG tablet, Take 1 tablet (40 mg total) by mouth every evening., Disp: 90 tablet, Rfl: 3    ELDERBERRY FRUIT ORAL, Take 2 tablets by mouth once daily., Disp: , Rfl:     hydrALAZINE (APRESOLINE) 50 MG tablet, TAKE 1 TABLET EVERY 12 HOURS, Disp: 180 tablet, Rfl: 3    loratadine (CLARITIN) 10 mg tablet, Take 10 mg by mouth once daily., Disp: , Rfl:     losartan (COZAAR) 100 MG tablet, TAKE 1 TABLET EVERY DAY, Disp: 90 tablet, Rfl: 2    saw palmetto 450 mg Cap, Take 1 capsule by mouth once daily., Disp: , Rfl:     Past medical, surgical, family and social histories have been reviewed today.      Objective:     Vitals:    22 1142   BP: 130/74   Pulse: 91   Temp: 97.6 °F (36.4 °C)   SpO2: 97%   Weight: 90.3 kg (199 lb 1.2 oz)   Height: 6' 2" (1.88 m)   PainSc: 0-No pain       Physical Exam  Vitals reviewed.   Constitutional:       General: He is not in acute distress.  Eyes:      Pupils: Pupils are equal, round, and reactive to light.   Cardiovascular:      Rate and Rhythm: Normal rate. Rhythm irregular.      Pulses: Normal pulses.      Heart sounds: Murmur heard.      Comments: Edema noted to BLE but he reports has improved over past few days.  Pulmonary:      Effort: Pulmonary effort is normal.      Breath sounds: Normal breath sounds.   Musculoskeletal:         General: Normal range of motion.      Cervical back: Normal range of motion and neck supple. No rigidity.      Right lower le+ Edema present.      Left lower le+ Edema present. "   Skin:     Capillary Refill: Capillary refill takes less than 2 seconds.   Neurological:      Mental Status: He is alert and oriented to person, place, and time.      Motor: No weakness.      Gait: Gait normal.   Psychiatric:         Mood and Affect: Mood normal.         Behavior: Behavior normal.         Thought Content: Thought content normal.         Judgment: Judgment normal.       Results for orders placed or performed in visit on 07/12/22   IN OFFICE EKG 12-LEAD (to Garber)    Collection Time: 07/12/22 12:11 PM    Narrative    Test Reason : I10,I49.9,    Vent. Rate : 064 BPM     Atrial Rate : 064 BPM     P-R Int : 168 ms          QRS Dur : 096 ms      QT Int : 394 ms       P-R-T Axes : 084 039 088 degrees     QTc Int : 406 ms    Sinus rhythm with Premature atrial complexes  Nonspecific T wave abnormality  Abnormal ECG  When compared with ECG of 27-JUL-2020 16:28,  Premature ventricular complexes are no longer Present  Premature atrial complexes are now Present  Confirmed by DEMETRI WIGGINS MD ([id number]) on 7/12/2022 8:10:07 PM    Referred By:  YEN           Confirmed By:DEMETRI WIGGINS MD       Diagnosis/Assessment:     1. Essential hypertension ---- controlled and stable today in office.  But, due to the edema, I have decreased his dose from 5 mg bid to once daily to see if this helps.  He thinks swelling may have started or at least worsened when rx was increased to bid dosing.  Continue the ARB and vasodilator as taking now.  Elevate legs, hydrate appropriately.  DASH/heart healthy diet, monitor home bp.  - IN OFFICE EKG 12-LEAD (to Garber)  - Ambulatory referral/consult to Cardiology; Future    2. Irregularly irregular cardiac rhythm --- due to # 3  - IN OFFICE EKG 12-LEAD (to Muse)    3. Premature atrial contractions  - Ambulatory referral/consult to Cardiology; Future    4. Aortic valve stenosis, etiology of cardiac valve disease unspecified ---- with significant murmur, no c/o sob/syncope/dizziness.  To  Cardiology for further evaluation.  - Ambulatory referral/consult to Cardiology; Future       Follow-up:     RTC as directed or on prn basis.        GEMA Holt  [facility]       Patient Care Team:  Luis E Leach MD as PCP - General (Internal Medicine)      40 minutes of total time spent on the encounter, which includes face to face time and non-face to face time preparing to see the patient (eg, review of tests), obtaining and/or reviewing separately obtained history, and documenting clinical information in the electronic or other health record.  Also includes independent interpretation of results (not separately reported) and communicating results to the patient/family/caregiver, with care coordination (not separately reported).

## 2022-07-12 ENCOUNTER — TELEPHONE (OUTPATIENT)
Dept: FAMILY MEDICINE | Facility: CLINIC | Age: 87
End: 2022-07-12
Payer: MEDICARE

## 2022-07-12 ENCOUNTER — OFFICE VISIT (OUTPATIENT)
Dept: FAMILY MEDICINE | Facility: CLINIC | Age: 87
End: 2022-07-12
Payer: MEDICARE

## 2022-07-12 VITALS
TEMPERATURE: 98 F | DIASTOLIC BLOOD PRESSURE: 74 MMHG | WEIGHT: 199.06 LBS | OXYGEN SATURATION: 97 % | SYSTOLIC BLOOD PRESSURE: 130 MMHG | HEART RATE: 91 BPM | BODY MASS INDEX: 25.55 KG/M2 | HEIGHT: 74 IN

## 2022-07-12 DIAGNOSIS — I35.0 AORTIC VALVE STENOSIS, ETIOLOGY OF CARDIAC VALVE DISEASE UNSPECIFIED: ICD-10-CM

## 2022-07-12 DIAGNOSIS — I49.1 PREMATURE ATRIAL CONTRACTIONS: ICD-10-CM

## 2022-07-12 DIAGNOSIS — I10 ESSENTIAL HYPERTENSION: Primary | Chronic | ICD-10-CM

## 2022-07-12 DIAGNOSIS — I49.9 IRREGULARLY IRREGULAR CARDIAC RHYTHM: ICD-10-CM

## 2022-07-12 PROCEDURE — 99215 OFFICE O/P EST HI 40 MIN: CPT | Mod: 25,S$GLB,, | Performed by: REGISTERED NURSE

## 2022-07-12 PROCEDURE — 1101F PR PT FALLS ASSESS DOC 0-1 FALLS W/OUT INJ PAST YR: ICD-10-PCS | Mod: CPTII,S$GLB,, | Performed by: REGISTERED NURSE

## 2022-07-12 PROCEDURE — 99999 PR PBB SHADOW E&M-EST. PATIENT-LVL IV: ICD-10-PCS | Mod: PBBFAC,,, | Performed by: REGISTERED NURSE

## 2022-07-12 PROCEDURE — 99999 PR PBB SHADOW E&M-EST. PATIENT-LVL IV: CPT | Mod: PBBFAC,,, | Performed by: REGISTERED NURSE

## 2022-07-12 PROCEDURE — 93010 ELECTROCARDIOGRAM REPORT: CPT | Mod: S$GLB,,, | Performed by: INTERNAL MEDICINE

## 2022-07-12 PROCEDURE — 93010 EKG 12-LEAD: ICD-10-PCS | Mod: S$GLB,,, | Performed by: INTERNAL MEDICINE

## 2022-07-12 PROCEDURE — 93005 EKG 12-LEAD: ICD-10-PCS | Mod: S$GLB,,, | Performed by: REGISTERED NURSE

## 2022-07-12 PROCEDURE — 3288F FALL RISK ASSESSMENT DOCD: CPT | Mod: CPTII,S$GLB,, | Performed by: REGISTERED NURSE

## 2022-07-12 PROCEDURE — 93005 ELECTROCARDIOGRAM TRACING: CPT | Mod: S$GLB,,, | Performed by: REGISTERED NURSE

## 2022-07-12 PROCEDURE — 99215 PR OFFICE/OUTPT VISIT, EST, LEVL V, 40-54 MIN: ICD-10-PCS | Mod: 25,S$GLB,, | Performed by: REGISTERED NURSE

## 2022-07-12 PROCEDURE — 1101F PT FALLS ASSESS-DOCD LE1/YR: CPT | Mod: CPTII,S$GLB,, | Performed by: REGISTERED NURSE

## 2022-07-12 PROCEDURE — 3288F PR FALLS RISK ASSESSMENT DOCUMENTED: ICD-10-PCS | Mod: CPTII,S$GLB,, | Performed by: REGISTERED NURSE

## 2022-07-12 PROCEDURE — 1126F PR PAIN SEVERITY QUANTIFIED, NO PAIN PRESENT: ICD-10-PCS | Mod: CPTII,S$GLB,, | Performed by: REGISTERED NURSE

## 2022-07-12 PROCEDURE — 1126F AMNT PAIN NOTED NONE PRSNT: CPT | Mod: CPTII,S$GLB,, | Performed by: REGISTERED NURSE

## 2022-07-12 RX ORDER — AMLODIPINE BESYLATE 5 MG/1
5 TABLET ORAL DAILY
Qty: 90 TABLET | Refills: 0
Start: 2022-07-12 | End: 2022-11-08 | Stop reason: SDUPTHER

## 2022-07-12 NOTE — TELEPHONE ENCOUNTER
----- Message from Aysha Chandler sent at 7/12/2022 10:59 AM CDT -----  Contact: Chinmay  Patient is calling to speak to the nurse regarding todays appt. Reports going to be 20 mins late. Please give patient a call back ..[phone] to notify if need to reschedule.

## 2022-07-15 NOTE — PROGRESS NOTES
CHW - Initial Contact    This Community Health Worker completed OR updated the Social Determinant of Health questionnaire with Cally Samayoa by telephone today.    Pt identified barriers of most importance are: Pantry food.  Referrals to community agencies completed with patient/caregiver consent outside of [facility] include: Yes  Referrals were put through [facility] - No  Support and Services: Food Pantry  Other information discussed the patient needs / wants help with: SDOH   Follow up required:Yes   Follow-up Outreach, Initial Outreach - Due: 7/26/2022, 7/18/2022

## 2022-07-22 ENCOUNTER — OFFICE VISIT (OUTPATIENT)
Dept: CARDIOLOGY | Facility: CLINIC | Age: 87
End: 2022-07-22
Payer: MEDICARE

## 2022-07-22 VITALS
WEIGHT: 200.81 LBS | RESPIRATION RATE: 16 BRPM | HEART RATE: 91 BPM | OXYGEN SATURATION: 98 % | SYSTOLIC BLOOD PRESSURE: 132 MMHG | HEIGHT: 74 IN | BODY MASS INDEX: 25.77 KG/M2 | DIASTOLIC BLOOD PRESSURE: 82 MMHG

## 2022-07-22 DIAGNOSIS — I35.8 AORTIC VALVE SCLEROSIS: ICD-10-CM

## 2022-07-22 DIAGNOSIS — I35.0 AORTIC VALVE STENOSIS, ETIOLOGY OF CARDIAC VALVE DISEASE UNSPECIFIED: ICD-10-CM

## 2022-07-22 DIAGNOSIS — I49.3 PVC (PREMATURE VENTRICULAR CONTRACTION): ICD-10-CM

## 2022-07-22 DIAGNOSIS — R01.1 HEART MURMUR: ICD-10-CM

## 2022-07-22 DIAGNOSIS — I10 ESSENTIAL HYPERTENSION: Primary | Chronic | ICD-10-CM

## 2022-07-22 DIAGNOSIS — I49.1 PREMATURE ATRIAL CONTRACTIONS: ICD-10-CM

## 2022-07-22 DIAGNOSIS — E78.00 HYPERCHOLESTEREMIA: Chronic | ICD-10-CM

## 2022-07-22 DIAGNOSIS — I63.012 CEREBROVASCULAR ACCIDENT (CVA) DUE TO THROMBOSIS OF LEFT VERTEBRAL ARTERY: ICD-10-CM

## 2022-07-22 PROCEDURE — 99999 PR PBB SHADOW E&M-EST. PATIENT-LVL IV: ICD-10-PCS | Mod: PBBFAC,,, | Performed by: INTERNAL MEDICINE

## 2022-07-22 PROCEDURE — 99499 UNLISTED E&M SERVICE: CPT | Mod: S$GLB,,, | Performed by: INTERNAL MEDICINE

## 2022-07-22 PROCEDURE — 1160F PR REVIEW ALL MEDS BY PRESCRIBER/CLIN PHARMACIST DOCUMENTED: ICD-10-PCS | Mod: CPTII,S$GLB,, | Performed by: INTERNAL MEDICINE

## 2022-07-22 PROCEDURE — 99204 OFFICE O/P NEW MOD 45 MIN: CPT | Mod: S$GLB,,, | Performed by: INTERNAL MEDICINE

## 2022-07-22 PROCEDURE — 1126F AMNT PAIN NOTED NONE PRSNT: CPT | Mod: CPTII,S$GLB,, | Performed by: INTERNAL MEDICINE

## 2022-07-22 PROCEDURE — 1159F MED LIST DOCD IN RCRD: CPT | Mod: CPTII,S$GLB,, | Performed by: INTERNAL MEDICINE

## 2022-07-22 PROCEDURE — 1160F RVW MEDS BY RX/DR IN RCRD: CPT | Mod: CPTII,S$GLB,, | Performed by: INTERNAL MEDICINE

## 2022-07-22 PROCEDURE — 1101F PR PT FALLS ASSESS DOC 0-1 FALLS W/OUT INJ PAST YR: ICD-10-PCS | Mod: CPTII,S$GLB,, | Performed by: INTERNAL MEDICINE

## 2022-07-22 PROCEDURE — 1101F PT FALLS ASSESS-DOCD LE1/YR: CPT | Mod: CPTII,S$GLB,, | Performed by: INTERNAL MEDICINE

## 2022-07-22 PROCEDURE — 1126F PR PAIN SEVERITY QUANTIFIED, NO PAIN PRESENT: ICD-10-PCS | Mod: CPTII,S$GLB,, | Performed by: INTERNAL MEDICINE

## 2022-07-22 PROCEDURE — 3288F FALL RISK ASSESSMENT DOCD: CPT | Mod: CPTII,S$GLB,, | Performed by: INTERNAL MEDICINE

## 2022-07-22 PROCEDURE — 99204 PR OFFICE/OUTPT VISIT, NEW, LEVL IV, 45-59 MIN: ICD-10-PCS | Mod: S$GLB,,, | Performed by: INTERNAL MEDICINE

## 2022-07-22 PROCEDURE — 99999 PR PBB SHADOW E&M-EST. PATIENT-LVL IV: CPT | Mod: PBBFAC,,, | Performed by: INTERNAL MEDICINE

## 2022-07-22 PROCEDURE — 1159F PR MEDICATION LIST DOCUMENTED IN MEDICAL RECORD: ICD-10-PCS | Mod: CPTII,S$GLB,, | Performed by: INTERNAL MEDICINE

## 2022-07-22 PROCEDURE — 99499 RISK ADDL DX/OHS AUDIT: ICD-10-PCS | Mod: S$GLB,,, | Performed by: INTERNAL MEDICINE

## 2022-07-22 PROCEDURE — 3288F PR FALLS RISK ASSESSMENT DOCUMENTED: ICD-10-PCS | Mod: CPTII,S$GLB,, | Performed by: INTERNAL MEDICINE

## 2022-07-22 NOTE — PROGRESS NOTES
Subjective:   Patient ID:  Chinmay Mei is a 88 y.o. male who presents for evaluation of No chief complaint on file.      87 yo male, care establish  PMH HTn HLD h/o CVA in . Some feet weakness. Lives alone and exwife came regularly.  No chest pain dyspnea dizziness leg swelling   Chronic heart murmur,  Echo in  showed normal biv function  EKG in  NSR and PVCs HR 64 bpm  Neck CTA in  showed a short segment occlusion involving the distal left vertebral artery with some calcified plaque present suggesting underlying stenosis  LDL 94 and BP controlled       Past Medical History:   Diagnosis Date    Acute ischemic stroke 2020    Arthritis     Hyperlipidemia     Hypertension     Open angle with borderline findings, low risk, bilateral 8/3/2018       Past Surgical History:   Procedure Laterality Date    CATARACT EXTRACTION W/  INTRAOCULAR LENS IMPLANT Left 2017    CATARACT EXTRACTION W/  INTRAOCULAR LENS IMPLANT Right 08/15/2018       Social History     Tobacco Use    Smoking status: Former Smoker     Quit date: 2006     Years since quittin.0    Smokeless tobacco: Never Used   Substance Use Topics    Alcohol use: No    Drug use: No       Family History   Problem Relation Age of Onset    Heart disease Mother     Hypertension Mother     Prostate cancer Father     Diabetes Father     Diabetes Sister     Heart disease Sister     Glaucoma Sister     Hypertension Sister     Ulcers Sister     Heart disease Brother     Diabetes Brother     Hypertension Brother     Kidney disease Daughter         dialysis    Stomach cancer Daughter     Diabetes Son     Hypertension Son     Stroke Brother        Review of Systems   Constitutional: Negative for decreased appetite, diaphoresis, fever, malaise/fatigue and night sweats.   HENT: Negative for nosebleeds.    Eyes: Negative for blurred vision and double vision.   Cardiovascular: Negative for chest pain, claudication,  dyspnea on exertion, irregular heartbeat, leg swelling, near-syncope, orthopnea, palpitations, paroxysmal nocturnal dyspnea and syncope.   Respiratory: Negative for cough, shortness of breath, sleep disturbances due to breathing, snoring, sputum production and wheezing.    Endocrine: Negative for cold intolerance and polyuria.   Hematologic/Lymphatic: Does not bruise/bleed easily.   Skin: Negative for rash.   Musculoskeletal: Negative for back pain, falls, joint pain, joint swelling and neck pain.   Gastrointestinal: Negative for abdominal pain, heartburn, nausea and vomiting.   Genitourinary: Negative for dysuria, frequency and hematuria.   Neurological: Positive for loss of balance. Negative for difficulty with concentration, dizziness, focal weakness, headaches, light-headedness, numbness, seizures and weakness.   Psychiatric/Behavioral: Negative for depression, memory loss and substance abuse. The patient does not have insomnia.    Allergic/Immunologic: Negative for HIV exposure and hives.       Objective:   Physical Exam  HENT:      Head: Normocephalic.   Eyes:      Pupils: Pupils are equal, round, and reactive to light.   Neck:      Thyroid: No thyromegaly.      Vascular: Normal carotid pulses. No carotid bruit or JVD.   Cardiovascular:      Rate and Rhythm: Normal rate and regular rhythm.  No extrasystoles are present.     Chest Wall: PMI is not displaced.      Pulses: Normal pulses.           Carotid pulses are 2+ on the right side and 2+ on the left side.     Heart sounds: Murmur heard.    Harsh midsystolic murmur is present with a grade of 4/6 at the upper right sternal border radiating to the neck.    No gallop. No S3 sounds.   Pulmonary:      Effort: No respiratory distress.      Breath sounds: Normal breath sounds. No stridor.   Abdominal:      General: Bowel sounds are normal.      Palpations: Abdomen is soft.      Tenderness: There is no abdominal tenderness. There is no rebound.   Musculoskeletal:          General: Normal range of motion.   Skin:     Findings: No rash.   Neurological:      Mental Status: He is alert and oriented to person, place, and time.   Psychiatric:         Behavior: Behavior normal.         Lab Results   Component Value Date    CHOL 152 11/04/2021    CHOL 238 (H) 07/27/2020    CHOL 198 03/25/2019     Lab Results   Component Value Date    HDL 39 (L) 11/04/2021    HDL 44 07/27/2020    HDL 32 (L) 03/25/2019     Lab Results   Component Value Date    LDLCALC 94.2 11/04/2021    LDLCALC 165.8 (H) 07/27/2020    LDLCALC 128.0 03/25/2019     Lab Results   Component Value Date    TRIG 94 11/04/2021    TRIG 141 07/27/2020    TRIG 190 (H) 03/25/2019     Lab Results   Component Value Date    CHOLHDL 25.7 11/04/2021    CHOLHDL 18.5 (L) 07/27/2020    CHOLHDL 16.2 (L) 03/25/2019       Chemistry        Component Value Date/Time     11/04/2021 1552    K 4.3 11/04/2021 1552    CL 99 11/04/2021 1552    CO2 29 11/04/2021 1552    BUN 13 11/04/2021 1552    CREATININE 0.8 11/04/2021 1552     11/04/2021 1552        Component Value Date/Time    CALCIUM 10.0 11/04/2021 1552    ALKPHOS 299 (H) 11/04/2021 1552    AST 61 (H) 11/04/2021 1552    ALT 96 (H) 11/04/2021 1552    BILITOT 0.7 11/04/2021 1552    ESTGFRAFRICA >60.0 11/04/2021 1552    EGFRNONAA >60.0 11/04/2021 1552          Lab Results   Component Value Date    HGBA1C 5.5 07/27/2020     Lab Results   Component Value Date    TSH 0.454 07/27/2020     Lab Results   Component Value Date    INR 1.0 07/28/2020    INR 1.0 07/27/2020     Lab Results   Component Value Date    WBC 7.23 11/04/2021    HGB 13.1 (L) 11/04/2021    HCT 42.2 11/04/2021    MCV 89 11/04/2021     11/04/2021     BNP  @LABRCNTIP(BNP,BNPTRIAGEBLO)@  CrCl cannot be calculated (Patient's most recent lab result is older than the maximum 7 days allowed.).  No results found in the last 24 hours.  No results found in the last 24 hours.  No results found in the last 24 hours.    Assessment:       1. Essential hypertension    2. Premature atrial contractions    3. Aortic valve stenosis, etiology of cardiac valve disease unspecified    4. Hypercholesteremia    5. Heart murmur    6. Aortic valve sclerosis    7. Cerebrovascular accident (CVA) due to thrombosis of left vertebral artery    8. PVC (premature ventricular contraction)        Plan:   Continue ASA statin amlodipine losartan and Hydralazine  Echo for murmur  DM Rx per PCP  Check Lipid profile in 6 months  Recommend heart-healthy diet, weight control  Christi. Risk modification.   I have reviewed all pertinent labs and cardiac studies independently. Plans and recommendations have been formulated under my direct supervision. All questions answered and patient voiced understanding.   If symptoms persist go to the ED  RTC in 6 months

## 2022-07-25 ENCOUNTER — OFFICE VISIT (OUTPATIENT)
Dept: PODIATRY | Facility: CLINIC | Age: 87
End: 2022-07-25
Payer: MEDICARE

## 2022-07-25 VITALS — BODY MASS INDEX: 25.77 KG/M2 | WEIGHT: 200.81 LBS | HEIGHT: 74 IN

## 2022-07-25 DIAGNOSIS — L60.3 ONYCHODYSTROPHY: Primary | ICD-10-CM

## 2022-07-25 PROCEDURE — 99203 OFFICE O/P NEW LOW 30 MIN: CPT | Mod: S$GLB,,, | Performed by: PODIATRIST

## 2022-07-25 PROCEDURE — 1125F AMNT PAIN NOTED PAIN PRSNT: CPT | Mod: CPTII,S$GLB,, | Performed by: PODIATRIST

## 2022-07-25 PROCEDURE — 1159F MED LIST DOCD IN RCRD: CPT | Mod: CPTII,S$GLB,, | Performed by: PODIATRIST

## 2022-07-25 PROCEDURE — 3288F PR FALLS RISK ASSESSMENT DOCUMENTED: ICD-10-PCS | Mod: CPTII,S$GLB,, | Performed by: PODIATRIST

## 2022-07-25 PROCEDURE — 1125F PR PAIN SEVERITY QUANTIFIED, PAIN PRESENT: ICD-10-PCS | Mod: CPTII,S$GLB,, | Performed by: PODIATRIST

## 2022-07-25 PROCEDURE — 99999 PR PBB SHADOW E&M-EST. PATIENT-LVL III: CPT | Mod: PBBFAC,,, | Performed by: PODIATRIST

## 2022-07-25 PROCEDURE — 3288F FALL RISK ASSESSMENT DOCD: CPT | Mod: CPTII,S$GLB,, | Performed by: PODIATRIST

## 2022-07-25 PROCEDURE — 1101F PT FALLS ASSESS-DOCD LE1/YR: CPT | Mod: CPTII,S$GLB,, | Performed by: PODIATRIST

## 2022-07-25 PROCEDURE — 99203 PR OFFICE/OUTPT VISIT, NEW, LEVL III, 30-44 MIN: ICD-10-PCS | Mod: S$GLB,,, | Performed by: PODIATRIST

## 2022-07-25 PROCEDURE — 1101F PR PT FALLS ASSESS DOC 0-1 FALLS W/OUT INJ PAST YR: ICD-10-PCS | Mod: CPTII,S$GLB,, | Performed by: PODIATRIST

## 2022-07-25 PROCEDURE — 1160F PR REVIEW ALL MEDS BY PRESCRIBER/CLIN PHARMACIST DOCUMENTED: ICD-10-PCS | Mod: CPTII,S$GLB,, | Performed by: PODIATRIST

## 2022-07-25 PROCEDURE — 1160F RVW MEDS BY RX/DR IN RCRD: CPT | Mod: CPTII,S$GLB,, | Performed by: PODIATRIST

## 2022-07-25 PROCEDURE — 1159F PR MEDICATION LIST DOCUMENTED IN MEDICAL RECORD: ICD-10-PCS | Mod: CPTII,S$GLB,, | Performed by: PODIATRIST

## 2022-07-25 PROCEDURE — 99999 PR PBB SHADOW E&M-EST. PATIENT-LVL III: ICD-10-PCS | Mod: PBBFAC,,, | Performed by: PODIATRIST

## 2022-07-25 NOTE — PROGRESS NOTES
Subjective:       Patient ID: Chinmay Mei is a 88 y.o. male.    Chief Complaint: Nail Care (Coming in for nail care, rates pain 10/10, nondiabetic, wearing socks and shoes, last seen PCP Dr. Leach on 2021.)      HPI: Chinmay Mei presents to the office with complaints of pains to the Right and left toenails due to dystrophic and thickened nail.  Patient reports increase in pain secondary to rubbing against the toe box of the shoes and causing difficulty with normal ambulation.   Reports no signs of cellulitis, ingrowing, or drainage..  Rates pain as a 10/10. Symptoms have been on going for several days and are worsening. States difficulties with walking as a result of pains. Walking and standing, particularly with shoe gear, exacerbates the ailment.Patient's Primary Care Provider is Luis E Leach MD.     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       Family History   Problem Relation Age of Onset    Heart disease Mother     Hypertension Mother     Prostate cancer Father     Diabetes Father     Diabetes Sister     Heart disease Sister     Glaucoma Sister     Hypertension Sister     Ulcers Sister     Heart disease Brother     Diabetes Brother     Hypertension Brother     Kidney disease Daughter         dialysis    Stomach cancer Daughter     Diabetes Son     Hypertension Son     Stroke Brother        Social History     Socioeconomic History    Marital status:    Tobacco Use    Smoking status: Former Smoker     Quit date: 2006     Years since quittin.0    Smokeless tobacco: Never Used   Substance and Sexual Activity    Alcohol use: No    Drug use: No     Social Determinants of Health     Financial Resource  "Strain: Low Risk     Difficulty of Paying Living Expenses: Not very hard   Food Insecurity: Unknown    Worried About Running Out of Food in the Last Year: Patient refused    Ran Out of Food in the Last Year: Patient refused   Transportation Needs: No Transportation Needs    Lack of Transportation (Medical): No    Lack of Transportation (Non-Medical): No   Physical Activity: Inactive    Days of Exercise per Week: 0 days    Minutes of Exercise per Session: 0 min   Stress: No Stress Concern Present    Feeling of Stress : Only a little   Social Connections: Moderately Isolated    Frequency of Communication with Friends and Family: More than three times a week    Frequency of Social Gatherings with Friends and Family: More than three times a week    Attends Mor[employer] Services: 1 to 4 times per year    Active Member of Clubs or Organizations: No    Attends Club or Organization Meetings: Never    Marital Status:    Housing Stability: Low Risk     Unable to Pay for Housing in the Last Year: No    Number of Places Lived in the Last Year: 1    Unstable Housing in the Last Year: No       Past Surgical History:   Procedure Laterality Date    CATARACT EXTRACTION W/  INTRAOCULAR LENS IMPLANT Left 02/22/2017    CATARACT EXTRACTION W/  INTRAOCULAR LENS IMPLANT Right 08/15/2018       Review of Systems      Objective:   Ht 6' 2" (1.88 m)   Wt 91.1 kg (200 lb 13.4 oz)   BMI 25.79 kg/m²     Physical Exam  LOWER EXTREMITY PHYSICAL EXAMINATION    NEUROLOGY: Sensation to light touch is intact. Proprioception is intact.  Vibratory sensation intact    VASCULAR:  The right dorsalis pedis pulse 2/4 and the right posterior tibial pulse 2/4.  The left dorsalis pedis pulse 2/4 and posterior tibial pulse on the left is 2/4.  Capillary refill is intact.  Pedal hair growth intact    DERMATOLOGY:  Skin is supple, moist, intact.  There is no signs of callusing, ulcerations, other lesions identified to the dorsal or plantar " aspect of the right or left foot.  The R1, 2, 5 and left L1,2, 5 are thickened, discolored dystrophic.  There is subungual debris.  Nail plates have area of dark discoloration.  The remaining nails 3-4 on the right foot and the left foot are elongated but of normal color, thickness, and texture.   There is no signs of ingrowing into the medial or lateral borders.  There is no evidence of wounds or skin breakdown.  No edema or erythema.  No obvious lacerations or fissuring.  Interdigital spaces are clean, dry, intact.  No rashes or scars appreciated.    ORTHOPEDIC: Manual Muscle Testing is 5/5 in all planes on the Right left, without pains, with and without resistance. Gait pattern is slightly antalgic.    Assessment:     1. Onychodystrophy        Plan:     Onychodystrophy      Thorough discussion is had with the patient today, concerning the diagnosis, its etiology, and the treatment algorithm at present.    Discussed treatment options regarding to the nail plate right and left.  Discussed conservative options regarding topicals, oral Lamisil, temporary versus permanent avulsion in hopes that a new nail plate would grow in normally.  Discussed the risks and benefits of all of these medications.  Discussed the efficacy as relates to the patient's medical history.    Dystrophic nail plates, as outlined above (R#1,2,5  ; L#1,2,5 ), are sharply debrided with double action nail nipper, and/or with the assistance of a mechanical rotary david, with removal of all offending nail and nail border(s), for reduction of pains. Nails are reduced in terms of length, width and girth with removal of subungual debris to facilitate pain free weight bearing and ambulation. The elongated nails as outlined in the objective portion of this note, were trimmed to appropriate length, with a double action nail nipper, for alleviation/reduction of pains as well.        Future Appointments   Date Time Provider Department Center   8/2/2022  1:00  PM [facility], [facility] SPE[facility] [place]   11/8/2022 10:00 AM Luis E Leach MD [facility]   1/26/2023  1:00 PM Alexey Hollingsworth MD [facility] CARDIO [facility]

## 2022-07-29 ENCOUNTER — PATIENT OUTREACH (OUTPATIENT)
Dept: ADMINISTRATIVE | Facility: OTHER | Age: 87
End: 2022-07-29
Payer: MEDICARE

## 2022-08-02 ENCOUNTER — HOSPITAL ENCOUNTER (OUTPATIENT)
Dept: CARDIOLOGY | Facility: HOSPITAL | Age: 87
Discharge: HOME | End: 2022-08-02
Attending: INTERNAL MEDICINE
Payer: MEDICARE

## 2022-08-02 VITALS
BODY MASS INDEX: 25.67 KG/M2 | DIASTOLIC BLOOD PRESSURE: 82 MMHG | WEIGHT: 200 LBS | HEIGHT: 74 IN | SYSTOLIC BLOOD PRESSURE: 132 MMHG

## 2022-08-02 DIAGNOSIS — R01.1 HEART MURMUR: ICD-10-CM

## 2022-08-02 DIAGNOSIS — I35.0 AORTIC VALVE STENOSIS, ETIOLOGY OF CARDIAC VALVE DISEASE UNSPECIFIED: ICD-10-CM

## 2022-08-02 LAB
AORTIC ROOT ANNULUS: 2.78 CM
ASCENDING AORTA: 3.45 CM
AV INDEX (PROSTH): 0.23
AV MEAN GRADIENT: 21 MMHG
AV PEAK GRADIENT: 34 MMHG
AV VALVE AREA: 0.84 CM2
AV VELOCITY RATIO: 0.28
BSA FOR ECHO PROCEDURE: 2.18 M2
CV ECHO LV RWT: 0.59 CM
DOP CALC AO PEAK VEL: 2.92 M/S
DOP CALC AO VTI: 78.8 CM
DOP CALC LVOT AREA: 3.6 CM2
DOP CALC LVOT DIAMETER: 2.14 CM
DOP CALC LVOT PEAK VEL: 0.83 M/S
DOP CALC LVOT STROKE VOLUME: 66.51 CM3
DOP CALC RVOT PEAK VEL: 0.96 M/S
DOP CALC RVOT VTI: 19.2 CM
DOP CALCLVOT PEAK VEL VTI: 18.5 CM
E WAVE DECELERATION TIME: 295.7 MSEC
E/A RATIO: 0.95
E/E' RATIO: 13.87 M/S
ECHO LV POSTERIOR WALL: 1.18 CM (ref 0.6–1.1)
EJECTION FRACTION: 60 %
FRACTIONAL SHORTENING: 32 % (ref 28–44)
INTERVENTRICULAR SEPTUM: 1.14 CM (ref 0.6–1.1)
IVC DIAMETER: 2.06 CM
IVRT: 82.78 MSEC
LA MAJOR: 5.25 CM
LA MINOR: 5.67 CM
LA WIDTH: 4 CM
LEFT ATRIUM SIZE: 3.42 CM
LEFT ATRIUM VOLUME INDEX MOD: 29.2 ML/M2
LEFT ATRIUM VOLUME INDEX: 29.2 ML/M2
LEFT ATRIUM VOLUME MOD: 63.29 CM3
LEFT ATRIUM VOLUME: 63.39 CM3
LEFT INTERNAL DIMENSION IN SYSTOLE: 2.74 CM (ref 2.1–4)
LEFT VENTRICLE DIASTOLIC VOLUME INDEX: 32.75 ML/M2
LEFT VENTRICLE DIASTOLIC VOLUME: 71.06 ML
LEFT VENTRICLE MASS INDEX: 73 G/M2
LEFT VENTRICLE SYSTOLIC VOLUME INDEX: 12.9 ML/M2
LEFT VENTRICLE SYSTOLIC VOLUME: 28.08 ML
LEFT VENTRICULAR INTERNAL DIMENSION IN DIASTOLE: 4.03 CM (ref 3.5–6)
LEFT VENTRICULAR MASS: 159.18 G
LV LATERAL E/E' RATIO: 13 M/S
LV SEPTAL E/E' RATIO: 14.86 M/S
LVOT MG: 1.36 MMHG
LVOT MV: 0.53 CM/S
MV PEAK A VEL: 1.1 M/S
MV PEAK E VEL: 1.04 M/S
MV STENOSIS PRESSURE HALF TIME: 85.75 MS
MV VALVE AREA P 1/2 METHOD: 2.57 CM2
PISA TR MAX VEL: 2.52 M/S
PV MEAN GRADIENT: 1.81 MMHG
PV MV: 0.78 M/S
PV PEAK VELOCITY: 1.22 CM/S
RA MAJOR: 5.18 CM
RA PRESSURE: 3 MMHG
RA WIDTH: 2.87 CM
RIGHT VENTRICULAR END-DIASTOLIC DIMENSION: 3.42 CM
SINUS: 3.26 CM
STJ: 2.96 CM
TDI LATERAL: 0.08 M/S
TDI SEPTAL: 0.07 M/S
TDI: 0.08 M/S
TR MAX PG: 25 MMHG
TRICUSPID ANNULAR PLANE SYSTOLIC EXCURSION: 2.37 CM
TV REST PULMONARY ARTERY PRESSURE: 28 MMHG

## 2022-08-02 PROCEDURE — 93306 ECHO (CUPID ONLY): ICD-10-PCS | Mod: 26,,, | Performed by: INTERNAL MEDICINE

## 2022-08-02 PROCEDURE — 93306 TTE W/DOPPLER COMPLETE: CPT

## 2022-08-02 PROCEDURE — 93306 TTE W/DOPPLER COMPLETE: CPT | Mod: 26,,, | Performed by: INTERNAL MEDICINE

## 2022-08-04 ENCOUNTER — TELEPHONE (OUTPATIENT)
Dept: CARDIOLOGY | Facility: CLINIC | Age: 87
End: 2022-08-04
Payer: MEDICARE

## 2022-08-04 NOTE — TELEPHONE ENCOUNTER
Patient contacted and was advised that his-  Echo showed mod to severe AS  Continue current Rx  The patient stated understanding with no questions or concern

## 2022-11-08 ENCOUNTER — LAB VISIT (OUTPATIENT)
Dept: LAB | Facility: HOSPITAL | Age: 87
End: 2022-11-08
Attending: INTERNAL MEDICINE
Payer: MEDICARE

## 2022-11-08 ENCOUNTER — OFFICE VISIT (OUTPATIENT)
Dept: FAMILY MEDICINE | Facility: CLINIC | Age: 87
End: 2022-11-08
Payer: MEDICARE

## 2022-11-08 VITALS
SYSTOLIC BLOOD PRESSURE: 140 MMHG | DIASTOLIC BLOOD PRESSURE: 98 MMHG | TEMPERATURE: 97 F | BODY MASS INDEX: 27.08 KG/M2 | OXYGEN SATURATION: 98 % | HEIGHT: 74 IN | HEART RATE: 80 BPM | WEIGHT: 211 LBS

## 2022-11-08 DIAGNOSIS — R97.20 ELEVATED PSA: ICD-10-CM

## 2022-11-08 DIAGNOSIS — G31.84 MILD COGNITIVE IMPAIRMENT WITH MEMORY LOSS: ICD-10-CM

## 2022-11-08 DIAGNOSIS — I10 ESSENTIAL HYPERTENSION: Chronic | ICD-10-CM

## 2022-11-08 DIAGNOSIS — R01.1 HEART MURMUR: ICD-10-CM

## 2022-11-08 DIAGNOSIS — E78.00 HYPERCHOLESTEREMIA: Chronic | ICD-10-CM

## 2022-11-08 DIAGNOSIS — I35.8 AORTIC VALVE SCLEROSIS: ICD-10-CM

## 2022-11-08 DIAGNOSIS — N40.0 BENIGN PROSTATIC HYPERPLASIA, UNSPECIFIED WHETHER LOWER URINARY TRACT SYMPTOMS PRESENT: ICD-10-CM

## 2022-11-08 DIAGNOSIS — I63.012 CEREBROVASCULAR ACCIDENT (CVA) DUE TO THROMBOSIS OF LEFT VERTEBRAL ARTERY: ICD-10-CM

## 2022-11-08 DIAGNOSIS — E78.00 HYPERCHOLESTEREMIA: Primary | Chronic | ICD-10-CM

## 2022-11-08 LAB
BASOPHILS # BLD AUTO: 0.03 K/UL (ref 0–0.2)
BASOPHILS NFR BLD: 0.5 % (ref 0–1.9)
DIFFERENTIAL METHOD: ABNORMAL
EOSINOPHIL # BLD AUTO: 0.1 K/UL (ref 0–0.5)
EOSINOPHIL NFR BLD: 1.8 % (ref 0–8)
ERYTHROCYTE [DISTWIDTH] IN BLOOD BY AUTOMATED COUNT: 14.8 % (ref 11.5–14.5)
HCT VFR BLD AUTO: 44.6 % (ref 40–54)
HGB BLD-MCNC: 13.8 G/DL (ref 14–18)
IMM GRANULOCYTES # BLD AUTO: 0.02 K/UL (ref 0–0.04)
IMM GRANULOCYTES NFR BLD AUTO: 0.3 % (ref 0–0.5)
LYMPHOCYTES # BLD AUTO: 3 K/UL (ref 1–4.8)
LYMPHOCYTES NFR BLD: 48.1 % (ref 18–48)
MCH RBC QN AUTO: 27.4 PG (ref 27–31)
MCHC RBC AUTO-ENTMCNC: 30.9 G/DL (ref 32–36)
MCV RBC AUTO: 89 FL (ref 82–98)
MONOCYTES # BLD AUTO: 0.6 K/UL (ref 0.3–1)
MONOCYTES NFR BLD: 8.9 % (ref 4–15)
NEUTROPHILS # BLD AUTO: 2.5 K/UL (ref 1.8–7.7)
NEUTROPHILS NFR BLD: 40.4 % (ref 38–73)
NRBC BLD-RTO: 0 /100 WBC
PLATELET # BLD AUTO: 274 K/UL (ref 150–450)
PMV BLD AUTO: 11.3 FL (ref 9.2–12.9)
RBC # BLD AUTO: 5.04 M/UL (ref 4.6–6.2)
WBC # BLD AUTO: 6.26 K/UL (ref 3.9–12.7)

## 2022-11-08 PROCEDURE — 3288F FALL RISK ASSESSMENT DOCD: CPT | Mod: CPTII,S$GLB,, | Performed by: INTERNAL MEDICINE

## 2022-11-08 PROCEDURE — 99214 OFFICE O/P EST MOD 30 MIN: CPT | Mod: S$GLB,,, | Performed by: INTERNAL MEDICINE

## 2022-11-08 PROCEDURE — 1126F AMNT PAIN NOTED NONE PRSNT: CPT | Mod: CPTII,S$GLB,, | Performed by: INTERNAL MEDICINE

## 2022-11-08 PROCEDURE — 99999 PR PBB SHADOW E&M-EST. PATIENT-LVL IV: CPT | Mod: PBBFAC,,, | Performed by: INTERNAL MEDICINE

## 2022-11-08 PROCEDURE — 80053 COMPREHEN METABOLIC PANEL: CPT | Performed by: INTERNAL MEDICINE

## 2022-11-08 PROCEDURE — 1126F PR PAIN SEVERITY QUANTIFIED, NO PAIN PRESENT: ICD-10-PCS | Mod: CPTII,S$GLB,, | Performed by: INTERNAL MEDICINE

## 2022-11-08 PROCEDURE — 36415 COLL VENOUS BLD VENIPUNCTURE: CPT | Mod: PO | Performed by: INTERNAL MEDICINE

## 2022-11-08 PROCEDURE — 1101F PR PT FALLS ASSESS DOC 0-1 FALLS W/OUT INJ PAST YR: ICD-10-PCS | Mod: CPTII,S$GLB,, | Performed by: INTERNAL MEDICINE

## 2022-11-08 PROCEDURE — 1159F PR MEDICATION LIST DOCUMENTED IN MEDICAL RECORD: ICD-10-PCS | Mod: CPTII,S$GLB,, | Performed by: INTERNAL MEDICINE

## 2022-11-08 PROCEDURE — 85025 COMPLETE CBC W/AUTO DIFF WBC: CPT | Performed by: INTERNAL MEDICINE

## 2022-11-08 PROCEDURE — 1159F MED LIST DOCD IN RCRD: CPT | Mod: CPTII,S$GLB,, | Performed by: INTERNAL MEDICINE

## 2022-11-08 PROCEDURE — 3288F PR FALLS RISK ASSESSMENT DOCUMENTED: ICD-10-PCS | Mod: CPTII,S$GLB,, | Performed by: INTERNAL MEDICINE

## 2022-11-08 PROCEDURE — 1101F PT FALLS ASSESS-DOCD LE1/YR: CPT | Mod: CPTII,S$GLB,, | Performed by: INTERNAL MEDICINE

## 2022-11-08 PROCEDURE — 80061 LIPID PANEL: CPT | Performed by: INTERNAL MEDICINE

## 2022-11-08 PROCEDURE — 99214 PR OFFICE/OUTPT VISIT, EST, LEVL IV, 30-39 MIN: ICD-10-PCS | Mod: S$GLB,,, | Performed by: INTERNAL MEDICINE

## 2022-11-08 PROCEDURE — 99999 PR PBB SHADOW E&M-EST. PATIENT-LVL IV: ICD-10-PCS | Mod: PBBFAC,,, | Performed by: INTERNAL MEDICINE

## 2022-11-08 RX ORDER — AMLODIPINE BESYLATE 5 MG/1
5 TABLET ORAL 2 TIMES DAILY
Qty: 180 TABLET | Refills: 3 | Status: SHIPPED | OUTPATIENT
Start: 2022-11-08 | End: 2023-08-30

## 2022-11-08 NOTE — PROGRESS NOTES
Subjective:       Patient ID: Chinmay Mei is a 88 y.o. male.    Chief Complaint: Annual Exam, Hypertension, Hyperlipidemia, and Benign Prostatic Hypertrophy    Hypertension  Pertinent negatives include no chest pain, headaches, neck pain, palpitations or shortness of breath.   Hyperlipidemia  Pertinent negatives include no chest pain, myalgias or shortness of breath.   Benign Prostatic Hypertrophy  Irritative symptoms include frequency. Irritative symptoms do not include urgency. Pertinent negatives include no chills, dysuria, hematuria, nausea or vomiting.   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     Past Surgical History:   Procedure Laterality Date    CATARACT EXTRACTION W/  INTRAOCULAR LENS IMPLANT Left 2017    CATARACT EXTRACTION W/  INTRAOCULAR LENS IMPLANT Right 08/15/2018     Family History   Problem Relation Age of Onset    Heart disease Mother     Hypertension Mother     Prostate cancer Father     Diabetes Father     Diabetes Sister     Heart disease Sister     Glaucoma Sister     Hypertension Sister     Ulcers Sister     Heart disease Brother     Diabetes Brother     Hypertension Brother     Kidney disease Daughter         dialysis    Stomach cancer Daughter     Diabetes Son     Hypertension Son     Stroke Brother      Social History     Socioeconomic History    Marital status:    Tobacco Use    Smoking status: Former     Types: Cigarettes     Quit date: 2006     Years since quittin.3    Smokeless tobacco: Never   Substance and Sexual Activity    Alcohol use: No    Drug use: No     Social Determinants of Health     Financial Resource Strain: Low Risk     Difficulty of Paying Living Expenses: Not very hard   Food Insecurity: No Food Insecurity    Worried About Running Out of Food in the Last Year: Never true    Ran Out of Food in  the Last Year: Never true   Transportation Needs: No Transportation Needs    Lack of Transportation (Medical): No    Lack of Transportation (Non-Medical): No   Physical Activity: Inactive    Days of Exercise per Week: 0 days    Minutes of Exercise per Session: 0 min   Stress: No Stress Concern Present    Feeling of Stress : Only a little   Social Connections: Moderately Isolated    Frequency of Communication with Friends and Family: More than three times a week    Frequency of Social Gatherings with Friends and Family: More than three times a week    Attends Hoahaoism Services: 1 to 4 times per year    Active Member of Clubs or Organizations: No    Attends Club or Organization Meetings: Never    Marital Status:    Housing Stability: Low Risk     Unable to Pay for Housing in the Last Year: No    Number of Places Lived in the Last Year: 1    Unstable Housing in the Last Year: No     Review of Systems   Constitutional:  Negative for activity change, appetite change, chills, diaphoresis, fatigue, fever and unexpected weight change.   HENT:  Negative for drooling, ear discharge, ear pain, facial swelling, hearing loss, mouth sores, nosebleeds, postnasal drip, rhinorrhea, sinus pressure, sneezing, sore throat, tinnitus, trouble swallowing and voice change.    Eyes:  Negative for photophobia, redness and visual disturbance.   Respiratory:  Negative for apnea, cough, choking, chest tightness, shortness of breath and wheezing.    Cardiovascular:  Negative for chest pain and palpitations.   Gastrointestinal:  Negative for abdominal distention, abdominal pain, anal bleeding, blood in stool, constipation, diarrhea, nausea and vomiting.   Endocrine: Negative for cold intolerance, heat intolerance, polydipsia, polyphagia and polyuria.   Genitourinary:  Positive for frequency. Negative for difficulty urinating, dysuria, enuresis, flank pain, genital sores, hematuria and urgency.   Musculoskeletal:  Positive for arthralgias.  Negative for back pain, gait problem, joint swelling, myalgias, neck pain and neck stiffness.   Skin:  Negative for color change, pallor, rash and wound.   Allergic/Immunologic: Negative for food allergies and immunocompromised state.   Neurological:  Negative for dizziness, tremors, seizures, syncope, facial asymmetry, speech difficulty, weakness, light-headedness, numbness and headaches.   Hematological:  Negative for adenopathy. Does not bruise/bleed easily.   Psychiatric/Behavioral:  Negative for agitation, behavioral problems, confusion, decreased concentration, dysphoric mood, hallucinations, self-injury, sleep disturbance and suicidal ideas. The patient is not nervous/anxious and is not hyperactive.      Objective:      Physical Exam  Vitals and nursing note reviewed.   Constitutional:       General: He is not in acute distress.     Appearance: Normal appearance. He is well-developed. He is not diaphoretic.   HENT:      Head: Normocephalic and atraumatic.      Mouth/Throat:      Pharynx: No oropharyngeal exudate.   Eyes:      General: No scleral icterus.     Pupils: Pupils are equal, round, and reactive to light.   Neck:      Thyroid: No thyromegaly.      Vascular: No carotid bruit or JVD.      Trachea: No tracheal deviation.   Cardiovascular:      Rate and Rhythm: Normal rate and regular rhythm.      Heart sounds: Normal heart sounds.   Pulmonary:      Effort: Pulmonary effort is normal. No respiratory distress.      Breath sounds: Normal breath sounds. No wheezing or rales.   Chest:      Chest wall: No tenderness.   Abdominal:      General: Bowel sounds are normal. There is no distension.      Palpations: Abdomen is soft.      Tenderness: There is no abdominal tenderness. There is no guarding or rebound.   Musculoskeletal:         General: No tenderness. Normal range of motion.      Cervical back: Normal range of motion and neck supple.   Lymphadenopathy:      Cervical: No cervical adenopathy.   Skin:      General: Skin is warm and dry.      Coloration: Skin is not pale.      Findings: No erythema or rash.   Neurological:      Mental Status: He is alert and oriented to person, place, and time.   Psychiatric:         Behavior: Behavior normal.         Thought Content: Thought content normal.         Judgment: Judgment normal.       CMP  Sodium   Date Value Ref Range Status   11/04/2021 139 136 - 145 mmol/L Final     Potassium   Date Value Ref Range Status   11/04/2021 4.3 3.5 - 5.1 mmol/L Final     Chloride   Date Value Ref Range Status   11/04/2021 99 95 - 110 mmol/L Final     CO2   Date Value Ref Range Status   11/04/2021 29 23 - 29 mmol/L Final     Glucose   Date Value Ref Range Status   11/04/2021 100 70 - 110 mg/dL Final     BUN   Date Value Ref Range Status   11/04/2021 13 8 - 23 mg/dL Final     Creatinine   Date Value Ref Range Status   11/04/2021 0.8 0.5 - 1.4 mg/dL Final     Calcium   Date Value Ref Range Status   11/04/2021 10.0 8.7 - 10.5 mg/dL Final     Total Protein   Date Value Ref Range Status   11/04/2021 8.5 (H) 6.0 - 8.4 g/dL Final     Albumin   Date Value Ref Range Status   11/04/2021 4.2 3.5 - 5.2 g/dL Final     Total Bilirubin   Date Value Ref Range Status   11/04/2021 0.7 0.1 - 1.0 mg/dL Final     Comment:     For infants and newborns, interpretation of results should be based  on gestational age, weight and in agreement with clinical  observations.    Premature Infant recommended reference ranges:  Up to 24 hours.............<8.0 mg/dL  Up to 48 hours............<12.0 mg/dL  3-5 days..................<15.0 mg/dL  6-29 days.................<15.0 mg/dL       Alkaline Phosphatase   Date Value Ref Range Status   11/04/2021 299 (H) 55 - 135 U/L Final     AST   Date Value Ref Range Status   11/04/2021 61 (H) 10 - 40 U/L Final     ALT   Date Value Ref Range Status   11/04/2021 96 (H) 10 - 44 U/L Final     Anion Gap   Date Value Ref Range Status   11/04/2021 11 8 - 16 mmol/L Final     eGFR if African  American   Date Value Ref Range Status   11/04/2021 >60.0 >60 mL/min/1.73 m^2 Final     eGFR if non    Date Value Ref Range Status   11/04/2021 >60.0 >60 mL/min/1.73 m^2 Final     Comment:     Calculation used to obtain the estimated glomerular filtration  rate (eGFR) is the CKD-EPI equation.        Lab Results   Component Value Date    WBC 7.23 11/04/2021    HGB 13.1 (L) 11/04/2021    HCT 42.2 11/04/2021    MCV 89 11/04/2021     11/04/2021     Lab Results   Component Value Date    CHOL 152 11/04/2021     Lab Results   Component Value Date    HDL 39 (L) 11/04/2021     Lab Results   Component Value Date    LDLCALC 94.2 11/04/2021     Lab Results   Component Value Date    TRIG 94 11/04/2021     Lab Results   Component Value Date    CHOLHDL 25.7 11/04/2021     Lab Results   Component Value Date    TSH 0.454 07/27/2020     Lab Results   Component Value Date    HGBA1C 5.5 07/27/2020     Assessment:       1. Hypercholesteremia    2. Heart murmur    3. Elevated PSA    4. Essential hypertension    5. Cerebrovascular accident (CVA) due to thrombosis of left vertebral artery    6. Aortic valve sclerosis    7. Benign prostatic hyperplasia, unspecified whether lower urinary tract symptoms present    8. Mild cognitive impairment with memory loss          Plan:   Hypercholesteremia  -     Lipid Panel; Future; Expected date: 11/08/2022    Heart murmur    Elevated PSA    Essential hypertension----increase amlodipine to bid------follow-  -     Comprehensive Metabolic Panel; Future; Expected date: 11/08/2022  -     CBC Auto Differential; Future; Expected date: 11/08/2022    Cerebrovascular accident (CVA) due to thrombosis of left vertebral artery    Aortic valve sclerosis    Benign prostatic hyperplasia, unspecified whether lower urinary tract symptoms present  -     Ambulatory referral/consult to Urology; Future; Expected date: 11/15/2022    Mild cognitive impairment with memory loss    Other orders  -      amLODIPine (NORVASC) 5 MG tablet; Take 1 tablet (5 mg total) by mouth 2 (two) times daily.  Dispense: 180 tablet; Refill: 3      Stable------------continue meds-------------as above---------------f/u 3 months---------

## 2022-11-09 ENCOUNTER — TELEPHONE (OUTPATIENT)
Dept: FAMILY MEDICINE | Facility: CLINIC | Age: 87
End: 2022-11-09
Payer: MEDICARE

## 2022-11-09 LAB
ALBUMIN SERPL BCP-MCNC: 4.1 G/DL (ref 3.5–5.2)
ALP SERPL-CCNC: 85 U/L (ref 55–135)
ALT SERPL W/O P-5'-P-CCNC: 15 U/L (ref 10–44)
ANION GAP SERPL CALC-SCNC: 10 MMOL/L (ref 8–16)
AST SERPL-CCNC: 20 U/L (ref 10–40)
BILIRUB SERPL-MCNC: 1.1 MG/DL (ref 0.1–1)
BUN SERPL-MCNC: 14 MG/DL (ref 8–23)
CALCIUM SERPL-MCNC: 9.8 MG/DL (ref 8.7–10.5)
CHLORIDE SERPL-SCNC: 102 MMOL/L (ref 95–110)
CHOLEST SERPL-MCNC: 211 MG/DL (ref 120–199)
CHOLEST/HDLC SERPL: 5.3 {RATIO} (ref 2–5)
CO2 SERPL-SCNC: 26 MMOL/L (ref 23–29)
CREAT SERPL-MCNC: 1 MG/DL (ref 0.5–1.4)
EST. GFR  (NO RACE VARIABLE): >60 ML/MIN/1.73 M^2
GLUCOSE SERPL-MCNC: 76 MG/DL (ref 70–110)
HDLC SERPL-MCNC: 40 MG/DL (ref 40–75)
HDLC SERPL: 19 % (ref 20–50)
LDLC SERPL CALC-MCNC: 145.6 MG/DL (ref 63–159)
NONHDLC SERPL-MCNC: 171 MG/DL
POTASSIUM SERPL-SCNC: 4.1 MMOL/L (ref 3.5–5.1)
PROT SERPL-MCNC: 7.7 G/DL (ref 6–8.4)
SODIUM SERPL-SCNC: 138 MMOL/L (ref 136–145)
TRIGL SERPL-MCNC: 127 MG/DL (ref 30–150)

## 2023-01-23 RX ORDER — HYDRALAZINE HYDROCHLORIDE 50 MG/1
50 TABLET, FILM COATED ORAL EVERY 12 HOURS
Qty: 180 TABLET | Refills: 3 | Status: SHIPPED | OUTPATIENT
Start: 2023-01-23

## 2023-02-07 DIAGNOSIS — Z00.00 ENCOUNTER FOR MEDICARE ANNUAL WELLNESS EXAM: ICD-10-CM

## 2023-02-09 DIAGNOSIS — Z00.00 ENCOUNTER FOR MEDICARE ANNUAL WELLNESS EXAM: ICD-10-CM

## 2023-04-17 ENCOUNTER — TELEPHONE (OUTPATIENT)
Dept: ADMINISTRATIVE | Facility: HOSPITAL | Age: 88
End: 2023-04-17
Payer: MEDICARE

## 2023-06-18 ENCOUNTER — TELEPHONE (OUTPATIENT)
Dept: ADMINISTRATIVE | Facility: HOSPITAL | Age: 88
End: 2023-06-18
Payer: MEDICARE

## 2023-07-14 ENCOUNTER — PES CALL (OUTPATIENT)
Dept: ADMINISTRATIVE | Facility: CLINIC | Age: 88
End: 2023-07-14
Payer: MEDICARE

## 2023-08-30 RX ORDER — AMLODIPINE BESYLATE 5 MG/1
5 TABLET ORAL 2 TIMES DAILY
Qty: 180 TABLET | Refills: 0 | Status: SHIPPED | OUTPATIENT
Start: 2023-08-30 | End: 2024-01-26

## 2023-08-30 RX ORDER — LOSARTAN POTASSIUM 100 MG/1
TABLET ORAL
Qty: 90 TABLET | Refills: 0 | Status: SHIPPED | OUTPATIENT
Start: 2023-08-30 | End: 2024-01-26

## 2023-08-30 NOTE — TELEPHONE ENCOUNTER
Refill Routing Note   Medication(s) are not appropriate for processing by [facility] for the following reason(s):      Required vitals abnormal    [facility] action(s):  Defer Care Due:  Appointment due  Labs due            Appointments  past 12m or future 3m with PCP    Date Provider   Last Visit   11/8/2022 Luis E Leach MD   Next Visit   Visit date not found Luis E Leach MD   ED visits in past 90 days: 0        Note composed:1:04 PM 08/30/2023

## 2023-08-30 NOTE — TELEPHONE ENCOUNTER
Care Due:                  Date            Visit Type   Department     Provider  --------------------------------------------------------------------------------                                EP -                              PRIMARY      [facility]  Last Visit: 11-      CARE ([facility])   MEDICINE       Luis E Leach  Next Visit: None Scheduled  None         None Found                                                            Last  Test          Frequency    Reason                     Performed    Due Date  --------------------------------------------------------------------------------    Office Visit  12 months..  amLODIPine, losartan.....  11- 11-    CMP.........  12 months..  losartan.................  11- 11-    [facility] Embedded Care Due Messages. Reference number: [id number].   8/30/2023 11:37:18 AM CDT

## 2023-08-31 ENCOUNTER — OFFICE VISIT (OUTPATIENT)
Dept: OPHTHALMOLOGY | Facility: CLINIC | Age: 88
End: 2023-08-31
Payer: MEDICARE

## 2023-08-31 DIAGNOSIS — H10.33 ACUTE BACTERIAL CONJUNCTIVITIS OF BOTH EYES: Primary | ICD-10-CM

## 2023-08-31 DIAGNOSIS — H02.102 ECTROPION OF BOTH LOWER EYELIDS, UNSPECIFIED ECTROPION TYPE: ICD-10-CM

## 2023-08-31 DIAGNOSIS — H02.105 ECTROPION OF BOTH LOWER EYELIDS, UNSPECIFIED ECTROPION TYPE: ICD-10-CM

## 2023-08-31 PROCEDURE — 99999 PR PBB SHADOW E&M-EST. PATIENT-LVL II: CPT | Mod: PBBFAC,HCNC,, | Performed by: OPTOMETRIST

## 2023-08-31 PROCEDURE — 99203 OFFICE O/P NEW LOW 30 MIN: CPT | Mod: HCNC,S$GLB,, | Performed by: OPTOMETRIST

## 2023-08-31 PROCEDURE — 1160F PR REVIEW ALL MEDS BY PRESCRIBER/CLIN PHARMACIST DOCUMENTED: ICD-10-PCS | Mod: HCNC,CPTII,S$GLB, | Performed by: OPTOMETRIST

## 2023-08-31 PROCEDURE — 1159F MED LIST DOCD IN RCRD: CPT | Mod: HCNC,CPTII,S$GLB, | Performed by: OPTOMETRIST

## 2023-08-31 PROCEDURE — 99203 PR OFFICE/OUTPT VISIT, NEW, LEVL III, 30-44 MIN: ICD-10-PCS | Mod: HCNC,S$GLB,, | Performed by: OPTOMETRIST

## 2023-08-31 PROCEDURE — 99999 PR PBB SHADOW E&M-EST. PATIENT-LVL II: ICD-10-PCS | Mod: PBBFAC,HCNC,, | Performed by: OPTOMETRIST

## 2023-08-31 PROCEDURE — 1159F PR MEDICATION LIST DOCUMENTED IN MEDICAL RECORD: ICD-10-PCS | Mod: HCNC,CPTII,S$GLB, | Performed by: OPTOMETRIST

## 2023-08-31 PROCEDURE — 1160F RVW MEDS BY RX/DR IN RCRD: CPT | Mod: HCNC,CPTII,S$GLB, | Performed by: OPTOMETRIST

## 2023-08-31 RX ORDER — MOXIFLOXACIN 5 MG/ML
1 SOLUTION/ DROPS OPHTHALMIC 3 TIMES DAILY
Qty: 3 ML | Refills: 0 | Status: SHIPPED | OUTPATIENT
Start: 2023-08-31 | End: 2023-09-07

## 2023-08-31 NOTE — PROGRESS NOTES
HPI     Eye Pain            Comments: Pain Scale:  8  Onset:   off and on for a few months  OD, OS, OU:   OU, but OS is worse  Discharge:   yes  A.M. Matting:  yes  Itch:   yes  Redness:   yes  Photophobia:   not sure  Foreign body sensation:   yes  Deep pain:   no  Previous occurrence:   yes  Drops:   OTC drops          Last edited by Teresa Hernandez on 8/31/2023  9:17 AM.            Assessment /Plan     For exam results, see Encounter Report.    Acute bacterial conjunctivitis of both eyes  -     moxifloxacin (VIGAMOX) 0.5 % ophthalmic solution; Place 1 drop into both eyes 3 (three) times daily. for 7 days  Dispense: 3 mL; Refill: 0  Start Vigamox tid OU for 7 days     Ectropion of both lower eyelids, unspecified ectropion type  Discussed surgical consult with oculoplastics, declined today, pt to call if desired in the future    RTC PRN if symptoms worsen or not resolved x 1 week  Discussed above and all questions were answered.

## 2023-09-21 NOTE — TELEPHONE ENCOUNTER
LM advising of both arrival times for surgery (0700)/post op (0745).  Reminded to  drops and to begin day before surgery.   Opt out

## 2023-12-06 NOTE — PROGRESS NOTES
CHW - Case Closure    This Community Health Worker spoke to Lila Samayoa by telephone today.   Pt/Caregiver reported: Has received resources requested.  Pt/Caregiver denied any additional needs at this time and agrees with episode closure at this time.  Provided Lila Samayoa with Community Health Worker's contact information and encouraged him/her to contact this Community Health Worker if additional needs arise.         PTs daughter Esther returning a call back.   It is RN Holly BOYD that called PT, spoke with Holly BOYD letting her know PT is returning call.

## 2024-01-26 RX ORDER — AMLODIPINE BESYLATE 5 MG/1
5 TABLET ORAL 2 TIMES DAILY
Qty: 180 TABLET | Refills: 0 | Status: SHIPPED | OUTPATIENT
Start: 2024-01-26 | End: 2024-04-08

## 2024-01-26 RX ORDER — LOSARTAN POTASSIUM 100 MG/1
TABLET ORAL
Qty: 90 TABLET | Refills: 0 | Status: SHIPPED | OUTPATIENT
Start: 2024-01-26 | End: 2024-04-08

## 2024-01-26 NOTE — TELEPHONE ENCOUNTER
Care Due:                  Date            Visit Type   Department     Provider  --------------------------------------------------------------------------------                                EP -                              PRIMARY      [facility]  Last Visit: 11-      CARE ([facility])   MEDICINE       Luis E Leach  Next Visit: None Scheduled  None         None Found                                                            Last  Test          Frequency    Reason                     Performed    Due Date  --------------------------------------------------------------------------------    Office Visit  15 months..  amLODIPine, hydrALAZINE,   11- 02-                             losartan.................    CBC.........  12 months..  hydrALAZINE..............  11- 11-    CMP.........  12 months..  losartan.................  11- 11-    [facility] Embedded Care Due Messages. Reference number: [id number].   1/26/2024 2:01:33 AM CST

## 2024-01-26 NOTE — TELEPHONE ENCOUNTER
Refill Routing Note   Medication(s) are not appropriate for processing by [facility] for the following reason(s):        Required labs outdated  Required vitals outdated    [facility] action(s):  Defer   Requires appointment : Yes     Requires labs : Yes      Medication Therapy Plan: LOV 11/8/2022; Labs and Vitals also 11/2022 >360days      Appointments  past 12m or future 3m with PCP    Date Provider   Last Visit   11/8/2022 Luis E Leach MD   Next Visit   Visit date not found Luis E Leach MD   ED visits in past 90 days: 0        Note composed:7:10 AM 01/26/2024

## 2024-02-01 ENCOUNTER — PATIENT OUTREACH (OUTPATIENT)
Dept: ADMINISTRATIVE | Facility: HOSPITAL | Age: 89
End: 2024-02-01
Payer: MEDICARE

## 2024-02-01 NOTE — PROGRESS NOTES
Working not seen in 12 months.  Pt last seen Nov 2022.    Spoke with Mr Mei (son) to schedule annual exam, 3/08/24.

## 2024-03-08 ENCOUNTER — LAB VISIT (OUTPATIENT)
Dept: LAB | Facility: HOSPITAL | Age: 89
End: 2024-03-08
Attending: INTERNAL MEDICINE
Payer: MEDICARE

## 2024-03-08 ENCOUNTER — OFFICE VISIT (OUTPATIENT)
Dept: FAMILY MEDICINE | Facility: CLINIC | Age: 89
End: 2024-03-08
Payer: MEDICARE

## 2024-03-08 VITALS
BODY MASS INDEX: 26.03 KG/M2 | HEIGHT: 74 IN | OXYGEN SATURATION: 95 % | WEIGHT: 202.81 LBS | DIASTOLIC BLOOD PRESSURE: 76 MMHG | TEMPERATURE: 98 F | SYSTOLIC BLOOD PRESSURE: 134 MMHG | HEART RATE: 88 BPM

## 2024-03-08 DIAGNOSIS — I10 ESSENTIAL HYPERTENSION: Primary | Chronic | ICD-10-CM

## 2024-03-08 DIAGNOSIS — E78.00 HYPERCHOLESTEREMIA: Chronic | ICD-10-CM

## 2024-03-08 DIAGNOSIS — I10 ESSENTIAL HYPERTENSION: Chronic | ICD-10-CM

## 2024-03-08 DIAGNOSIS — I63.012 CEREBROVASCULAR ACCIDENT (CVA) DUE TO THROMBOSIS OF LEFT VERTEBRAL ARTERY: ICD-10-CM

## 2024-03-08 DIAGNOSIS — G31.84 MILD COGNITIVE IMPAIRMENT WITH MEMORY LOSS: ICD-10-CM

## 2024-03-08 DIAGNOSIS — I35.8 AORTIC VALVE SCLEROSIS: ICD-10-CM

## 2024-03-08 DIAGNOSIS — R01.1 HEART MURMUR: ICD-10-CM

## 2024-03-08 LAB
ALBUMIN SERPL BCP-MCNC: 4 G/DL (ref 3.5–5.2)
ALP SERPL-CCNC: 90 U/L (ref 55–135)
ALT SERPL W/O P-5'-P-CCNC: 11 U/L (ref 10–44)
ANION GAP SERPL CALC-SCNC: 9 MMOL/L (ref 8–16)
AST SERPL-CCNC: 18 U/L (ref 10–40)
BASOPHILS # BLD AUTO: 0.04 K/UL (ref 0–0.2)
BASOPHILS NFR BLD: 0.7 % (ref 0–1.9)
BILIRUB SERPL-MCNC: 0.8 MG/DL (ref 0.1–1)
BUN SERPL-MCNC: 18 MG/DL (ref 8–23)
CALCIUM SERPL-MCNC: 10 MG/DL (ref 8.7–10.5)
CHLORIDE SERPL-SCNC: 103 MMOL/L (ref 95–110)
CHOLEST SERPL-MCNC: 207 MG/DL (ref 120–199)
CHOLEST/HDLC SERPL: 5.2 {RATIO} (ref 2–5)
CO2 SERPL-SCNC: 28 MMOL/L (ref 23–29)
CREAT SERPL-MCNC: 0.9 MG/DL (ref 0.5–1.4)
DIFFERENTIAL METHOD BLD: ABNORMAL
EOSINOPHIL # BLD AUTO: 0.2 K/UL (ref 0–0.5)
EOSINOPHIL NFR BLD: 3.5 % (ref 0–8)
ERYTHROCYTE [DISTWIDTH] IN BLOOD BY AUTOMATED COUNT: 15 % (ref 11.5–14.5)
EST. GFR  (NO RACE VARIABLE): >60 ML/MIN/1.73 M^2
GLUCOSE SERPL-MCNC: 87 MG/DL (ref 70–110)
HCT VFR BLD AUTO: 44.1 % (ref 40–54)
HDLC SERPL-MCNC: 40 MG/DL (ref 40–75)
HDLC SERPL: 19.3 % (ref 20–50)
HGB BLD-MCNC: 14.1 G/DL (ref 14–18)
IMM GRANULOCYTES # BLD AUTO: 0.01 K/UL (ref 0–0.04)
IMM GRANULOCYTES NFR BLD AUTO: 0.2 % (ref 0–0.5)
LDLC SERPL CALC-MCNC: 144.6 MG/DL (ref 63–159)
LYMPHOCYTES # BLD AUTO: 2.8 K/UL (ref 1–4.8)
LYMPHOCYTES NFR BLD: 50.5 % (ref 18–48)
MCH RBC QN AUTO: 27.8 PG (ref 27–31)
MCHC RBC AUTO-ENTMCNC: 32 G/DL (ref 32–36)
MCV RBC AUTO: 87 FL (ref 82–98)
MONOCYTES # BLD AUTO: 0.5 K/UL (ref 0.3–1)
MONOCYTES NFR BLD: 9.2 % (ref 4–15)
NEUTROPHILS # BLD AUTO: 2 K/UL (ref 1.8–7.7)
NEUTROPHILS NFR BLD: 35.9 % (ref 38–73)
NONHDLC SERPL-MCNC: 167 MG/DL
NRBC BLD-RTO: 0 /100 WBC
PLATELET # BLD AUTO: 235 K/UL (ref 150–450)
PMV BLD AUTO: 11.7 FL (ref 9.2–12.9)
POTASSIUM SERPL-SCNC: 4.2 MMOL/L (ref 3.5–5.1)
PROT SERPL-MCNC: 7.5 G/DL (ref 6–8.4)
RBC # BLD AUTO: 5.08 M/UL (ref 4.6–6.2)
SODIUM SERPL-SCNC: 140 MMOL/L (ref 136–145)
TRIGL SERPL-MCNC: 112 MG/DL (ref 30–150)
WBC # BLD AUTO: 5.45 K/UL (ref 3.9–12.7)

## 2024-03-08 PROCEDURE — 99999 PR PBB SHADOW E&M-EST. PATIENT-LVL IV: CPT | Mod: PBBFAC,HCNC,, | Performed by: INTERNAL MEDICINE

## 2024-03-08 PROCEDURE — 80053 COMPREHEN METABOLIC PANEL: CPT | Mod: HCNC | Performed by: INTERNAL MEDICINE

## 2024-03-08 PROCEDURE — 99214 OFFICE O/P EST MOD 30 MIN: CPT | Mod: HCNC,S$GLB,, | Performed by: INTERNAL MEDICINE

## 2024-03-08 PROCEDURE — 1126F AMNT PAIN NOTED NONE PRSNT: CPT | Mod: HCNC,CPTII,S$GLB, | Performed by: INTERNAL MEDICINE

## 2024-03-08 PROCEDURE — 1159F MED LIST DOCD IN RCRD: CPT | Mod: HCNC,CPTII,S$GLB, | Performed by: INTERNAL MEDICINE

## 2024-03-08 PROCEDURE — 80061 LIPID PANEL: CPT | Mod: HCNC | Performed by: INTERNAL MEDICINE

## 2024-03-08 PROCEDURE — 36415 COLL VENOUS BLD VENIPUNCTURE: CPT | Mod: HCNC,PO | Performed by: INTERNAL MEDICINE

## 2024-03-08 PROCEDURE — 85025 COMPLETE CBC W/AUTO DIFF WBC: CPT | Mod: HCNC | Performed by: INTERNAL MEDICINE

## 2024-03-08 NOTE — PROGRESS NOTES
Subjective:       Patient ID: Chinmay Mei is a 89 y.o. male.    Chief Complaint: annual, Hypertension, and Hyperlipidemia    Hypertension  Pertinent negatives include no chest pain, headaches, neck pain, palpitations or shortness of breath.   Hyperlipidemia  Associated symptoms include myalgias. Pertinent negatives include no chest pain or shortness of breath.     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     Past Surgical History:   Procedure Laterality Date    CATARACT EXTRACTION W/  INTRAOCULAR LENS IMPLANT Left 2017    CATARACT EXTRACTION W/  INTRAOCULAR LENS IMPLANT Right 08/15/2018     Family History   Problem Relation Age of Onset    Heart disease Mother     Hypertension Mother     Prostate cancer Father     Diabetes Father     Diabetes Sister     Heart disease Sister     Glaucoma Sister     Hypertension Sister     Ulcers Sister     Heart disease Brother     Diabetes Brother     Hypertension Brother     Kidney disease Daughter         dialysis    Stomach cancer Daughter     Diabetes Son     Hypertension Son     Stroke Brother      Social History     Socioeconomic History    Marital status:    Tobacco Use    Smoking status: Former     Current packs/day: 0.00     Types: Cigarettes     Quit date: 2006     Years since quittin.6    Smokeless tobacco: Never   Substance and Sexual Activity    Alcohol use: No    Drug use: No     Social Determinants of Health     Financial Resource Strain: Low Risk  (2022)    Overall Financial Resource Strain (CARDIA)     Difficulty of Paying Living Expenses: Not very hard   Food Insecurity: No Food Insecurity (2022)    Hunger Vital Sign     Worried About Running Out of Food in the Last Year: Never true     Ran Out of Food in the Last Year: Never true   Recent Concern: Food Insecurity - Food  Insecurity Present (6/21/2022)    Hunger Vital Sign     Worried About Running Out of Food in the Last Year: Never true     Ran Out of Food in the Last Year: Sometimes true   Transportation Needs: No Transportation Needs (7/18/2022)    PRAPARE - Transportation     Lack of Transportation (Medical): No     Lack of Transportation (Non-Medical): No   Physical Activity: Unknown (7/18/2022)    Exercise Vital Sign     Days of Exercise per Week: 0 days   Recent Concern: Physical Activity - Inactive (6/21/2022)    Exercise Vital Sign     Days of Exercise per Week: 0 days     Minutes of Exercise per Session: 0 min   Stress: No Stress Concern Present (7/18/2022)    [employer] - Occupational Stress Questionnaire     Feeling of Stress : Only a little   Social Connections: Moderately Isolated (7/18/2022)    Social Connection and Isolation Panel [NHANES]     Frequency of Communication with Friends and Family: More than three times a week     Frequency of Social Gatherings with Friends and Family: More than three times a week     Attends Sikhism Services: 1 to 4 times per year     Active Member of Clubs or Organizations: No     Attends Club or Organization Meetings: Never     Marital Status:    Housing Stability: Low Risk  (7/18/2022)    Housing Stability Vital Sign     Unable to Pay for Housing in the Last Year: No     Number of Places Lived in the Last Year: 1     Unstable Housing in the Last Year: No     Review of Systems   Constitutional:  Negative for activity change, appetite change, chills, diaphoresis, fatigue, fever and unexpected weight change.   HENT:  Negative for drooling, ear discharge, ear pain, facial swelling, hearing loss, mouth sores, nosebleeds, postnasal drip, rhinorrhea, sinus pressure, sneezing, sore throat, tinnitus, trouble swallowing and voice change.    Eyes:  Negative for photophobia, redness and visual disturbance.   Respiratory:  Negative for apnea, cough, choking,  chest tightness, shortness of breath and wheezing.    Cardiovascular:  Negative for chest pain, palpitations and leg swelling.   Gastrointestinal:  Negative for abdominal distention, abdominal pain, anal bleeding, blood in stool, constipation, diarrhea, nausea and vomiting.   Endocrine: Negative for cold intolerance, heat intolerance, polydipsia, polyphagia and polyuria.   Genitourinary:  Negative for difficulty urinating, dysuria, enuresis, flank pain, frequency, genital sores, hematuria and urgency.   Musculoskeletal:  Positive for arthralgias, gait problem and myalgias. Negative for back pain, joint swelling, neck pain and neck stiffness.   Skin:  Negative for color change, pallor, rash and wound.   Allergic/Immunologic: Negative for food allergies and immunocompromised state.   Neurological:  Negative for dizziness, tremors, seizures, syncope, facial asymmetry, speech difficulty, weakness, light-headedness, numbness and headaches.   Hematological:  Negative for adenopathy. Does not bruise/bleed easily.   Psychiatric/Behavioral:  Negative for agitation, behavioral problems, confusion, decreased concentration, dysphoric mood, hallucinations, self-injury, sleep disturbance and suicidal ideas. The patient is not nervous/anxious and is not hyperactive.        Objective:      Physical Exam  Vitals and nursing note reviewed.   Constitutional:       General: He is not in acute distress.     Appearance: Normal appearance. He is well-developed. He is not diaphoretic.   HENT:      Head: Normocephalic and atraumatic.      Mouth/Throat:      Pharynx: No oropharyngeal exudate.   Eyes:      General: No scleral icterus.     Pupils: Pupils are equal, round, and reactive to light.   Neck:      Thyroid: No thyromegaly.      Vascular: No carotid bruit or JVD.      Trachea: No tracheal deviation.   Cardiovascular:      Rate and Rhythm: Normal rate and regular rhythm.      Heart sounds: Normal heart sounds.   Pulmonary:      Effort:  Pulmonary effort is normal. No respiratory distress.      Breath sounds: Normal breath sounds. No wheezing or rales.   Chest:      Chest wall: No tenderness.   Abdominal:      General: Bowel sounds are normal. There is no distension.      Palpations: Abdomen is soft.      Tenderness: There is no abdominal tenderness. There is no guarding or rebound.   Musculoskeletal:         General: No tenderness. Normal range of motion.      Cervical back: Normal range of motion and neck supple.   Lymphadenopathy:      Cervical: No cervical adenopathy.   Skin:     General: Skin is warm and dry.      Coloration: Skin is not pale.      Findings: No erythema or rash.   Neurological:      Mental Status: He is alert and oriented to person, place, and time.   Psychiatric:         Behavior: Behavior normal.         Thought Content: Thought content normal.         Judgment: Judgment normal.         CMP  Sodium   Date Value Ref Range Status   11/08/2022 138 136 - 145 mmol/L Final     Potassium   Date Value Ref Range Status   11/08/2022 4.1 3.5 - 5.1 mmol/L Final     Chloride   Date Value Ref Range Status   11/08/2022 102 95 - 110 mmol/L Final     CO2   Date Value Ref Range Status   11/08/2022 26 23 - 29 mmol/L Final     Glucose   Date Value Ref Range Status   11/08/2022 76 70 - 110 mg/dL Final     BUN   Date Value Ref Range Status   11/08/2022 14 8 - 23 mg/dL Final     Creatinine   Date Value Ref Range Status   11/08/2022 1.0 0.5 - 1.4 mg/dL Final     Calcium   Date Value Ref Range Status   11/08/2022 9.8 8.7 - 10.5 mg/dL Final     Total Protein   Date Value Ref Range Status   11/08/2022 7.7 6.0 - 8.4 g/dL Final     Albumin   Date Value Ref Range Status   11/08/2022 4.1 3.5 - 5.2 g/dL Final     Total Bilirubin   Date Value Ref Range Status   11/08/2022 1.1 (H) 0.1 - 1.0 mg/dL Final     Comment:     For infants and newborns, interpretation of results should be based  on gestational age, weight and in agreement with  clinical  observations.    Premature Infant recommended reference ranges:  Up to 24 hours.............<8.0 mg/dL  Up to 48 hours............<12.0 mg/dL  3-5 days..................<15.0 mg/dL  6-29 days.................<15.0 mg/dL       Alkaline Phosphatase   Date Value Ref Range Status   11/08/2022 85 55 - 135 U/L Final     AST   Date Value Ref Range Status   11/08/2022 20 10 - 40 U/L Final     ALT   Date Value Ref Range Status   11/08/2022 15 10 - 44 U/L Final     Anion Gap   Date Value Ref Range Status   11/08/2022 10 8 - 16 mmol/L Final     eGFR if    Date Value Ref Range Status   11/04/2021 >60.0 >60 mL/min/1.73 m^2 Final     eGFR if non    Date Value Ref Range Status   11/04/2021 >60.0 >60 mL/min/1.73 m^2 Final     Comment:     Calculation used to obtain the estimated glomerular filtration  rate (eGFR) is the CKD-EPI equation.        Lab Results   Component Value Date    WBC 6.26 11/08/2022    HGB 13.8 (L) 11/08/2022    HCT 44.6 11/08/2022    MCV 89 11/08/2022     11/08/2022     Lab Results   Component Value Date    CHOL 211 (H) 11/08/2022     Lab Results   Component Value Date    HDL 40 11/08/2022     Lab Results   Component Value Date    LDLCALC 145.6 11/08/2022     Lab Results   Component Value Date    TRIG 127 11/08/2022     Lab Results   Component Value Date    CHOLHDL 19.0 (L) 11/08/2022     Lab Results   Component Value Date    TSH 0.454 07/27/2020     Lab Results   Component Value Date    HGBA1C 5.5 07/27/2020     Assessment:       1. Essential hypertension    2. Hypercholesteremia    3. Heart murmur    4. Aortic valve sclerosis    5. Mild cognitive impairment with memory loss    6. Cerebrovascular accident (CVA) due to thrombosis of left vertebral artery        Plan:   Essential hypertension  -     Comprehensive Metabolic Panel; Future; Expected date: 03/08/2024  -     CBC Auto Differential; Future; Expected date: 03/08/2024    Hypercholesteremia  -     Lipid  Panel; Future; Expected date: 03/08/2024    Heart murmur    Aortic valve sclerosis    Mild cognitive impairment with memory loss    Cerebrovascular accident (CVA) due to thrombosis of left vertebral artery      Stable--------------------continue meds-------------as above--------------f/u 6 months--------

## 2024-04-08 RX ORDER — AMLODIPINE BESYLATE 5 MG/1
5 TABLET ORAL 2 TIMES DAILY
Qty: 180 TABLET | Refills: 3 | Status: SHIPPED | OUTPATIENT
Start: 2024-04-08

## 2024-04-08 RX ORDER — LOSARTAN POTASSIUM 100 MG/1
TABLET ORAL
Qty: 90 TABLET | Refills: 3 | Status: SHIPPED | OUTPATIENT
Start: 2024-04-08

## 2024-04-08 NOTE — TELEPHONE ENCOUNTER
Refill Decision Note   Chinmay Mei  is requesting a refill authorization.  Brief Assessment and Rationale for Refill:  Approve     Medication Therapy Plan:         Comments:     Note composed:2:38 PM 04/08/2024             Appointments     Last Visit   3/8/2024 Luis E Leach MD   Next Visit   9/9/2024 Luis E Leach MD

## 2024-04-08 NOTE — TELEPHONE ENCOUNTER
No care due was identified.  [facility] Embedded Care Due Messages. Reference number: [id number].   4/08/2024 1:30:13 AM CDT

## 2024-06-10 ENCOUNTER — PATIENT MESSAGE (OUTPATIENT)
Dept: FAMILY MEDICINE | Facility: CLINIC | Age: 89
End: 2024-06-10
Payer: MEDICARE

## 2024-09-09 ENCOUNTER — OFFICE VISIT (OUTPATIENT)
Dept: FAMILY MEDICINE | Facility: CLINIC | Age: 89
End: 2024-09-09
Payer: MEDICARE

## 2024-09-09 ENCOUNTER — LAB VISIT (OUTPATIENT)
Dept: LAB | Facility: HOSPITAL | Age: 89
End: 2024-09-09
Attending: INTERNAL MEDICINE
Payer: MEDICARE

## 2024-09-09 VITALS
SYSTOLIC BLOOD PRESSURE: 138 MMHG | DIASTOLIC BLOOD PRESSURE: 80 MMHG | WEIGHT: 199.06 LBS | RESPIRATION RATE: 18 BRPM | BODY MASS INDEX: 25.55 KG/M2 | TEMPERATURE: 98 F | HEIGHT: 74 IN

## 2024-09-09 DIAGNOSIS — I63.012 CEREBROVASCULAR ACCIDENT (CVA) DUE TO THROMBOSIS OF LEFT VERTEBRAL ARTERY: ICD-10-CM

## 2024-09-09 DIAGNOSIS — I10 ESSENTIAL HYPERTENSION: Chronic | ICD-10-CM

## 2024-09-09 DIAGNOSIS — R60.9 EDEMA, UNSPECIFIED TYPE: ICD-10-CM

## 2024-09-09 DIAGNOSIS — I35.0 AORTIC VALVE STENOSIS, ETIOLOGY OF CARDIAC VALVE DISEASE UNSPECIFIED: Primary | ICD-10-CM

## 2024-09-09 DIAGNOSIS — E78.00 HYPERCHOLESTEREMIA: Chronic | ICD-10-CM

## 2024-09-09 DIAGNOSIS — G31.84 MILD COGNITIVE IMPAIRMENT WITH MEMORY LOSS: ICD-10-CM

## 2024-09-09 LAB
ALBUMIN SERPL BCP-MCNC: 4.1 G/DL (ref 3.5–5.2)
ALP SERPL-CCNC: 96 U/L (ref 55–135)
ALT SERPL W/O P-5'-P-CCNC: 10 U/L (ref 10–44)
ANION GAP SERPL CALC-SCNC: 10 MMOL/L (ref 8–16)
AST SERPL-CCNC: 22 U/L (ref 10–40)
BASOPHILS # BLD AUTO: 0.03 K/UL (ref 0–0.2)
BASOPHILS NFR BLD: 0.5 % (ref 0–1.9)
BILIRUB SERPL-MCNC: 1 MG/DL (ref 0.1–1)
BUN SERPL-MCNC: 19 MG/DL (ref 8–23)
CALCIUM SERPL-MCNC: 9.4 MG/DL (ref 8.7–10.5)
CHLORIDE SERPL-SCNC: 106 MMOL/L (ref 95–110)
CO2 SERPL-SCNC: 25 MMOL/L (ref 23–29)
CREAT SERPL-MCNC: 0.9 MG/DL (ref 0.5–1.4)
DIFFERENTIAL METHOD BLD: ABNORMAL
EOSINOPHIL # BLD AUTO: 0.1 K/UL (ref 0–0.5)
EOSINOPHIL NFR BLD: 2.1 % (ref 0–8)
ERYTHROCYTE [DISTWIDTH] IN BLOOD BY AUTOMATED COUNT: 15.8 % (ref 11.5–14.5)
EST. GFR  (NO RACE VARIABLE): >60 ML/MIN/1.73 M^2
GLUCOSE SERPL-MCNC: 88 MG/DL (ref 70–110)
HCT VFR BLD AUTO: 43.9 % (ref 40–54)
HGB BLD-MCNC: 13.8 G/DL (ref 14–18)
IMM GRANULOCYTES # BLD AUTO: 0.01 K/UL (ref 0–0.04)
IMM GRANULOCYTES NFR BLD AUTO: 0.2 % (ref 0–0.5)
LYMPHOCYTES # BLD AUTO: 2.8 K/UL (ref 1–4.8)
LYMPHOCYTES NFR BLD: 48.3 % (ref 18–48)
MCH RBC QN AUTO: 27.4 PG (ref 27–31)
MCHC RBC AUTO-ENTMCNC: 31.4 G/DL (ref 32–36)
MCV RBC AUTO: 87 FL (ref 82–98)
MONOCYTES # BLD AUTO: 0.5 K/UL (ref 0.3–1)
MONOCYTES NFR BLD: 9.5 % (ref 4–15)
NEUTROPHILS # BLD AUTO: 2.2 K/UL (ref 1.8–7.7)
NEUTROPHILS NFR BLD: 39.4 % (ref 38–73)
NRBC BLD-RTO: 0 /100 WBC
PLATELET # BLD AUTO: 216 K/UL (ref 150–450)
PMV BLD AUTO: 12.5 FL (ref 9.2–12.9)
POTASSIUM SERPL-SCNC: 5.2 MMOL/L (ref 3.5–5.1)
PROT SERPL-MCNC: 8.2 G/DL (ref 6–8.4)
RBC # BLD AUTO: 5.04 M/UL (ref 4.6–6.2)
SODIUM SERPL-SCNC: 141 MMOL/L (ref 136–145)
WBC # BLD AUTO: 5.69 K/UL (ref 3.9–12.7)

## 2024-09-09 PROCEDURE — 1159F MED LIST DOCD IN RCRD: CPT | Mod: HCNC,CPTII,S$GLB, | Performed by: INTERNAL MEDICINE

## 2024-09-09 PROCEDURE — 99999 PR PBB SHADOW E&M-EST. PATIENT-LVL III: CPT | Mod: PBBFAC,HCNC,, | Performed by: INTERNAL MEDICINE

## 2024-09-09 PROCEDURE — 36415 COLL VENOUS BLD VENIPUNCTURE: CPT | Mod: HCNC,PO | Performed by: INTERNAL MEDICINE

## 2024-09-09 PROCEDURE — 3288F FALL RISK ASSESSMENT DOCD: CPT | Mod: HCNC,CPTII,S$GLB, | Performed by: INTERNAL MEDICINE

## 2024-09-09 PROCEDURE — 85025 COMPLETE CBC W/AUTO DIFF WBC: CPT | Mod: HCNC | Performed by: INTERNAL MEDICINE

## 2024-09-09 PROCEDURE — G2211 COMPLEX E/M VISIT ADD ON: HCPCS | Mod: HCNC,S$GLB,, | Performed by: INTERNAL MEDICINE

## 2024-09-09 PROCEDURE — 99214 OFFICE O/P EST MOD 30 MIN: CPT | Mod: HCNC,S$GLB,, | Performed by: INTERNAL MEDICINE

## 2024-09-09 PROCEDURE — 1126F AMNT PAIN NOTED NONE PRSNT: CPT | Mod: HCNC,CPTII,S$GLB, | Performed by: INTERNAL MEDICINE

## 2024-09-09 PROCEDURE — 80053 COMPREHEN METABOLIC PANEL: CPT | Mod: HCNC | Performed by: INTERNAL MEDICINE

## 2024-09-09 PROCEDURE — 1101F PT FALLS ASSESS-DOCD LE1/YR: CPT | Mod: HCNC,CPTII,S$GLB, | Performed by: INTERNAL MEDICINE

## 2024-09-09 RX ORDER — ATORVASTATIN CALCIUM 40 MG/1
40 TABLET, FILM COATED ORAL NIGHTLY
Qty: 90 TABLET | Refills: 3 | Status: SHIPPED | OUTPATIENT
Start: 2024-09-09 | End: 2025-09-09

## 2024-09-09 RX ORDER — HYDRALAZINE HYDROCHLORIDE 50 MG/1
50 TABLET, FILM COATED ORAL EVERY 12 HOURS
Qty: 180 TABLET | Refills: 3 | Status: SHIPPED | OUTPATIENT
Start: 2024-09-09

## 2024-09-09 RX ORDER — ASPIRIN 81 MG/1
81 TABLET ORAL DAILY
Qty: 90 TABLET | Refills: 3 | Status: SHIPPED | OUTPATIENT
Start: 2024-09-09 | End: 2025-09-09

## 2024-09-09 NOTE — PROGRESS NOTES
Subjective:       Patient ID: Chinmay Mei is a 89 y.o. male.    Chief Complaint: Follow-up, Hypertension, Hyperlipidemia, and Cerebrovascular Accident    Here with son-    Follow-up  Associated symptoms include arthralgias, myalgias and weakness. Pertinent negatives include no abdominal pain, chest pain, chills, coughing, diaphoresis, fatigue, fever, headaches, joint swelling, nausea, neck pain, numbness, rash, sore throat or vomiting.   Hypertension  Pertinent negatives include no chest pain, headaches, neck pain, palpitations or shortness of breath.   Hyperlipidemia  Associated symptoms include myalgias. Pertinent negatives include no chest pain or shortness of breath.   Cerebrovascular Accident  Associated symptoms include arthralgias, myalgias and weakness. Pertinent negatives include no abdominal pain, chest pain, chills, coughing, diaphoresis, fatigue, fever, headaches, joint swelling, nausea, neck pain, numbness, rash, sore throat or vomiting.     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     Past Surgical History:   Procedure Laterality Date    CATARACT EXTRACTION W/  INTRAOCULAR LENS IMPLANT Left 02/22/2017    CATARACT EXTRACTION W/  INTRAOCULAR LENS IMPLANT Right 08/15/2018     Family History   Problem Relation Name Age of Onset    Heart disease Mother      Hypertension Mother      Prostate cancer Father      Diabetes Father      Diabetes Sister      Heart disease Sister      Glaucoma Sister      Hypertension Sister      Ulcers Sister      Heart disease Brother      Diabetes Brother      Hypertension Brother      Kidney disease Daughter          dialysis    Stomach cancer Daughter      Diabetes Son      Hypertension Son      Stroke Brother       Social History     Socioeconomic History    Marital status:    Tobacco Use    Smoking status: Former      Current packs/day: 0.00     Types: Cigarettes     Quit date: 2006     Years since quittin.1    Smokeless tobacco: Never   Substance and Sexual Activity    Alcohol use: No    Drug use: No     Social Determinants of Health     Financial Resource Strain: Low Risk  (2022)    Overall Financial Resource Strain (CARDIA)     Difficulty of Paying Living Expenses: Not very hard   Food Insecurity: No Food Insecurity (2022)    Hunger Vital Sign     Worried About Running Out of Food in the Last Year: Never true     Ran Out of Food in the Last Year: Never true   Recent Concern: Food Insecurity - Food Insecurity Present (2022)    Hunger Vital Sign     Worried About Running Out of Food in the Last Year: Never true     Ran Out of Food in the Last Year: Sometimes true   Transportation Needs: No Transportation Needs (2022)    PRAPARE - Transportation     Lack of Transportation (Medical): No     Lack of Transportation (Non-Medical): No   Physical Activity: Unknown (2022)    Exercise Vital Sign     Days of Exercise per Week: 0 days   Recent Concern: Physical Activity - Inactive (2022)    Exercise Vital Sign     Days of Exercise per Week: 0 days     Minutes of Exercise per Session: 0 min   Stress: No Stress Concern Present (2022)    [employer] - Occupational Stress Questionnaire     Feeling of Stress : Only a little   Housing Stability: Low Risk  (2022)    Housing Stability Vital Sign     Unable to Pay for Housing in the Last Year: No     Number of Places Lived in the Last Year: 1     Unstable Housing in the Last Year: No     Review of Systems   Constitutional:  Negative for activity change, appetite change, chills, diaphoresis, fatigue, fever and unexpected weight change.   HENT:  Negative for drooling, ear discharge, ear pain, facial swelling, hearing loss, mouth sores, nosebleeds, postnasal drip, rhinorrhea, sinus pressure, sneezing, sore throat, tinnitus,  trouble swallowing and voice change.    Eyes:  Negative for photophobia, redness and visual disturbance.   Respiratory:  Negative for apnea, cough, choking, chest tightness, shortness of breath and wheezing.    Cardiovascular:  Positive for leg swelling. Negative for chest pain and palpitations.   Gastrointestinal:  Negative for abdominal distention, abdominal pain, anal bleeding, blood in stool, constipation, diarrhea, nausea and vomiting.   Endocrine: Negative for cold intolerance, heat intolerance, polydipsia, polyphagia and polyuria.   Genitourinary:  Negative for difficulty urinating, dysuria, enuresis, flank pain, frequency, genital sores, hematuria and urgency.   Musculoskeletal:  Positive for arthralgias and myalgias. Negative for back pain, gait problem, joint swelling, neck pain and neck stiffness.   Skin:  Negative for color change, pallor, rash and wound.   Allergic/Immunologic: Negative for food allergies and immunocompromised state.   Neurological:  Positive for weakness. Negative for dizziness, tremors, seizures, syncope, facial asymmetry, speech difficulty, light-headedness, numbness and headaches.   Hematological:  Negative for adenopathy. Does not bruise/bleed easily.   Psychiatric/Behavioral:  Negative for agitation, behavioral problems, confusion, decreased concentration, dysphoric mood, hallucinations, self-injury, sleep disturbance and suicidal ideas. The patient is not nervous/anxious and is not hyperactive.        Objective:      Physical Exam  Vitals and nursing note reviewed.   Constitutional:       General: He is not in acute distress.     Appearance: Normal appearance. He is well-developed. He is not diaphoretic.   HENT:      Head: Normocephalic and atraumatic.      Mouth/Throat:      Pharynx: No oropharyngeal exudate.   Eyes:      General: No scleral icterus.     Pupils: Pupils are equal, round, and reactive to light.   Neck:      Thyroid: No thyromegaly.      Vascular: No carotid bruit  or JVD.      Trachea: No tracheal deviation.   Cardiovascular:      Rate and Rhythm: Normal rate and regular rhythm.      Heart sounds: Normal heart sounds.   Pulmonary:      Effort: Pulmonary effort is normal. No respiratory distress.      Breath sounds: Normal breath sounds. No wheezing or rales.   Chest:      Chest wall: No tenderness.   Abdominal:      General: Bowel sounds are normal. There is no distension.      Palpations: Abdomen is soft.      Tenderness: There is no abdominal tenderness. There is no guarding or rebound.   Musculoskeletal:         General: No tenderness. Normal range of motion.      Cervical back: Normal range of motion and neck supple.   Lymphadenopathy:      Cervical: No cervical adenopathy.   Skin:     General: Skin is warm and dry.      Coloration: Skin is not pale.      Findings: No erythema or rash.   Neurological:      Mental Status: He is alert and oriented to person, place, and time.         CMP  Sodium   Date Value Ref Range Status   03/08/2024 140 136 - 145 mmol/L Final     Potassium   Date Value Ref Range Status   03/08/2024 4.2 3.5 - 5.1 mmol/L Final     Chloride   Date Value Ref Range Status   03/08/2024 103 95 - 110 mmol/L Final     CO2   Date Value Ref Range Status   03/08/2024 28 23 - 29 mmol/L Final     Glucose   Date Value Ref Range Status   03/08/2024 87 70 - 110 mg/dL Final     BUN   Date Value Ref Range Status   03/08/2024 18 8 - 23 mg/dL Final     Creatinine   Date Value Ref Range Status   03/08/2024 0.9 0.5 - 1.4 mg/dL Final     Calcium   Date Value Ref Range Status   03/08/2024 10.0 8.7 - 10.5 mg/dL Final     Total Protein   Date Value Ref Range Status   03/08/2024 7.5 6.0 - 8.4 g/dL Final     Albumin   Date Value Ref Range Status   03/08/2024 4.0 3.5 - 5.2 g/dL Final     Total Bilirubin   Date Value Ref Range Status   03/08/2024 0.8 0.1 - 1.0 mg/dL Final     Comment:     For infants and newborns, interpretation of results should be based  on gestational age, weight  and in agreement with clinical  observations.    Premature Infant recommended reference ranges:  Up to 24 hours.............<8.0 mg/dL  Up to 48 hours............<12.0 mg/dL  3-5 days..................<15.0 mg/dL  6-29 days.................<15.0 mg/dL       Alkaline Phosphatase   Date Value Ref Range Status   03/08/2024 90 55 - 135 U/L Final     AST   Date Value Ref Range Status   03/08/2024 18 10 - 40 U/L Final     ALT   Date Value Ref Range Status   03/08/2024 11 10 - 44 U/L Final     Anion Gap   Date Value Ref Range Status   03/08/2024 9 8 - 16 mmol/L Final     eGFR if    Date Value Ref Range Status   11/04/2021 >60.0 >60 mL/min/1.73 m^2 Final     eGFR if non    Date Value Ref Range Status   11/04/2021 >60.0 >60 mL/min/1.73 m^2 Final     Comment:     Calculation used to obtain the estimated glomerular filtration  rate (eGFR) is the CKD-EPI equation.        Lab Results   Component Value Date    WBC 5.45 03/08/2024    HGB 14.1 03/08/2024    HCT 44.1 03/08/2024    MCV 87 03/08/2024     03/08/2024     Lab Results   Component Value Date    CHOL 207 (H) 03/08/2024     Lab Results   Component Value Date    HDL 40 03/08/2024     Lab Results   Component Value Date    LDLCALC 144.6 03/08/2024     Lab Results   Component Value Date    TRIG 112 03/08/2024     Lab Results   Component Value Date    CHOLHDL 19.3 (L) 03/08/2024     Lab Results   Component Value Date    TSH 0.454 07/27/2020     Lab Results   Component Value Date    HGBA1C 5.5 07/27/2020     Assessment:       1. Aortic valve stenosis, etiology of cardiac valve disease unspecified    2. Cerebrovascular accident (CVA) due to thrombosis of left vertebral artery    3. Essential hypertension    4. Hypercholesteremia    5. Mild cognitive impairment with memory loss    6. Edema, unspecified type        Plan:   Aortic valve stenosis, etiology of cardiac valve disease unspecified  -     Ambulatory referral/consult to Cardiology;  Future; Expected date: 09/16/2024    Cerebrovascular accident (CVA) due to thrombosis of left vertebral artery    Essential hypertension  -     Comprehensive Metabolic Panel; Future; Expected date: 09/09/2024  -     CBC Auto Differential; Future; Expected date: 09/09/2024    Hypercholesteremia    Mild cognitive impairment with memory loss    Edema, unspecified type    Other orders  -     atorvastatin (LIPITOR) 40 MG tablet; Take 1 tablet (40 mg total) by mouth every evening.  Dispense: 90 tablet; Refill: 3  -     hydrALAZINE (APRESOLINE) 50 MG tablet; Take 1 tablet (50 mg total) by mouth every 12 (twelve) hours.  Dispense: 180 tablet; Refill: 3  -     aspirin (ECOTRIN) 81 MG EC tablet; Take 1 tablet (81 mg total) by mouth once daily.  Dispense: 90 tablet; Refill: 3    Stable----------continue meds-----------as above--------------f/u 6 months---------------

## 2024-09-18 ENCOUNTER — TELEPHONE (OUTPATIENT)
Dept: FAMILY MEDICINE | Facility: CLINIC | Age: 89
End: 2024-09-18
Payer: MEDICARE

## 2024-09-18 NOTE — TELEPHONE ENCOUNTER
Pharmacist at [facility] notified ok to fill aspirin, they verbalized understanding.

## 2024-09-18 NOTE — TELEPHONE ENCOUNTER
Aspirin rx was sent to [facility], they are wanting to know if this is to be filled bc pt has an aspirin allergy listed in chart. Please advise.

## 2024-09-18 NOTE — TELEPHONE ENCOUNTER
----- Message from Mirna Coelho sent at 9/18/2024 10:29 AM CDT -----  Contact: Raymundo contreras [phone]  [email or web address]     Patient is calling for Medical Advice regarding:    [facility] a call back or [facility]:Call back     Comments:[facility] would like to clarified a strip that pharm received aspen ec 81mg tablet.    Please advise patient replies from provider may take up to 48 hours.

## 2024-10-04 NOTE — TELEPHONE ENCOUNTER
No SOB   Symptoms 2 weeks now   ---does not think its from amlodipine    Scheduled for d/c on 10/4, if pt remains admitted PT will follow

## 2024-10-08 DIAGNOSIS — I10 ESSENTIAL HYPERTENSION: Chronic | ICD-10-CM

## 2024-10-08 DIAGNOSIS — Z76.89 ENCOUNTER TO ESTABLISH CARE WITH NEW DOCTOR: Primary | ICD-10-CM

## 2024-10-08 DIAGNOSIS — R01.1 HEART MURMUR: ICD-10-CM

## 2024-10-15 PROBLEM — I70.0 AORTIC ATHEROSCLEROSIS: Status: ACTIVE | Noted: 2024-10-15

## 2024-10-22 DIAGNOSIS — I35.8 AORTIC VALVE SCLEROSIS: ICD-10-CM

## 2024-10-22 DIAGNOSIS — R01.1 HEART MURMUR: Primary | ICD-10-CM

## 2024-11-11 DIAGNOSIS — I10 ESSENTIAL HYPERTENSION: Chronic | ICD-10-CM

## 2024-11-11 DIAGNOSIS — I63.012 CEREBROVASCULAR ACCIDENT (CVA) DUE TO THROMBOSIS OF LEFT VERTEBRAL ARTERY: Primary | ICD-10-CM

## 2024-11-19 ENCOUNTER — HOSPITAL ENCOUNTER (OUTPATIENT)
Dept: CARDIOLOGY | Facility: HOSPITAL | Age: 89
Discharge: HOME | End: 2024-11-19
Attending: INTERNAL MEDICINE
Payer: MEDICARE

## 2024-11-19 ENCOUNTER — OFFICE VISIT (OUTPATIENT)
Dept: CARDIOLOGY | Facility: CLINIC | Age: 89
End: 2024-11-19
Payer: MEDICARE

## 2024-11-19 ENCOUNTER — OFFICE VISIT (OUTPATIENT)
Dept: INTERNAL MEDICINE | Facility: CLINIC | Age: 89
End: 2024-11-19
Payer: MEDICARE

## 2024-11-19 ENCOUNTER — PATIENT MESSAGE (OUTPATIENT)
Dept: CARDIOLOGY | Facility: HOSPITAL | Age: 89
End: 2024-11-19
Payer: MEDICARE

## 2024-11-19 VITALS
BODY MASS INDEX: 25.83 KG/M2 | HEIGHT: 74 IN | HEART RATE: 76 BPM | RESPIRATION RATE: 20 BRPM | WEIGHT: 201.25 LBS | SYSTOLIC BLOOD PRESSURE: 148 MMHG | DIASTOLIC BLOOD PRESSURE: 96 MMHG

## 2024-11-19 VITALS
DIASTOLIC BLOOD PRESSURE: 82 MMHG | WEIGHT: 201.25 LBS | SYSTOLIC BLOOD PRESSURE: 139 MMHG | OXYGEN SATURATION: 97 % | HEIGHT: 74 IN | BODY MASS INDEX: 25.83 KG/M2 | HEART RATE: 57 BPM

## 2024-11-19 DIAGNOSIS — I10 ESSENTIAL HYPERTENSION: Chronic | ICD-10-CM

## 2024-11-19 DIAGNOSIS — Z00.00 ENCOUNTER FOR PREVENTIVE HEALTH EXAMINATION: ICD-10-CM

## 2024-11-19 DIAGNOSIS — I70.0 AORTIC ATHEROSCLEROSIS: ICD-10-CM

## 2024-11-19 DIAGNOSIS — I49.3 PVC (PREMATURE VENTRICULAR CONTRACTION): ICD-10-CM

## 2024-11-19 DIAGNOSIS — G31.84 MILD COGNITIVE IMPAIRMENT WITH MEMORY LOSS: ICD-10-CM

## 2024-11-19 DIAGNOSIS — I63.012 CEREBROVASCULAR ACCIDENT (CVA) DUE TO THROMBOSIS OF LEFT VERTEBRAL ARTERY: ICD-10-CM

## 2024-11-19 DIAGNOSIS — I35.0 AORTIC VALVE STENOSIS, ETIOLOGY OF CARDIAC VALVE DISEASE UNSPECIFIED: ICD-10-CM

## 2024-11-19 DIAGNOSIS — H40.013 OPEN ANGLE WITH BORDERLINE FINDINGS, LOW RISK, BILATERAL: ICD-10-CM

## 2024-11-19 DIAGNOSIS — R53.81 DEBILITY: ICD-10-CM

## 2024-11-19 DIAGNOSIS — E78.00 HYPERCHOLESTEREMIA: Chronic | ICD-10-CM

## 2024-11-19 DIAGNOSIS — R32 URINARY INCONTINENCE, UNSPECIFIED TYPE: ICD-10-CM

## 2024-11-19 DIAGNOSIS — I35.8 AORTIC VALVE SCLEROSIS: ICD-10-CM

## 2024-11-19 DIAGNOSIS — Z96.1 PSEUDOPHAKIA: ICD-10-CM

## 2024-11-19 DIAGNOSIS — I63.012 CEREBROVASCULAR ACCIDENT (CVA) DUE TO THROMBOSIS OF LEFT VERTEBRAL ARTERY: Primary | ICD-10-CM

## 2024-11-19 DIAGNOSIS — Z59.9 FINANCIAL DIFFICULTIES: ICD-10-CM

## 2024-11-19 DIAGNOSIS — I35.0 AORTIC VALVE STENOSIS, ETIOLOGY OF CARDIAC VALVE DISEASE UNSPECIFIED: Primary | ICD-10-CM

## 2024-11-19 DIAGNOSIS — Z99.89 DEPENDENCE ON OTHER ENABLING MACHINES AND DEVICES: ICD-10-CM

## 2024-11-19 DIAGNOSIS — R26.9 ABNORMALITY OF GAIT AND MOBILITY: ICD-10-CM

## 2024-11-19 DIAGNOSIS — R01.1 HEART MURMUR: ICD-10-CM

## 2024-11-19 DIAGNOSIS — R97.20 ELEVATED PSA: ICD-10-CM

## 2024-11-19 DIAGNOSIS — R60.0 LOCALIZED EDEMA: ICD-10-CM

## 2024-11-19 DIAGNOSIS — I77.89 OTHER SPECIFIED DISORDERS OF ARTERIES AND ARTERIOLES: ICD-10-CM

## 2024-11-19 DIAGNOSIS — I70.0 AORTIC CALCIFICATION: ICD-10-CM

## 2024-11-19 DIAGNOSIS — I73.9 PVD (PERIPHERAL VASCULAR DISEASE): ICD-10-CM

## 2024-11-19 DIAGNOSIS — Z00.00 ENCOUNTER FOR MEDICARE ANNUAL WELLNESS EXAM: ICD-10-CM

## 2024-11-19 PROCEDURE — 99999 PR PBB SHADOW E&M-EST. PATIENT-LVL IV: CPT | Mod: PBBFAC,HCNC,, | Performed by: INTERNAL MEDICINE

## 2024-11-19 PROCEDURE — 99999 PR PBB SHADOW E&M-EST. PATIENT-LVL V: CPT | Mod: PBBFAC,HCNC,, | Performed by: NURSE PRACTITIONER

## 2024-11-19 PROCEDURE — 93010 ELECTROCARDIOGRAM REPORT: CPT | Mod: HCNC,,, | Performed by: INTERNAL MEDICINE

## 2024-11-19 PROCEDURE — 93005 ELECTROCARDIOGRAM TRACING: CPT | Mod: HCNC

## 2024-11-19 RX ORDER — AMLODIPINE BESYLATE 5 MG/1
5 TABLET ORAL DAILY
Qty: 90 TABLET | Refills: 3 | Status: SHIPPED | OUTPATIENT
Start: 2024-11-19

## 2024-11-19 RX ORDER — HYDROCHLOROTHIAZIDE 25 MG/1
25 TABLET ORAL DAILY
Qty: 30 TABLET | Refills: 11 | Status: SHIPPED | OUTPATIENT
Start: 2024-11-19

## 2024-11-19 SDOH — SOCIAL DETERMINANTS OF HEALTH (SDOH): PROBLEM RELATED TO HOUSING AND ECONOMIC CIRCUMSTANCES, UNSPECIFIED: Z59.9

## 2024-11-19 NOTE — PROGRESS NOTES
"  Chinmay Mei presented for a  Medicare AWV and comprehensive Health Risk Assessment today. The following components were reviewed and updated:    Medical history  Family History  Social history  Allergies and Current Medications  Health Risk Assessment  Health Maintenance  Care Team     Patient screened moderate and/or high risk for one or more social determinants of health (SDOH). Patient connected to community resources through the ED Navigator.    SonChinmay, and grandson, Lillian, in with patient for clinic visit    ** See Completed Assessments for Annual Wellness Visit within the encounter summary.**         The following assessments were completed:  Living Situation  CAGE  Depression Screening  Timed Get Up and Go  Whisper Test  Cognitive Function Screening  Nutrition Screening  ADL Screening  PAQ Screening      Opioid documentation:      Patient does not have a current opioid prescription.        Vitals:    11/19/24 1451   BP: (!) 148/96   BP Location: Right arm   Patient Position: Sitting   Pulse: 76   Resp: 20   Weight: 91.3 kg (201 lb 4.5 oz)   Height:    Pain scale 6' 2" (1.88 m)    0     Body mass index is 25.84 kg/m².  Physical Exam  Constitutional:       General: He is not in acute distress.     Appearance: He is not ill-appearing or diaphoretic.      Comments: Frail, elderly   HENT:      Mouth/Throat:      Mouth: Mucous membranes are moist.   Eyes:      General:         Right eye: No discharge.         Left eye: No discharge.      Conjunctiva/sclera: Conjunctivae normal.   Cardiovascular:      Rate and Rhythm: Normal rate and regular rhythm.      Heart sounds: Murmur heard.   Pulmonary:      Effort: Pulmonary effort is normal. No respiratory distress.      Comments: BBS diminished  Skin:     General: Skin is warm and dry.   Neurological:      Mental Status: He is alert and oriented to person, place, and time. Mental status is at baseline.      Gait: Gait abnormal.   Psychiatric:         Mood and " Affect: Mood normal.         Behavior: Behavior normal.         Thought Content: Thought content normal.         Judgment: Judgment normal.       Diagnoses and health risks identified today and associated recommendations/orders:    1. Encounter for Medicare annual wellness exam, Encounter for preventive health examination, Financial difficulties  Review for opioid screening: Patient does not have Rx for Opioids  Review for substance use disorder: Patient does not use substance per chart    Patient states he does not drink alcohol    I offered to discuss advanced care planning, including how to pick a person who would make decisions for you if you were unable to make them for yourself, called a health care power of , and what kind of decisions you might make such as use of life sustaining treatments such as ventilators and tube feeding when faced with a life limiting illness recorded on a living will that they will need to know. (How you want to be cared for as you near the end of your natural life)     X Patient is interested in learning more about how to make advanced directives.  I provided them paperwork and offered to discuss this with them.  - Ambulatory Referral/Consult to Enhanced Annual Wellness Visit (eAWV)  - Ambulatory referral/consult to Outpatient Case Management- Meals on Wheels    2. Cerebrovascular accident (CVA) due to thrombosis of left vertebral artery, Mild cognitive impairment with memory loss  Monitor  Follow up as directed  Fall precautions  Continue home cane    3. Debility, Dependence on other enabling machines and devices, Abnormality of gait and mobility, Urinary incontinence   Monitor  Follow up with PCP  Patient debilitated and high risk for fall. His grandson lives with him and helps take care of him at home. Patient has Medicaid and is also a . Discussed with patient and his son that the [facility] and [facility] both have programs to assist with Care givers at home and  "encouraged them to look into to see if patient can qualify.   Case management consult for Meals on Wheels    4. Aortic atherosclerosis  3/17/2018 Cxr- There is uncoiling and atherosclerosis of the aorta.    Monitor  Follow up with Cardiology  Blood pressure control    5. Open angle with borderline findings, low risk, bilateral, Pseudophakia  Monitor  Follow up with Ophtho    6. Essential hypertension  Monitor  Stable  Continue home norvasc, hydralazine, losartan    7. Aortic valve sclerosis, Aortic valve stenosis, etiology of cardiac valve disease unspecified, Heart murmur, PVC (premature ventricular contraction)  Monitor  Follow up with Cardiology as directed     8. Hypercholesteremia   Monitor  Follow up with PCP  Continue home asa, lipitor    9. Hx Elevated PSA  Monitor  Patient and son aware of Hx of elevated PSA level but state patient doesn't want further work up for it due to his age and that patient would not want any procedures done "down there"                                                          Provided Chinmay with a 5-10 year written screening schedule and personal prevention plan. Recommendations were developed using the USPSTF age appropriate recommendations. Education, counseling, and referrals were provided as needed. After Visit Summary printed and given to patient which includes a list of additional screenings\tests needed.    Follow up in about 1 year (around 11/19/2025) for Annual wellness visit.    JAMES Franklin      "

## 2024-11-19 NOTE — PATIENT INSTRUCTIONS
Counseling and Referral of Other Preventative  (Italic type indicates deductible and co-insurance are waived)    Patient Name: Chinmay Mei  Today's Date: 11/19/2024    Health Maintenance       Date Due Completion Date    TETANUS VACCINE Never done ---    Shingles Vaccine (1 of 2) Never done ---    Pneumococcal Vaccines (Age 65+) (1 of 1 - PCV) Never done ---    RSV Vaccine (Age 60+ and Pregnant patients) (1 - 1-dose 75+ series) Never done ---    Influenza Vaccine (1) 09/01/2024 11/4/2021    COVID-19 Vaccine (3 - 2024-25 season) 09/01/2024 8/14/2021    Lipid Panel 03/08/2029 3/8/2024        Orders Placed This Encounter   Procedures    Ambulatory referral/consult to Outpatient Case Management       The following information is provided to all patients.  This information is to help you find resources for any of the problems found today that may be affecting your health:                  Living healthy guide: www.[facility].[place].[facility]      Understanding Diabetes: www.diabetes.org      Eating healthy: www.cdc.gov/healthyweight      CDC home safety checklist: www.cdc.gov/steadi/patient.html      Agency on Aging: www.goea.[place].[facility]      Alcoholics anonymous (AA): www.aa.org      Physical Activity: www.[place].nih.gov/sw0gkxr      Tobacco use: www.quitwithusla.org         Counseling and Referral of Other Preventative  (Italic type indicates deductible and co-insurance are waived)    Patient Name: Chinmay Mei  Today's Date: 11/19/2024    Health Maintenance       Date Due Completion Date    TETANUS VACCINE Never done ---    Shingles Vaccine (1 of 2) Never done ---    Pneumococcal Vaccines (Age 65+) (1 of 1 - PCV) Never done ---    RSV Vaccine (Age 60+ and Pregnant patients) (1 - 1-dose 75+ series) Never done ---    Influenza Vaccine (1) 09/01/2024 11/4/2021    COVID-19 Vaccine (3 - 2024-25 season) 09/01/2024 8/14/2021    Lipid Panel 03/08/2029 3/8/2024        Orders Placed This Encounter   Procedures    Ambulatory  referral/consult to Outpatient Case Management       The following information is provided to all patients.  This information is to help you find resources for any of the problems found today that may be affecting your health:                  Living healthy guide: www.[facility].[place].[facility]      Understanding Diabetes: www.diabetes.org      Eating healthy: www.cdc.gov/healthyweight      CDC home safety checklist: www.cdc.gov/steadi/patient.html      Agency on Aging: www.goea.[place].[facility]      Alcoholics anonymous (AA): www.aa.org      Physical Activity: www.[place].nih.gov/pa4mpvx      Tobacco use: www.quitwithusla.org

## 2024-11-19 NOTE — PROGRESS NOTES
Subjective:   Patient ID:  Chinmay Mei is a 89 y.o. male who presents for follow up of No chief complaint on file.      89 yo male, routine check up  PMH mod to severe AS, HTn HLD h/o CVA in 2020. Some feet weakness. Lives alone and exwife came regularly.  No chest pain dyspnea dizziness leg swelling   Chronic heart murmur,  Echo in 2020 showed normal biv function  EKG in  NSR and PVCs HR 64 bpm  Neck CTA in 20220 showed a short segment occlusion involving the distal left vertebral artery with some calcified plaque present suggesting underlying stenosis  LDL 94 and BP controlled     Interval history  Saw him in 2022 ' echo showed EF nml mod to severe AS. Aortic valve area is 0.84 cm2; peak velocity is 2.92 m/s; mean gradient is 21 mmHg.  Walks around the house  Sleeps on recliner as habit. No PND, chest pain dyspnea faint   Some feet swelling. No leg pain weakness.   EKG reviewed by myself today reveals NSR nonspecific STT change  2020 neck CTA There is a short segment occlusion involving the distal left vertebral artery with some calcified plaque present suggesting underlying stenosis.  However there is apparent short segment of thrombus which appears acute within this distal left vertebral artery.  There is some distal reconstitution.  Resumed Lipitor 1 m ago          Past Medical History:   Diagnosis Date    Acute ischemic stroke 7/27/2020    Arthritis     Dependence on other enabling machines and devices 11/19/2024    Hyperlipidemia     Hypertension     Open angle with borderline findings, low risk, bilateral 8/3/2018    PVD (peripheral vascular disease) 11/19/2024    Urinary incontinence 11/19/2024       Past Surgical History:   Procedure Laterality Date    CATARACT EXTRACTION W/  INTRAOCULAR LENS IMPLANT Left 02/22/2017    CATARACT EXTRACTION W/  INTRAOCULAR LENS IMPLANT Right 08/15/2018       Social History     Tobacco Use    Smoking status: Former     Current packs/day: 0.00     Types: Cigarettes      Quit date: 2006     Years since quittin.3    Smokeless tobacco: Never   Substance Use Topics    Alcohol use: No    Drug use: No       Family History   Problem Relation Name Age of Onset    Heart disease Mother      Hypertension Mother      Prostate cancer Father      Diabetes Father      Diabetes Sister      Heart disease Sister      Glaucoma Sister      Hypertension Sister      Ulcers Sister      Heart disease Brother      Diabetes Brother      Hypertension Brother      Kidney disease Daughter          dialysis    Stomach cancer Daughter      Diabetes Son      Hypertension Son      Stroke Brother           ROS    Objective:   Physical Exam  HENT:      Head: Normocephalic.   Eyes:      Pupils: Pupils are equal, round, and reactive to light.   Neck:      Thyroid: No thyromegaly.      Vascular: Normal carotid pulses. No carotid bruit or JVD.   Cardiovascular:      Rate and Rhythm: Normal rate and regular rhythm. No extrasystoles are present.     Chest Wall: PMI is not displaced.      Pulses: Normal pulses.           Carotid pulses are 2+ on the right side and 2+ on the left side.     Heart sounds: Murmur heard.      Harsh midsystolic murmur is present with a grade of 4/6 at the upper right sternal border radiating to the neck.      No gallop. No S3 sounds.   Pulmonary:      Effort: No respiratory distress.      Breath sounds: Normal breath sounds. No stridor.   Abdominal:      General: Bowel sounds are normal.      Palpations: Abdomen is soft.      Tenderness: There is no abdominal tenderness. There is no rebound.   Musculoskeletal:         General: Swelling present.   Skin:     Findings: No rash.   Neurological:      Mental Status: He is alert and oriented to person, place, and time.   Psychiatric:         Behavior: Behavior normal.         Lab Results   Component Value Date    CHOL 207 (H) 2024    CHOL 211 (H) 2022    CHOL 152 2021     Lab Results   Component Value Date    HDL 40  03/08/2024    HDL 40 11/08/2022    HDL 39 (L) 11/04/2021     Lab Results   Component Value Date    LDLCALC 144.6 03/08/2024    LDLCALC 145.6 11/08/2022    LDLCALC 94.2 11/04/2021     Lab Results   Component Value Date    TRIG 112 03/08/2024    TRIG 127 11/08/2022    TRIG 94 11/04/2021     Lab Results   Component Value Date    CHOLHDL 19.3 (L) 03/08/2024    CHOLHDL 19.0 (L) 11/08/2022    CHOLHDL 25.7 11/04/2021       Chemistry        Component Value Date/Time     09/09/2024 1233    K 5.2 (H) 09/09/2024 1233     09/09/2024 1233    CO2 25 09/09/2024 1233    BUN 19 09/09/2024 1233    CREATININE 0.9 09/09/2024 1233    GLU 88 09/09/2024 1233        Component Value Date/Time    CALCIUM 9.4 09/09/2024 1233    ALKPHOS 96 09/09/2024 1233    AST 22 09/09/2024 1233    ALT 10 09/09/2024 1233    BILITOT 1.0 09/09/2024 1233    ESTGFRAFRICA >60.0 11/04/2021 1552    EGFRNONAA >60.0 11/04/2021 1552          Lab Results   Component Value Date    HGBA1C 5.5 07/27/2020     Lab Results   Component Value Date    TSH 0.454 07/27/2020     Lab Results   Component Value Date    INR 1.0 07/28/2020    INR 1.0 07/27/2020     Lab Results   Component Value Date    WBC 5.69 09/09/2024    HGB 13.8 (L) 09/09/2024    HCT 43.9 09/09/2024    MCV 87 09/09/2024     09/09/2024     BMP  Sodium   Date Value Ref Range Status   09/09/2024 141 136 - 145 mmol/L Final     Potassium   Date Value Ref Range Status   09/09/2024 5.2 (H) 3.5 - 5.1 mmol/L Final     Comment:     *Result may be interfered by visible hemolysis     Chloride   Date Value Ref Range Status   09/09/2024 106 95 - 110 mmol/L Final     CO2   Date Value Ref Range Status   09/09/2024 25 23 - 29 mmol/L Final     BUN   Date Value Ref Range Status   09/09/2024 19 8 - 23 mg/dL Final     Creatinine   Date Value Ref Range Status   09/09/2024 0.9 0.5 - 1.4 mg/dL Final     Calcium   Date Value Ref Range Status   09/09/2024 9.4 8.7 - 10.5 mg/dL Final     Anion Gap   Date Value Ref Range  Status   09/09/2024 10 8 - 16 mmol/L Final     eGFR if    Date Value Ref Range Status   11/04/2021 >60.0 >60 mL/min/1.73 m^2 Final     eGFR if non    Date Value Ref Range Status   11/04/2021 >60.0 >60 mL/min/1.73 m^2 Final     Comment:     Calculation used to obtain the estimated glomerular filtration  rate (eGFR) is the CKD-EPI equation.        BNP  @LABRCNTIP(BNP,BNPTRIAGEBLO)@  @LABRCNTIP(troponini)@  CrCl cannot be calculated (Patient's most recent lab result is older than the maximum 7 days allowed.).  No results found in the last 24 hours.  No results found in the last 24 hours.  No results found in the last 24 hours.    Assessment:      1. Aortic valve stenosis, etiology of cardiac valve disease unspecified    2. Cerebrovascular accident (CVA) due to thrombosis of left vertebral artery    3. Aortic atherosclerosis    4. Essential hypertension    5. Hypercholesteremia    6. PVC (premature ventricular contraction)    7. PVD (peripheral vascular disease)    8. Localized edema    9. Aortic calcification    10. Other specified disorders of arteries and arterioles       HLD  H/o CVA  Mod to severe AS  PVD leg swelling    Plan:   Echo for AS eval  LE venous US r/o DVT  Add HCTZ 25 mg daily for HTn and PVD  Decrease amlodipine to 5 mg daily  Continue Lsoartan 100 mg daily  Carotid US screen  BMP in 2 weeks   Continue asa Lipitor   Rtc in 3m

## 2024-11-20 LAB
OHS QRS DURATION: 78 MS
OHS QTC CALCULATION: 400 MS

## 2024-12-04 ENCOUNTER — HOSPITAL ENCOUNTER (OUTPATIENT)
Dept: CARDIOLOGY | Facility: HOSPITAL | Age: 89
Discharge: HOME | End: 2024-12-04
Attending: INTERNAL MEDICINE
Payer: MEDICARE

## 2024-12-04 ENCOUNTER — PATIENT MESSAGE (OUTPATIENT)
Dept: CARDIOLOGY | Facility: CLINIC | Age: 89
End: 2024-12-04
Payer: MEDICARE

## 2024-12-04 VITALS
WEIGHT: 201 LBS | BODY MASS INDEX: 25.8 KG/M2 | DIASTOLIC BLOOD PRESSURE: 98 MMHG | SYSTOLIC BLOOD PRESSURE: 140 MMHG | HEIGHT: 74 IN

## 2024-12-04 VITALS
SYSTOLIC BLOOD PRESSURE: 166 MMHG | BODY MASS INDEX: 25.8 KG/M2 | WEIGHT: 201 LBS | HEIGHT: 74 IN | SYSTOLIC BLOOD PRESSURE: 166 MMHG | DIASTOLIC BLOOD PRESSURE: 84 MMHG | HEIGHT: 74 IN | BODY MASS INDEX: 25.8 KG/M2 | WEIGHT: 201 LBS | DIASTOLIC BLOOD PRESSURE: 84 MMHG

## 2024-12-04 DIAGNOSIS — I73.9 PVD (PERIPHERAL VASCULAR DISEASE): ICD-10-CM

## 2024-12-04 DIAGNOSIS — I77.89 OTHER SPECIFIED DISORDERS OF ARTERIES AND ARTERIOLES: ICD-10-CM

## 2024-12-04 DIAGNOSIS — I35.0 AORTIC VALVE STENOSIS, ETIOLOGY OF CARDIAC VALVE DISEASE UNSPECIFIED: ICD-10-CM

## 2024-12-04 DIAGNOSIS — R60.0 LOCALIZED EDEMA: ICD-10-CM

## 2024-12-04 DIAGNOSIS — I70.0 AORTIC CALCIFICATION: ICD-10-CM

## 2024-12-04 LAB
ASCENDING AORTA: 3.18 CM
AV INDEX (PROSTH): 0.32
AV MEAN GRADIENT: 18.4 MMHG
AV PEAK GRADIENT: 31.4 MMHG
AV VALVE AREA BY VELOCITY RATIO: 1 CM²
AV VALVE AREA: 1 CM²
AV VELOCITY RATIO: 0.32
BSA FOR ECHO PROCEDURE: 2.18 M2
CV ECHO LV RWT: 0.51 CM
DOP CALC AO PEAK VEL: 2.8 M/S
DOP CALC AO VTI: 75.7 CM
DOP CALC LVOT AREA: 3.1 CM2
DOP CALC LVOT DIAMETER: 2 CM
DOP CALC LVOT PEAK VEL: 0.9 M/S
DOP CALC LVOT STROKE VOLUME: 77.2 CM3
DOP CALC RVOT PEAK VEL: 0.56 M/S
DOP CALC RVOT VTI: 14.6 CM
DOP CALCLVOT PEAK VEL VTI: 24.6 CM
E WAVE DECELERATION TIME: 180.11 MSEC
E/A RATIO: 1.4
E/E' RATIO: 15.57 M/S
ECHO LV POSTERIOR WALL: 1.1 CM (ref 0.6–1.1)
FRACTIONAL SHORTENING: 25.6 % (ref 28–44)
INTERVENTRICULAR SEPTUM: 1.2 CM (ref 0.6–1.1)
IVC DIAMETER: 1.94 CM
IVRT: 74.22 MSEC
LA MAJOR: 6.75 CM
LA MINOR: 7.04 CM
LA WIDTH: 4.7 CM
LEFT ARM DIASTOLIC BLOOD PRESSURE: 98 MMHG
LEFT ARM SYSTOLIC BLOOD PRESSURE: 174 MMHG
LEFT ATRIUM AREA SYSTOLIC (APICAL 2 CHAMBER): 33.94 CM2
LEFT ATRIUM AREA SYSTOLIC (APICAL 4 CHAMBER): 26.13 CM2
LEFT ATRIUM SIZE: 3.39 CM
LEFT ATRIUM VOLUME INDEX MOD: 48.6 ML/M2
LEFT ATRIUM VOLUME INDEX: 42.8 ML/M2
LEFT ATRIUM VOLUME MOD: 105.85 ML
LEFT ATRIUM VOLUME: 93.34 CM3
LEFT CBA DIAS: 12 CM/S
LEFT CBA SYS: 64 CM/S
LEFT CCA DIST DIAS: 12 CM/S
LEFT CCA DIST SYS: 75 CM/S
LEFT CCA MID DIAS: 16 CM/S
LEFT CCA MID SYS: 82 CM/S
LEFT CCA PROX DIAS: 19 CM/S
LEFT CCA PROX SYS: 80 CM/S
LEFT ECA DIAS: 10 CM/S
LEFT ECA SYS: 77 CM/S
LEFT ICA DIST DIAS: 7 CM/S
LEFT ICA DIST SYS: 53 CM/S
LEFT ICA MID DIAS: 25 CM/S
LEFT ICA MID SYS: 111 CM/S
LEFT ICA PROX DIAS: 18 CM/S
LEFT ICA PROX SYS: 85 CM/S
LEFT INTERNAL DIMENSION IN SYSTOLE: 3.2 CM (ref 2.1–4)
LEFT VENTRICLE DIASTOLIC VOLUME INDEX: 37.62 ML/M2
LEFT VENTRICLE DIASTOLIC VOLUME: 82.01 ML
LEFT VENTRICLE END SYSTOLIC VOLUME APICAL 2 CHAMBER: 131.84 ML
LEFT VENTRICLE END SYSTOLIC VOLUME APICAL 4 CHAMBER: 82.49 ML
LEFT VENTRICLE MASS INDEX: 79.7 G/M2
LEFT VENTRICLE SYSTOLIC VOLUME INDEX: 18.4 ML/M2
LEFT VENTRICLE SYSTOLIC VOLUME: 40.1 ML
LEFT VENTRICULAR INTERNAL DIMENSION IN DIASTOLE: 4.3 CM (ref 3.5–6)
LEFT VENTRICULAR MASS: 173.6 G
LEFT VERTEBRAL DIAS: 0 CM/S
LEFT VERTEBRAL SYS: 41 CM/S
LV LATERAL E/E' RATIO: 13.63 M/S
LV SEPTAL E/E' RATIO: 18.17 M/S
LVED V (TEICH): 82.01 ML
LVES V (TEICH): 40.1 ML
LVOT MG: 1.9 MMHG
LVOT MV: 0.64 CM/S
MV PEAK A VEL: 0.78 M/S
MV PEAK E VEL: 1.09 M/S
MV STENOSIS PRESSURE HALF TIME: 52.23 MS
MV VALVE AREA P 1/2 METHOD: 4.21 CM2
OHS CV CAROTID RIGHT ICA EDV HIGHEST: 21
OHS CV CAROTID ULTRASOUND LEFT ICA/CCA RATIO: 1.48
OHS CV CAROTID ULTRASOUND RIGHT ICA/CCA RATIO: 1.29
OHS CV PV CAROTID LEFT HIGHEST CCA: 82
OHS CV PV CAROTID LEFT HIGHEST ICA: 111
OHS CV PV CAROTID RIGHT HIGHEST CCA: 93
OHS CV PV CAROTID RIGHT HIGHEST ICA: 90
OHS CV US CAROTID LEFT HIGHEST EDV: 25
PISA MRMAX VEL: 5.02 M/S
PISA TR MAX VEL: 2.86 M/S
PV MEAN GRADIENT: 1 MMHG
PV MV: 0.68 M/S
PV PEAK GRADIENT: 3 MMHG
PV PEAK VELOCITY: 0.92 M/S
RA MAJOR: 5.86 CM
RA PRESSURE ESTIMATED: 3 MMHG
RA WIDTH: 5.18 CM
RIGHT ARM DIASTOLIC BLOOD PRESSURE: 84 MMHG
RIGHT ARM SYSTOLIC BLOOD PRESSURE: 166 MMHG
RIGHT CBA DIAS: 14 CM/S
RIGHT CBA SYS: 56 CM/S
RIGHT CCA DIST DIAS: 13 CM/S
RIGHT CCA DIST SYS: 70 CM/S
RIGHT CCA MID DIAS: 13 CM/S
RIGHT CCA MID SYS: 68 CM/S
RIGHT CCA PROX DIAS: 11 CM/S
RIGHT CCA PROX SYS: 93 CM/S
RIGHT ECA DIAS: 9 CM/S
RIGHT ECA SYS: 84 CM/S
RIGHT ICA DIST DIAS: 21 CM/S
RIGHT ICA DIST SYS: 76 CM/S
RIGHT ICA MID DIAS: 18 CM/S
RIGHT ICA MID SYS: 90 CM/S
RIGHT ICA PROX DIAS: 14 CM/S
RIGHT ICA PROX SYS: 74 CM/S
RIGHT VENTRICULAR END-DIASTOLIC DIMENSION: 4.49 CM
RIGHT VERTEBRAL DIAS: 7 CM/S
RIGHT VERTEBRAL SYS: 48 CM/S
RV TB RVSP: 6 MMHG
RV TISSUE DOPPLER FREE WALL SYSTOLIC VELOCITY 1 (APICAL 4 CHAMBER VIEW): 16.76 CM/S
SINUS: 3.55 CM
STJ: 2.44 CM
TDI LATERAL: 0.08 M/S
TDI SEPTAL: 0.06 M/S
TDI: 0.07 M/S
TR MAX PG: 33 MMHG
TRICUSPID ANNULAR PLANE SYSTOLIC EXCURSION: 2.54 CM
TV REST PULMONARY ARTERY PRESSURE: 36 MMHG
Z-SCORE OF LEFT VENTRICULAR DIMENSION IN END DIASTOLE: -5.28
Z-SCORE OF LEFT VENTRICULAR DIMENSION IN END SYSTOLE: -2.58

## 2024-12-04 PROCEDURE — 93880 EXTRACRANIAL BILAT STUDY: CPT | Mod: HCNC

## 2024-12-04 PROCEDURE — 93970 EXTREMITY STUDY: CPT | Mod: HCNC

## 2024-12-04 PROCEDURE — 93306 TTE W/DOPPLER COMPLETE: CPT | Mod: 26,HCNC,, | Performed by: STUDENT IN AN ORGANIZED HEALTH CARE EDUCATION/TRAINING PROGRAM

## 2024-12-04 PROCEDURE — 93306 TTE W/DOPPLER COMPLETE: CPT | Mod: HCNC

## 2024-12-04 RX ORDER — AMLODIPINE BESYLATE 10 MG/1
10 TABLET ORAL DAILY
Qty: 90 TABLET | Refills: 2 | Status: SHIPPED | OUTPATIENT
Start: 2024-12-04

## 2024-12-09 ENCOUNTER — TELEPHONE (OUTPATIENT)
Dept: CARDIOLOGY | Facility: CLINIC | Age: 89
End: 2024-12-09
Payer: MEDICARE

## 2024-12-09 NOTE — TELEPHONE ENCOUNTER
Spoke with son to advise per Dr. Hollingsworth: LE marcelo US indicated no DVT. Son voiced understanding.    ----- Message from Alexey Hollingsworth MD sent at 12/8/2024  8:16 PM CST -----  LE venus US showed no DVT.

## 2024-12-09 NOTE — TELEPHONE ENCOUNTER
Contacted PT and reviewed results and recommendations -The lab showed K and Cr levels normal   Continue current Rx.   F/U as scheduled   The Echo showed normal function and moderate AS.  PT stated     PT stated verbal understanding    .           ----- Message from Alexey Hollingsworth MD sent at 12/8/2024  8:16 PM CST -----  The Echo showed normal function and moderate AS.  Continue current Rx.   F/U as scheduled

## 2024-12-09 NOTE — TELEPHONE ENCOUNTER
Spoke with pt's son in regards to test results.           ----- Message from Alexey Hollingsworth MD sent at 12/8/2024  8:44 PM CST -----  Carotid artery US showed mild Dz

## 2024-12-11 ENCOUNTER — PATIENT OUTREACH (OUTPATIENT)
Dept: ADMINISTRATIVE | Facility: OTHER | Age: 89
End: 2024-12-11
Payer: MEDICARE

## 2024-12-11 NOTE — PROGRESS NOTES
CHW - Initial Contact    This Community Health Worker completed OR updated the Social Determinant of Health questionnaire with caregiver, son, Chinmay Novoa,  via telephone today.    Pt identified barriers of most importance are: Meals on Wheels   Support and Services:   Spoke with the patient's son, Chinmay and discussed patient is well cared for and still pretty independent. Son stated patients' SNAP , but he will get it renewed. Patient is a  and informed son to review [facility] services to maximize the income and benefits for Chinmay. I provided him with [facility] on Aging number [phone] for Meals on Wheels, since patient does not leave home much.  Other information discussed the patient needs / wants help with: n/a   Follow up required:   No future outreach task assigned

## 2025-01-06 ENCOUNTER — OFFICE VISIT (OUTPATIENT)
Dept: PODIATRY | Facility: CLINIC | Age: OVER 89
End: 2025-01-06
Payer: MEDICARE

## 2025-01-06 VITALS — HEIGHT: 74 IN | WEIGHT: 201.06 LBS | BODY MASS INDEX: 25.8 KG/M2

## 2025-01-06 DIAGNOSIS — R60.0 BILATERAL LOWER EXTREMITY EDEMA: Primary | ICD-10-CM

## 2025-01-06 DIAGNOSIS — L60.3 ONYCHODYSTROPHY: ICD-10-CM

## 2025-01-06 DIAGNOSIS — R60.9 3+ PITTING EDEMA: ICD-10-CM

## 2025-01-06 PROCEDURE — 99999 PR PBB SHADOW E&M-EST. PATIENT-LVL III: CPT | Mod: PBBFAC,HCNC,, | Performed by: PODIATRIST

## 2025-01-06 PROCEDURE — 1101F PT FALLS ASSESS-DOCD LE1/YR: CPT | Mod: HCNC,CPTII,S$GLB, | Performed by: PODIATRIST

## 2025-01-06 PROCEDURE — 3288F FALL RISK ASSESSMENT DOCD: CPT | Mod: HCNC,CPTII,S$GLB, | Performed by: PODIATRIST

## 2025-01-06 PROCEDURE — 1159F MED LIST DOCD IN RCRD: CPT | Mod: HCNC,CPTII,S$GLB, | Performed by: PODIATRIST

## 2025-01-06 PROCEDURE — 1160F RVW MEDS BY RX/DR IN RCRD: CPT | Mod: HCNC,CPTII,S$GLB, | Performed by: PODIATRIST

## 2025-01-06 PROCEDURE — 1126F AMNT PAIN NOTED NONE PRSNT: CPT | Mod: HCNC,CPTII,S$GLB, | Performed by: PODIATRIST

## 2025-01-06 PROCEDURE — 99213 OFFICE O/P EST LOW 20 MIN: CPT | Mod: HCNC,S$GLB,, | Performed by: PODIATRIST

## 2025-01-06 NOTE — PROGRESS NOTES
Subjective:       Patient ID: Chinmay Mei is a 89 y.o. male.    Chief Complaint: Foot Swelling (C/o swelling in his foot and ingrown toenail of the right great toe, pt rates pain 0/10, pt is non-diabetic)    HPI: Chinmay Mei presents to the office today with his son and grandson present.  Complains of possible ingrown toenail to the right great toe in conjunction with increased swelling to the bilateral lower extremities.  Family states that he does have compression socks at home but does not wear them.  Reports that the patient attempted to remove the ingrown pieces the nails himself at home.  States no pain currently.  No active bleeding. Patient's Primary Care Provider is Luis E Leach MD.     Review of patient's allergies indicates:   Allergen Reactions    Aspirin      bleeding    Hydrochlorothiazide      confusion       Past Medical History:   Diagnosis Date    Acute ischemic stroke 2020    Arthritis     Dependence on other enabling machines and devices 2024    Hyperlipidemia     Hypertension     Open angle with borderline findings, low risk, bilateral 8/3/2018    PVD (peripheral vascular disease) 2024    Urinary incontinence 2024       Family History   Problem Relation Name Age of Onset    Heart disease Mother      Hypertension Mother      Prostate cancer Father      Diabetes Father      Diabetes Sister      Heart disease Sister      Glaucoma Sister      Hypertension Sister      Ulcers Sister      Heart disease Brother      Diabetes Brother      Hypertension Brother      Kidney disease Daughter          dialysis    Stomach cancer Daughter      Diabetes Son      Hypertension Son      Stroke Brother         Social History     Socioeconomic History    Marital status:    Tobacco Use    Smoking status: Former     Current packs/day: 0.00     Types: Cigarettes     Quit date: 2006     Years since quittin.4    Smokeless tobacco: Never   Substance and Sexual  "Activity    Alcohol use: No    Drug use: No     Social Drivers of Health     Financial Resource Strain: Medium Risk (12/11/2024)    Overall Financial Resource Strain (CARDIA)     Difficulty of Paying Living Expenses: Somewhat hard   Food Insecurity: Food Insecurity Present (12/11/2024)    Hunger Vital Sign     Worried About Running Out of Food in the Last Year: Sometimes true     Ran Out of Food in the Last Year: Never true   Transportation Needs: No Transportation Needs (12/11/2024)    PRAPARE - Transportation     Lack of Transportation (Medical): No     Lack of Transportation (Non-Medical): No   Physical Activity: Inactive (12/11/2024)    Exercise Vital Sign     Days of Exercise per Week: 0 days     Minutes of Exercise per Session: 0 min   Stress: No Stress Concern Present (12/11/2024)    [employer] [place] [employer] - Occupational Stress Questionnaire     Feeling of Stress : Not at all   Housing Stability: Low Risk  (12/11/2024)    Housing Stability Vital Sign     Unable to Pay for Housing in the Last Year: No     Homeless in the Last Year: No       Past Surgical History:   Procedure Laterality Date    CATARACT EXTRACTION W/  INTRAOCULAR LENS IMPLANT Left 02/22/2017    CATARACT EXTRACTION W/  INTRAOCULAR LENS IMPLANT Right 08/15/2018       Review of Systems      Objective:   Ht 6' 2" (1.88 m)   Wt 91.2 kg (201 lb 1 oz)   BMI 25.81 kg/m²     Physical Exam  LOWER EXTREMITY PHYSICAL EXAMINATION    NEUROLOGY: Sensation to light touch is intact. Proprioception is intact.  Vibratory sensation intact    VASCULAR:  The right dorsalis pedis pulse 2/4 and the right posterior tibial pulse 2/4.  The left dorsalis pedis pulse 2/4 and posterior tibial pulse on the left is 2/4.  Capillary refill is intact.  Pedal hair growth intact    DERMATOLOGY:  Skin is supple, moist, intact.  There is no signs of callusing, ulcerations, other lesions identified to the dorsal or plantar aspect of the right or left foot.  The " R1, 2, 5 and left L1,2, 5 are thickened, discolored dystrophic.  There is subungual debris.  Nail plates have area of dark discoloration.  The remaining nails 3-4 on the right foot and the left foot are elongated but of normal color, thickness, and texture.   There is no signs of ingrowing into the medial or lateral borders.  There is no evidence of wounds or skin breakdown.  Increase in swelling, +3 pitting edema to the bilateral lower extremities.    ORTHOPEDIC: Manual Muscle Testing is 5/5 in all planes on the  Right left , without pains, with and without resistance. Gait pattern is slightly antalgic.    Assessment:     1. Bilateral lower extremity edema    2. 3+ pitting edema    3. Onychodystrophy          Plan:     Bilateral lower extremity edema    3+ pitting edema    Onychodystrophy        Thorough discussion is had with the patient today, concerning the diagnosis, its etiology, and the treatment algorithm at present.    Dystrophic nail plates, as outlined above (R#1,2,5  ; L#1,2,5 ), are sharply debrided with double action nail nipper, and/or with the assistance of a mechanical rotary david, with removal of all offending nail and nail border(s), for reduction of pains. Nails are reduced in terms of length, width and girth with removal of subungual debris to facilitate pain free weight bearing and ambulation. The elongated nails as outlined in the objective portion of this note, were trimmed to appropriate length, with a double action nail nipper, for alleviation/reduction of pains as well.    Did discuss significant swelling in pooling of the lower extremities.  I do recommend patient utilize elevation techniques to elevate the ankle above the level the hips when lying or sitting down.  We discussed that with elevation this could help decrease swelling which is occurring to the lower extremities.  Would also recommend patient to consider/use bilateral lower leg compression which is gradient in nature.  Would  recommend initiating with 20-30 mmHg of graduated compression and advancing thereafter.  Would recommend compliance as this can lead to significant benefits in regards of swelling and fluid accumulation which is occurring in the lower extremities.    Future Appointments   Date Time Provider Department Center   2/20/2025  3:40 PM Alexey Hollingsworth MD [facility]   3/10/2025 11:30 AM Luis E Leach MD [facility]

## 2025-01-25 NOTE — TELEPHONE ENCOUNTER
Care Due:                  Date            Visit Type   Department     Provider  --------------------------------------------------------------------------------                                EP -                              PRIMARY      [facility]  Last Visit: 09-      CARE ([facility])   CHIQUI Leach                              EP -                              PRIMARY      [facility]  Next Visit: 03-      CARE ([facility])   CHIQUI Leach                                                            Last  Test          Frequency    Reason                     Performed    Due Date  --------------------------------------------------------------------------------    Lipid Panel.  12 months..  atorvastatin.............  03- 03-    [facility] Embedded Care Due Messages. Reference number: [id number].   1/25/2025 1:28:03 AM CST

## 2025-01-25 NOTE — TELEPHONE ENCOUNTER
Refill Routing Note   Medication(s) are not appropriate for processing by [facility] for the following reason(s):        Required vitals abnormal    [facility] action(s):  Defer     Requires labs : Yes      Medication Therapy Plan: 12/04/24 (!) 140/98      Appointments  past 12m or future 3m with PCP    Date Provider   Last Visit   9/9/2024 Luis E Leach MD   Next Visit   3/10/2025 Luis E Leach MD   ED visits in past 90 days: 0        Note composed:11:46 AM 01/25/2025

## 2025-01-26 RX ORDER — LOSARTAN POTASSIUM 100 MG/1
TABLET ORAL
Qty: 90 TABLET | Refills: 3 | Status: SHIPPED | OUTPATIENT
Start: 2025-01-26

## 2025-02-20 ENCOUNTER — OFFICE VISIT (OUTPATIENT)
Dept: CARDIOLOGY | Facility: CLINIC | Age: OVER 89
End: 2025-02-20
Payer: MEDICARE

## 2025-02-20 VITALS
OXYGEN SATURATION: 95 % | HEART RATE: 72 BPM | BODY MASS INDEX: 26.08 KG/M2 | HEIGHT: 74 IN | WEIGHT: 203.25 LBS | SYSTOLIC BLOOD PRESSURE: 124 MMHG | DIASTOLIC BLOOD PRESSURE: 72 MMHG

## 2025-02-20 DIAGNOSIS — I49.3 PVC (PREMATURE VENTRICULAR CONTRACTION): ICD-10-CM

## 2025-02-20 DIAGNOSIS — E78.00 HYPERCHOLESTEREMIA: Chronic | ICD-10-CM

## 2025-02-20 DIAGNOSIS — I27.20 PULMONARY HYPERTENSION: ICD-10-CM

## 2025-02-20 DIAGNOSIS — I10 ESSENTIAL HYPERTENSION: Chronic | ICD-10-CM

## 2025-02-20 DIAGNOSIS — I35.0 NONRHEUMATIC AORTIC VALVE STENOSIS: ICD-10-CM

## 2025-02-20 DIAGNOSIS — I73.9 PVD (PERIPHERAL VASCULAR DISEASE): ICD-10-CM

## 2025-02-20 DIAGNOSIS — I70.0 AORTIC ATHEROSCLEROSIS: Primary | ICD-10-CM

## 2025-02-20 DIAGNOSIS — I63.012 CEREBROVASCULAR ACCIDENT (CVA) DUE TO THROMBOSIS OF LEFT VERTEBRAL ARTERY: ICD-10-CM

## 2025-02-20 PROCEDURE — 1160F RVW MEDS BY RX/DR IN RCRD: CPT | Mod: HCNC,CPTII,S$GLB, | Performed by: INTERNAL MEDICINE

## 2025-02-20 PROCEDURE — 3288F FALL RISK ASSESSMENT DOCD: CPT | Mod: HCNC,CPTII,S$GLB, | Performed by: INTERNAL MEDICINE

## 2025-02-20 PROCEDURE — 1159F MED LIST DOCD IN RCRD: CPT | Mod: HCNC,CPTII,S$GLB, | Performed by: INTERNAL MEDICINE

## 2025-02-20 PROCEDURE — 1101F PT FALLS ASSESS-DOCD LE1/YR: CPT | Mod: HCNC,CPTII,S$GLB, | Performed by: INTERNAL MEDICINE

## 2025-02-20 PROCEDURE — 99999 PR PBB SHADOW E&M-EST. PATIENT-LVL III: CPT | Mod: PBBFAC,HCNC,, | Performed by: INTERNAL MEDICINE

## 2025-02-20 PROCEDURE — 1126F AMNT PAIN NOTED NONE PRSNT: CPT | Mod: HCNC,CPTII,S$GLB, | Performed by: INTERNAL MEDICINE

## 2025-02-20 PROCEDURE — 99214 OFFICE O/P EST MOD 30 MIN: CPT | Mod: HCNC,S$GLB,, | Performed by: INTERNAL MEDICINE

## 2025-02-20 NOTE — PROGRESS NOTES
Subjective:   Patient ID:  Chinmay Mei is a 89 y.o. male who presents for follow up of No chief complaint on file.      89 yo male, routine check up  PMH mod to severe AS, HTn HLD h/o CVA in 2020. Some feet weakness. Lives alone and exwife came regularly.  No chest pain dyspnea dizziness leg swelling   Chronic heart murmur,  Echo in 2020 showed normal biv function  EKG in  NSR and PVCs HR 64 bpm  Neck CTA in 20220 showed a short segment occlusion involving the distal left vertebral artery with some calcified plaque present suggesting underlying stenosis  LDL 94 and BP controlled     11/24 visit  Saw him in 2022 ' echo showed EF nml mod to severe AS. Aortic valve area is 0.84 cm2; peak velocity is 2.92 m/s; mean gradient is 21 mmHg.  Walks around the house  Sleeps on recliner as habit. No PND, chest pain dyspnea faint   Some feet swelling. No leg pain weakness.   EKG reviewed by myself today reveals NSR nonspecific STT change  2020 neck CTA There is a short segment occlusion involving the distal left vertebral artery with some calcified plaque present suggesting underlying stenosis.  However there is apparent short segment of thrombus which appears acute within this distal left vertebral artery.  There is some distal reconstitution.  Resumed Lipitor 1 m ago    Interval history  12/24 echo EF nml, moderate stenosis. Aortic valve area by VTI is 1.0 cm². Aortic valve peak velocity is 2.8 m/s. Mean gradient is 18.4 mmHg. The dimensionless index is 0.32. There is no significant regurgitation.  Carotid artery US showed mild Dz  LE venous US showed no DVt  Walks slow  No orthopnea PND faint dizziness chest pain  Some feet swelling. Improve after elevated   Hold HCTZ due to cramp and confusion   and now med compliance by family help lives with nephew now          Past Medical History:   Diagnosis Date    Acute ischemic stroke 7/27/2020    Arthritis     Dependence on other enabling machines and devices  11/19/2024    Hyperlipidemia     Hypertension     Open angle with borderline findings, low risk, bilateral 8/3/2018    PVD (peripheral vascular disease) 11/19/2024    Urinary incontinence 11/19/2024       Past Surgical History:   Procedure Laterality Date    CATARACT EXTRACTION W/  INTRAOCULAR LENS IMPLANT Left 02/22/2017    CATARACT EXTRACTION W/  INTRAOCULAR LENS IMPLANT Right 08/15/2018       Social History[1]    Family History   Problem Relation Name Age of Onset    Heart disease Mother      Hypertension Mother      Prostate cancer Father      Diabetes Father      Diabetes Sister      Heart disease Sister      Glaucoma Sister      Hypertension Sister      Ulcers Sister      Heart disease Brother      Diabetes Brother      Hypertension Brother      Kidney disease Daughter          dialysis    Stomach cancer Daughter      Diabetes Son      Hypertension Son      Stroke Brother           ROS    Objective:   Physical Exam  HENT:      Head: Normocephalic.   Eyes:      Pupils: Pupils are equal, round, and reactive to light.   Neck:      Thyroid: No thyromegaly.      Vascular: Normal carotid pulses. No carotid bruit or JVD.   Cardiovascular:      Rate and Rhythm: Normal rate and regular rhythm. No extrasystoles are present.     Chest Wall: PMI is not displaced.      Pulses: Normal pulses.           Carotid pulses are 2+ on the right side and 2+ on the left side.     Heart sounds: Murmur heard.      Harsh midsystolic murmur is present with a grade of 4/6 at the upper right sternal border radiating to the neck.      No gallop. No S3 sounds.   Pulmonary:      Effort: No respiratory distress.      Breath sounds: Normal breath sounds. No stridor.   Abdominal:      General: Bowel sounds are normal.      Palpations: Abdomen is soft.      Tenderness: There is no abdominal tenderness. There is no rebound.   Musculoskeletal:         General: Swelling present.   Skin:     Findings: No rash.   Neurological:      Mental Status: He  is alert and oriented to person, place, and time.   Psychiatric:         Behavior: Behavior normal.         Lab Results   Component Value Date    CHOL 207 (H) 03/08/2024    CHOL 211 (H) 11/08/2022    CHOL 152 11/04/2021     Lab Results   Component Value Date    HDL 40 03/08/2024    HDL 40 11/08/2022    HDL 39 (L) 11/04/2021     Lab Results   Component Value Date    LDLCALC 144.6 03/08/2024    LDLCALC 145.6 11/08/2022    LDLCALC 94.2 11/04/2021     Lab Results   Component Value Date    TRIG 112 03/08/2024    TRIG 127 11/08/2022    TRIG 94 11/04/2021     Lab Results   Component Value Date    CHOLHDL 19.3 (L) 03/08/2024    CHOLHDL 19.0 (L) 11/08/2022    CHOLHDL 25.7 11/04/2021       Chemistry        Component Value Date/Time     12/04/2024 1230    K 4.3 12/04/2024 1230     12/04/2024 1230    CO2 28 12/04/2024 1230    BUN 16 12/04/2024 1230    CREATININE 0.8 12/04/2024 1230    GLU 68 (L) 12/04/2024 1230        Component Value Date/Time    CALCIUM 9.7 12/04/2024 1230    ALKPHOS 96 09/09/2024 1233    AST 22 09/09/2024 1233    ALT 10 09/09/2024 1233    BILITOT 1.0 09/09/2024 1233    ESTGFRAFRICA >60.0 11/04/2021 1552    EGFRNONAA >60.0 11/04/2021 1552          Lab Results   Component Value Date    HGBA1C 5.5 07/27/2020     Lab Results   Component Value Date    TSH 0.454 07/27/2020     Lab Results   Component Value Date    INR 1.0 07/28/2020    INR 1.0 07/27/2020     Lab Results   Component Value Date    WBC 5.69 09/09/2024    HGB 13.8 (L) 09/09/2024    HCT 43.9 09/09/2024    MCV 87 09/09/2024     09/09/2024     BMP  Sodium   Date Value Ref Range Status   12/04/2024 142 136 - 145 mmol/L Final     Potassium   Date Value Ref Range Status   12/04/2024 4.3 3.5 - 5.1 mmol/L Final     Chloride   Date Value Ref Range Status   12/04/2024 104 95 - 110 mmol/L Final     CO2   Date Value Ref Range Status   12/04/2024 28 23 - 29 mmol/L Final     BUN   Date Value Ref Range Status   12/04/2024 16 8 - 23 mg/dL Final      Creatinine   Date Value Ref Range Status   2024 0.8 0.5 - 1.4 mg/dL Final     Calcium   Date Value Ref Range Status   2024 9.7 8.7 - 10.5 mg/dL Final     Anion Gap   Date Value Ref Range Status   2024 10 8 - 16 mmol/L Final     eGFR if    Date Value Ref Range Status   2021 >60.0 >60 mL/min/1.73 m^2 Final     eGFR if non    Date Value Ref Range Status   2021 >60.0 >60 mL/min/1.73 m^2 Final     Comment:     Calculation used to obtain the estimated glomerular filtration  rate (eGFR) is the CKD-EPI equation.        BNP  @LABRCNTIP(BNP,BNPTRIAGEBLO)@  @LABRCNTIP(troponini)@  CrCl cannot be calculated (Patient's most recent lab result is older than the maximum 7 days allowed.).  No results found in the last 24 hours.  No results found in the last 24 hours.  No results found in the last 24 hours.    Assessment:      1. Aortic atherosclerosis    2. Pulmonary hypertension    3. Cerebrovascular accident (CVA) due to thrombosis of left vertebral artery    4. Essential hypertension    5. Hypercholesteremia    6. PVC (premature ventricular contraction)    7. PVD (peripheral vascular disease)    8. Nonrheumatic aortic valve stenosis        Plan:   Continue asa statin amlodipine losartan and hydralazine    Counseled DASH  Check Lipid profile with PCP in 6 months  Recommend heart-healthy diet, weight control and regular exercise.  Christi. Risk modification.   I have reviewed all pertinent labs and cardiac studies independently. Plans and recommendations have been formulated under my direct supervision. All questions answered and patient voiced understanding.   If symptoms persist go to the ED  RTC in 6 months           [1]   Social History  Tobacco Use    Smoking status: Former     Current packs/day: 0.00     Types: Cigarettes     Quit date: 2006     Years since quittin.6    Smokeless tobacco: Never   Substance Use Topics    Alcohol use: No    Drug use: No

## 2025-03-10 ENCOUNTER — LAB VISIT (OUTPATIENT)
Dept: LAB | Facility: HOSPITAL | Age: OVER 89
End: 2025-03-10
Attending: INTERNAL MEDICINE
Payer: MEDICARE

## 2025-03-10 ENCOUNTER — OFFICE VISIT (OUTPATIENT)
Dept: FAMILY MEDICINE | Facility: CLINIC | Age: OVER 89
End: 2025-03-10
Payer: MEDICARE

## 2025-03-10 VITALS
TEMPERATURE: 98 F | DIASTOLIC BLOOD PRESSURE: 72 MMHG | OXYGEN SATURATION: 97 % | SYSTOLIC BLOOD PRESSURE: 124 MMHG | HEART RATE: 74 BPM | WEIGHT: 208.31 LBS | BODY MASS INDEX: 26.73 KG/M2 | HEIGHT: 74 IN

## 2025-03-10 DIAGNOSIS — R26.9 ABNORMALITY OF GAIT AND MOBILITY: ICD-10-CM

## 2025-03-10 DIAGNOSIS — I63.012 CEREBROVASCULAR ACCIDENT (CVA) DUE TO THROMBOSIS OF LEFT VERTEBRAL ARTERY: ICD-10-CM

## 2025-03-10 DIAGNOSIS — I10 ESSENTIAL HYPERTENSION: Chronic | ICD-10-CM

## 2025-03-10 DIAGNOSIS — E78.00 HYPERCHOLESTEREMIA: Chronic | ICD-10-CM

## 2025-03-10 DIAGNOSIS — R53.81 DEBILITY: ICD-10-CM

## 2025-03-10 DIAGNOSIS — G31.84 MILD COGNITIVE IMPAIRMENT WITH MEMORY LOSS: Primary | ICD-10-CM

## 2025-03-10 DIAGNOSIS — I35.0 NONRHEUMATIC AORTIC VALVE STENOSIS: ICD-10-CM

## 2025-03-10 LAB
CHOLEST SERPL-MCNC: 123 MG/DL (ref 120–199)
CHOLEST/HDLC SERPL: 3.2 {RATIO} (ref 2–5)
HDLC SERPL-MCNC: 38 MG/DL (ref 40–75)
HDLC SERPL: 30.9 % (ref 20–50)
LDLC SERPL CALC-MCNC: 69.8 MG/DL (ref 63–159)
NONHDLC SERPL-MCNC: 85 MG/DL
TRIGL SERPL-MCNC: 76 MG/DL (ref 30–150)

## 2025-03-10 PROCEDURE — 80061 LIPID PANEL: CPT | Mod: HCNC | Performed by: INTERNAL MEDICINE

## 2025-03-10 PROCEDURE — 99999 PR PBB SHADOW E&M-EST. PATIENT-LVL IV: CPT | Mod: PBBFAC,HCNC,, | Performed by: INTERNAL MEDICINE

## 2025-03-10 PROCEDURE — 1101F PT FALLS ASSESS-DOCD LE1/YR: CPT | Mod: HCNC,CPTII,S$GLB, | Performed by: INTERNAL MEDICINE

## 2025-03-10 PROCEDURE — G2211 COMPLEX E/M VISIT ADD ON: HCPCS | Mod: HCNC,S$GLB,, | Performed by: INTERNAL MEDICINE

## 2025-03-10 PROCEDURE — 1126F AMNT PAIN NOTED NONE PRSNT: CPT | Mod: HCNC,CPTII,S$GLB, | Performed by: INTERNAL MEDICINE

## 2025-03-10 PROCEDURE — 36415 COLL VENOUS BLD VENIPUNCTURE: CPT | Mod: HCNC,PO | Performed by: INTERNAL MEDICINE

## 2025-03-10 PROCEDURE — 99214 OFFICE O/P EST MOD 30 MIN: CPT | Mod: HCNC,S$GLB,, | Performed by: INTERNAL MEDICINE

## 2025-03-10 PROCEDURE — 3288F FALL RISK ASSESSMENT DOCD: CPT | Mod: HCNC,CPTII,S$GLB, | Performed by: INTERNAL MEDICINE

## 2025-03-10 PROCEDURE — 1159F MED LIST DOCD IN RCRD: CPT | Mod: HCNC,CPTII,S$GLB, | Performed by: INTERNAL MEDICINE

## 2025-03-10 NOTE — PROGRESS NOTES
Subjective:       Patient ID: Chinmay Mei is a 89 y.o. male.    Chief Complaint: Follow-up (6 month), Hypertension, Hyperlipidemia, Cerebrovascular Accident, and Aortic Stenosis    Here with son-----    Follow-up  Associated symptoms include arthralgias, fatigue, myalgias and weakness. Pertinent negatives include no abdominal pain, chest pain, chills, coughing, diaphoresis, fever, headaches, joint swelling, nausea, neck pain, numbness, rash, sore throat or vomiting.   Hypertension  Associated symptoms include shortness of breath. Pertinent negatives include no chest pain, headaches, neck pain or palpitations.   Hyperlipidemia  Associated symptoms include myalgias and shortness of breath. Pertinent negatives include no chest pain.   Cerebrovascular Accident  Associated symptoms include arthralgias, fatigue, myalgias and weakness. Pertinent negatives include no abdominal pain, chest pain, chills, coughing, diaphoresis, fever, headaches, joint swelling, nausea, neck pain, numbness, rash, sore throat or vomiting.     Past Medical History:   Diagnosis Date    Acute ischemic stroke 7/27/2020    Arthritis     Dependence on other enabling machines and devices 11/19/2024    Hyperlipidemia     Hypertension     Open angle with borderline findings, low risk, bilateral 8/3/2018    PVD (peripheral vascular disease) 11/19/2024    Urinary incontinence 11/19/2024     Past Surgical History:   Procedure Laterality Date    CATARACT EXTRACTION W/  INTRAOCULAR LENS IMPLANT Left 02/22/2017    CATARACT EXTRACTION W/  INTRAOCULAR LENS IMPLANT Right 08/15/2018     Family History   Problem Relation Name Age of Onset    Heart disease Mother      Hypertension Mother      Prostate cancer Father      Diabetes Father      Diabetes Sister      Heart disease Sister      Glaucoma Sister      Hypertension Sister      Ulcers Sister      Heart disease Brother      Diabetes Brother      Hypertension Brother      Kidney disease Daughter           dialysis    Stomach cancer Daughter      Diabetes Son      Hypertension Son      Stroke Brother       Social History[1]  Review of Systems   Constitutional:  Positive for fatigue. Negative for activity change, appetite change, chills, diaphoresis, fever and unexpected weight change.   HENT:  Negative for drooling, ear discharge, ear pain, facial swelling, hearing loss, mouth sores, nosebleeds, postnasal drip, rhinorrhea, sinus pressure, sneezing, sore throat, tinnitus, trouble swallowing and voice change.    Eyes:  Negative for photophobia, redness and visual disturbance.   Respiratory:  Positive for shortness of breath. Negative for apnea, cough, choking, chest tightness and wheezing.    Cardiovascular:  Negative for chest pain, palpitations and leg swelling.   Gastrointestinal:  Negative for abdominal distention, abdominal pain, anal bleeding, blood in stool, constipation, diarrhea, nausea and vomiting.   Endocrine: Negative for cold intolerance, heat intolerance, polydipsia, polyphagia and polyuria.   Genitourinary:  Negative for difficulty urinating, dysuria, enuresis, flank pain, frequency, genital sores, hematuria and urgency.   Musculoskeletal:  Positive for arthralgias, gait problem and myalgias. Negative for back pain, joint swelling, neck pain and neck stiffness.   Skin:  Negative for color change, pallor, rash and wound.   Allergic/Immunologic: Negative for food allergies and immunocompromised state.   Neurological:  Positive for weakness. Negative for dizziness, tremors, seizures, syncope, facial asymmetry, speech difficulty, light-headedness, numbness and headaches.   Hematological:  Negative for adenopathy. Does not bruise/bleed easily.   Psychiatric/Behavioral:  Negative for agitation, behavioral problems, confusion, decreased concentration, dysphoric mood, hallucinations, self-injury, sleep disturbance and suicidal ideas. The patient is not nervous/anxious and is not hyperactive.        Objective:       Physical Exam  Vitals and nursing note reviewed.   Constitutional:       General: He is not in acute distress.     Appearance: Normal appearance. He is well-developed. He is not diaphoretic.   HENT:      Head: Normocephalic and atraumatic.      Mouth/Throat:      Pharynx: No oropharyngeal exudate.   Eyes:      General: No scleral icterus.     Pupils: Pupils are equal, round, and reactive to light.   Neck:      Thyroid: No thyromegaly.      Vascular: No carotid bruit or JVD.      Trachea: No tracheal deviation.   Cardiovascular:      Rate and Rhythm: Normal rate and regular rhythm.      Heart sounds: Normal heart sounds.   Pulmonary:      Effort: Pulmonary effort is normal. No respiratory distress.      Breath sounds: Normal breath sounds. No wheezing or rales.   Chest:      Chest wall: No tenderness.   Abdominal:      General: Bowel sounds are normal. There is no distension.      Palpations: Abdomen is soft.      Tenderness: There is no abdominal tenderness. There is no guarding or rebound.   Musculoskeletal:         General: No tenderness. Normal range of motion.      Cervical back: Normal range of motion and neck supple.   Lymphadenopathy:      Cervical: No cervical adenopathy.   Skin:     General: Skin is warm and dry.      Coloration: Skin is not pale.      Findings: No erythema or rash.   Neurological:      Mental Status: He is alert and oriented to person, place, and time.   Psychiatric:         Behavior: Behavior normal.         Thought Content: Thought content normal.         Judgment: Judgment normal.         CMP  Sodium   Date Value Ref Range Status   12/04/2024 142 136 - 145 mmol/L Final     Potassium   Date Value Ref Range Status   12/04/2024 4.3 3.5 - 5.1 mmol/L Final     Chloride   Date Value Ref Range Status   12/04/2024 104 95 - 110 mmol/L Final     CO2   Date Value Ref Range Status   12/04/2024 28 23 - 29 mmol/L Final     Glucose   Date Value Ref Range Status   12/04/2024 68 (L) 70 - 110 mg/dL  Final     BUN   Date Value Ref Range Status   12/04/2024 16 8 - 23 mg/dL Final     Creatinine   Date Value Ref Range Status   12/04/2024 0.8 0.5 - 1.4 mg/dL Final     Calcium   Date Value Ref Range Status   12/04/2024 9.7 8.7 - 10.5 mg/dL Final     Total Protein   Date Value Ref Range Status   09/09/2024 8.2 6.0 - 8.4 g/dL Final     Comment:     *Result may be interfered by visible hemolysis     Albumin   Date Value Ref Range Status   09/09/2024 4.1 3.5 - 5.2 g/dL Final     Total Bilirubin   Date Value Ref Range Status   09/09/2024 1.0 0.1 - 1.0 mg/dL Final     Comment:     For infants and newborns, interpretation of results should be based  on gestational age, weight and in agreement with clinical  observations.    Premature Infant recommended reference ranges:  Up to 24 hours.............<8.0 mg/dL  Up to 48 hours............<12.0 mg/dL  3-5 days..................<15.0 mg/dL  6-29 days.................<15.0 mg/dL       Alkaline Phosphatase   Date Value Ref Range Status   09/09/2024 96 55 - 135 U/L Final     AST   Date Value Ref Range Status   09/09/2024 22 10 - 40 U/L Final     Comment:     *Result may be interfered by visible hemolysis     ALT   Date Value Ref Range Status   09/09/2024 10 10 - 44 U/L Final     Anion Gap   Date Value Ref Range Status   12/04/2024 10 8 - 16 mmol/L Final     eGFR if    Date Value Ref Range Status   11/04/2021 >60.0 >60 mL/min/1.73 m^2 Final     eGFR if non    Date Value Ref Range Status   11/04/2021 >60.0 >60 mL/min/1.73 m^2 Final     Comment:     Calculation used to obtain the estimated glomerular filtration  rate (eGFR) is the CKD-EPI equation.        Lab Results   Component Value Date    WBC 5.69 09/09/2024    HGB 13.8 (L) 09/09/2024    HCT 43.9 09/09/2024    MCV 87 09/09/2024     09/09/2024     Lab Results   Component Value Date    CHOL 207 (H) 03/08/2024     Lab Results   Component Value Date    HDL 40 03/08/2024     Lab Results    Component Value Date    LDLCALC 144.6 2024     Lab Results   Component Value Date    TRIG 112 2024     Lab Results   Component Value Date    CHOLHDL 19.3 (L) 2024     Lab Results   Component Value Date    TSH 0.454 2020     Lab Results   Component Value Date    HGBA1C 5.5 2020     Assessment:       1. Mild cognitive impairment with memory loss    2. Hypercholesteremia    3. Essential hypertension    4. Debility    5. Cerebrovascular accident (CVA) due to thrombosis of left vertebral artery    6. Nonrheumatic aortic valve stenosis    7. Abnormality of gait and mobility        Plan:   Mild cognitive impairment with memory loss    Hypercholesteremia  -     Lipid Panel; Future; Expected date: 03/10/2025    Essential hypertension    Debility    Cerebrovascular accident (CVA) due to thrombosis of left vertebral artery    Nonrheumatic aortic valve stenosis    Abnormality of gait and mobility    Stable-----------------continue meds--------------as above--------------declines urology f/u-------------------------f/u 6 months---------------         [1]   Social History  Socioeconomic History    Marital status:    Tobacco Use    Smoking status: Former     Current packs/day: 0.00     Types: Cigarettes     Quit date: 2006     Years since quittin.6    Smokeless tobacco: Never   Substance and Sexual Activity    Alcohol use: No    Drug use: No     Social Drivers of Health     Financial Resource Strain: Low Risk  (3/10/2025)    Overall Financial Resource Strain (CARDIA)     Difficulty of Paying Living Expenses: Not very hard   Recent Concern: Financial Resource Strain - Medium Risk (2024)    Overall Financial Resource Strain (CARDIA)     Difficulty of Paying Living Expenses: Somewhat hard   Food Insecurity: No Food Insecurity (3/10/2025)    Hunger Vital Sign     Worried About Running Out of Food in the Last Year: Never true     Ran Out of Food in the Last Year: Never true    Recent Concern: Food Insecurity - Food Insecurity Present (12/11/2024)    Hunger Vital Sign     Worried About Running Out of Food in the Last Year: Sometimes true     Ran Out of Food in the Last Year: Never true   Transportation Needs: No Transportation Needs (3/10/2025)    PRAPARE - Transportation     Lack of Transportation (Medical): No     Lack of Transportation (Non-Medical): No   Physical Activity: Inactive (3/10/2025)    Exercise Vital Sign     Days of Exercise per Week: 0 days     Minutes of Exercise per Session: 0 min   Stress: No Stress Concern Present (3/10/2025)    [employer] - Occupational Stress Questionnaire     Feeling of Stress : Not at all   Housing Stability: Low Risk  (3/10/2025)    Housing Stability Vital Sign     Unable to Pay for Housing in the Last Year: No     Number of Times Moved in the Last Year: 0     Homeless in the Last Year: No

## 2025-03-11 ENCOUNTER — PATIENT MESSAGE (OUTPATIENT)
Dept: FAMILY MEDICINE | Facility: CLINIC | Age: OVER 89
End: 2025-03-11
Payer: MEDICARE

## 2025-04-11 ENCOUNTER — OFFICE VISIT (OUTPATIENT)
Dept: OPHTHALMOLOGY | Facility: CLINIC | Age: OVER 89
End: 2025-04-11
Payer: MEDICARE

## 2025-04-11 DIAGNOSIS — H01.02A SQUAMOUS BLEPHARITIS OF UPPER AND LOWER EYELIDS OF BOTH EYES: ICD-10-CM

## 2025-04-11 DIAGNOSIS — Z96.1 PSEUDOPHAKIA OF BOTH EYES: ICD-10-CM

## 2025-04-11 DIAGNOSIS — H01.02B SQUAMOUS BLEPHARITIS OF UPPER AND LOWER EYELIDS OF BOTH EYES: ICD-10-CM

## 2025-04-11 DIAGNOSIS — H02.105 ECTROPION OF BOTH LOWER EYELIDS, UNSPECIFIED ECTROPION TYPE: Primary | ICD-10-CM

## 2025-04-11 DIAGNOSIS — H26.491 RIGHT POSTERIOR CAPSULAR OPACIFICATION: ICD-10-CM

## 2025-04-11 DIAGNOSIS — H02.102 ECTROPION OF BOTH LOWER EYELIDS, UNSPECIFIED ECTROPION TYPE: Primary | ICD-10-CM

## 2025-04-11 PROCEDURE — 99999 PR PBB SHADOW E&M-EST. PATIENT-LVL III: CPT | Mod: PBBFAC,HCNC,, | Performed by: OPTOMETRIST

## 2025-04-11 RX ORDER — MOXIFLOXACIN 5 MG/ML
1 SOLUTION/ DROPS OPHTHALMIC 4 TIMES DAILY
Qty: 3 ML | Refills: 0 | Status: SHIPPED | OUTPATIENT
Start: 2025-04-11 | End: 2025-04-11

## 2025-04-11 RX ORDER — NEOMYCIN SULFATE, POLYMYXIN B SULFATE, AND DEXAMETHASONE 3.5; 10000; 1 MG/G; [USP'U]/G; MG/G
OINTMENT OPHTHALMIC
Qty: 3.5 G | Refills: 0 | Status: SHIPPED | OUTPATIENT
Start: 2025-04-11 | End: 2025-04-11 | Stop reason: ALTCHOICE

## 2025-04-11 RX ORDER — MOXIFLOXACIN 5 MG/ML
1 SOLUTION/ DROPS OPHTHALMIC 4 TIMES DAILY
Qty: 3 ML | Refills: 0 | Status: SHIPPED | OUTPATIENT
Start: 2025-04-11 | End: 2025-04-21

## 2025-04-11 NOTE — PROGRESS NOTES
HPI     Eye Problem            Comments: Patient states OD vision is foggy and can't hardly see  OS is red and little irritated today           Last edited by Ariela Grant on 4/11/2025 12:53 PM.            Assessment /Plan     For exam results, see Encounter Report.    1. Ectropion of both lower eyelids, unspecified ectropion type  2. Squamous blepharitis of upper and lower eyelids of both eyes  -     moxifloxacin (VIGAMOX) 0.5 % ophthalmic solution; Place 1 drop into the left eye 4 (four) times daily. for 10 days  Dispense: 3 mL; Refill: 0  Warm compress/lid hygiene reviewed. Start Ivizia lid scrubs BID.   Start moxi QID x 10 days    3. Right posterior capsular opacification  -     Ambulatory referral/consult to Ophthalmology; Future; Expected date: 04/18/2025  Refer to Dr Gastelum for Yag dennis.     4. Pseudophakia of both eyes  Observe     RTC with Dr Gastelum for Yag eval.

## 2025-04-14 ENCOUNTER — TELEPHONE (OUTPATIENT)
Dept: OPHTHALMOLOGY | Facility: CLINIC | Age: OVER 89
End: 2025-04-14
Payer: MEDICARE

## 2025-04-14 NOTE — TELEPHONE ENCOUNTER
----- Message from Satish Whaley OD sent at 4/11/2025  1:30 PM CDT -----  Please call to schedule patient for JAY collins, referral in epic.    Thanks,  DT

## 2025-04-16 ENCOUNTER — TELEPHONE (OUTPATIENT)
Dept: OPHTHALMOLOGY | Facility: CLINIC | Age: OVER 89
End: 2025-04-16
Payer: MEDICARE

## 2025-04-16 NOTE — TELEPHONE ENCOUNTER
----- Message from Mirella sent at 4/16/2025 12:29 PM CDT -----  In encounter it states patient should be schedule May 1st  @1030 please advise  Mayito

## 2025-04-22 ENCOUNTER — TELEPHONE (OUTPATIENT)
Dept: OPHTHALMOLOGY | Facility: CLINIC | Age: OVER 89
End: 2025-04-22
Payer: MEDICARE

## 2025-04-22 NOTE — TELEPHONE ENCOUNTER
Pt's son called to let us know that he was told pt had appt on 5/1, but appt was not scheduled. Told pt's son that we are unfortunately unable to schedule him for that day, but I could get him in for 5/15 at 10:30. Pt's son acknowledged that works better.    ----- Message from Mirella sent at 4/22/2025 12:40 PM CDT -----  Pt states he was told by nurse he had an appt schedule for May 1st 1030 Appt is not schedule please advise [phone]

## 2025-04-28 RX ORDER — ATORVASTATIN CALCIUM 40 MG/1
40 TABLET, FILM COATED ORAL NIGHTLY
Qty: 90 TABLET | Refills: 1 | Status: SHIPPED | OUTPATIENT
Start: 2025-04-28 | End: 2025-10-25

## 2025-04-28 NOTE — TELEPHONE ENCOUNTER
No care due was identified.  [facility] Embedded Care Due Messages. Reference number: [id number].   4/28/2025 12:08:54 AM CDT

## 2025-04-28 NOTE — TELEPHONE ENCOUNTER
Chinmay Mei  is requesting a refill authorization.  Brief Assessment and Rationale for Refill:  Approve     Medication Therapy Plan:         Comments:     Note composed:8:58 AM 04/28/2025

## 2025-05-15 ENCOUNTER — OFFICE VISIT (OUTPATIENT)
Dept: OPHTHALMOLOGY | Facility: CLINIC | Age: OVER 89
End: 2025-05-15
Payer: MEDICARE

## 2025-05-15 DIAGNOSIS — H02.102 ECTROPION OF BOTH LOWER EYELIDS, UNSPECIFIED ECTROPION TYPE: ICD-10-CM

## 2025-05-15 DIAGNOSIS — H02.105 ECTROPION OF BOTH LOWER EYELIDS, UNSPECIFIED ECTROPION TYPE: ICD-10-CM

## 2025-05-15 DIAGNOSIS — H26.491 RIGHT POSTERIOR CAPSULAR OPACIFICATION: Primary | ICD-10-CM

## 2025-05-15 PROCEDURE — 99999 PR PBB SHADOW E&M-EST. PATIENT-LVL III: CPT | Mod: PBBFAC,HCNC,, | Performed by: OPHTHALMOLOGY

## 2025-05-15 RX ORDER — PREDNISOLONE ACETATE 10 MG/ML
1 SUSPENSION/ DROPS OPHTHALMIC 4 TIMES DAILY
Qty: 5 ML | Refills: 1 | Status: SHIPPED | OUTPATIENT
Start: 2025-05-15 | End: 2025-05-15

## 2025-05-15 RX ORDER — PREDNISOLONE ACETATE 10 MG/ML
1 SUSPENSION/ DROPS OPHTHALMIC 4 TIMES DAILY
Qty: 5 ML | Refills: 1 | Status: SHIPPED | OUTPATIENT
Start: 2025-05-15 | End: 2025-05-15 | Stop reason: SDUPTHER

## 2025-05-15 NOTE — PROGRESS NOTES
HPI     Blurred Vision     Additional comments: Here to evaluate for possible yag laser OD. Pt still   has discharge OS, thinks he still has infection.  Used all of the vigamox   drops.  Hasn't done lid hygiene.  Feels like he has sand in OS.    Referred by Dr. Whaley.           Comments    1.PC IOL OD +21.5 SN60WF (distance) 8/16/18  PCIOL OS +21.0 SN60WF 2-22-17 (Distance)  2. COAG Suspect (+FHX-son Chinmay is my pt)  3. PCO OD   4. Ectropian BLL            Last edited by Hilary Turner MA on 5/15/2025 11:00 AM.            Assessment /Plan     For exam results, see Encounter Report.    Right posterior capsular opacification  PROCEDURE NOTE    89 y.o. year old patient experiencing a symptomatic decrease in vision OD with inability to perform activities of daily living including reading.      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 Including risks of retinal detachment (1-3%), macular edema, dislocated implant, pain, inflammation elevated intraocular pressure and vision loss. Consent signed.  Time out procedure form completed prior to laser.    Medications given:  Tetracaine    Laser energy settings:    1.4 energy per shot  72 bursts  1 to 1 ratio per shot     Central capsular opening created without difficulty.    RTC 2 weeks for Mrx with Dr. Whaley      Ectropion of both lower eyelids, unspecified ectropion type  Discussed with patient and family that definitive treatment for this condition is surgical correction. Can start Genteal gel TID OU as conservative treatment for now.

## 2025-06-06 ENCOUNTER — PATIENT MESSAGE (OUTPATIENT)
Dept: OPHTHALMOLOGY | Facility: CLINIC | Age: OVER 89
End: 2025-06-06
Payer: MEDICARE

## 2025-07-09 DIAGNOSIS — I10 ESSENTIAL HYPERTENSION: Primary | Chronic | ICD-10-CM

## 2025-07-09 RX ORDER — AMLODIPINE BESYLATE 10 MG/1
10 TABLET ORAL
Qty: 90 TABLET | Refills: 3 | Status: SHIPPED | OUTPATIENT
Start: 2025-07-09

## 2025-08-21 ENCOUNTER — OFFICE VISIT (OUTPATIENT)
Dept: CARDIOLOGY | Facility: CLINIC | Age: OVER 89
End: 2025-08-21
Payer: MEDICARE

## 2025-08-21 VITALS
DIASTOLIC BLOOD PRESSURE: 94 MMHG | BODY MASS INDEX: 26.73 KG/M2 | WEIGHT: 208.31 LBS | OXYGEN SATURATION: 100 % | SYSTOLIC BLOOD PRESSURE: 158 MMHG | HEART RATE: 62 BPM | HEIGHT: 74 IN

## 2025-08-21 DIAGNOSIS — E78.00 HYPERCHOLESTEREMIA: Chronic | ICD-10-CM

## 2025-08-21 DIAGNOSIS — I27.20 PULMONARY HYPERTENSION: ICD-10-CM

## 2025-08-21 DIAGNOSIS — I49.3 PVC (PREMATURE VENTRICULAR CONTRACTION): ICD-10-CM

## 2025-08-21 DIAGNOSIS — I70.0 AORTIC ATHEROSCLEROSIS: ICD-10-CM

## 2025-08-21 DIAGNOSIS — I10 ESSENTIAL HYPERTENSION: Chronic | ICD-10-CM

## 2025-08-21 DIAGNOSIS — I73.9 PVD (PERIPHERAL VASCULAR DISEASE): ICD-10-CM

## 2025-08-21 DIAGNOSIS — I35.0 NONRHEUMATIC AORTIC VALVE STENOSIS: Primary | ICD-10-CM

## 2025-08-21 PROCEDURE — 99999 PR PBB SHADOW E&M-EST. PATIENT-LVL IV: CPT | Mod: PBBFAC,HCNC,, | Performed by: INTERNAL MEDICINE
